# Patient Record
Sex: FEMALE | Race: WHITE | Employment: UNEMPLOYED | URBAN - METROPOLITAN AREA
[De-identification: names, ages, dates, MRNs, and addresses within clinical notes are randomized per-mention and may not be internally consistent; named-entity substitution may affect disease eponyms.]

---

## 2018-05-29 RX ORDER — CIPROFLOXACIN 250 MG/1
250 TABLET, FILM COATED ORAL 2 TIMES DAILY
Qty: 14 TABLET | Refills: 0 | Status: SHIPPED | OUTPATIENT
Start: 2018-05-29 | End: 2018-06-05

## 2018-10-17 ENCOUNTER — APPOINTMENT (OUTPATIENT)
Dept: GENERAL RADIOLOGY | Age: 83
DRG: 563 | End: 2018-10-17
Payer: COMMERCIAL

## 2018-10-17 ENCOUNTER — HOSPITAL ENCOUNTER (INPATIENT)
Age: 83
LOS: 5 days | Discharge: NURSING, REHAB OR LONG-TERM CARE | DRG: 563 | End: 2018-10-22
Attending: EMERGENCY MEDICINE | Admitting: INTERNAL MEDICINE
Payer: COMMERCIAL

## 2018-10-17 DIAGNOSIS — N39.0 URINARY TRACT INFECTION WITHOUT HEMATURIA, SITE UNSPECIFIED: ICD-10-CM

## 2018-10-17 DIAGNOSIS — S82.892A CLOSED FRACTURE OF LEFT ANKLE, INITIAL ENCOUNTER: Primary | ICD-10-CM

## 2018-10-17 LAB
ANION GAP SERPL CALCULATED.3IONS-SCNC: 13 MMOL/L (ref 3–16)
APTT: 32.9 SEC (ref 26–36)
BACTERIA: ABNORMAL /HPF
BILIRUBIN URINE: NEGATIVE
BLOOD, URINE: ABNORMAL
BUN BLDV-MCNC: 25 MG/DL (ref 7–20)
CALCIUM SERPL-MCNC: 9.1 MG/DL (ref 8.3–10.6)
CHLORIDE BLD-SCNC: 105 MMOL/L (ref 99–110)
CLARITY: ABNORMAL
CO2: 22 MMOL/L (ref 21–32)
COLOR: YELLOW
CREAT SERPL-MCNC: 0.6 MG/DL (ref 0.6–1.2)
EPITHELIAL CELLS, UA: 2 /HPF (ref 0–5)
GFR AFRICAN AMERICAN: >60
GFR NON-AFRICAN AMERICAN: >60
GLUCOSE BLD-MCNC: 122 MG/DL (ref 70–99)
GLUCOSE URINE: NEGATIVE MG/DL
HCT VFR BLD CALC: 39.7 % (ref 36–48)
HEMOGLOBIN: 12.7 G/DL (ref 12–16)
HYALINE CASTS: 5 /LPF (ref 0–8)
INR BLD: 1.18 (ref 0.86–1.14)
KETONES, URINE: NEGATIVE MG/DL
LEUKOCYTE ESTERASE, URINE: ABNORMAL
MCH RBC QN AUTO: 28 PG (ref 26–34)
MCHC RBC AUTO-ENTMCNC: 32 G/DL (ref 31–36)
MCV RBC AUTO: 87.4 FL (ref 80–100)
MICROSCOPIC EXAMINATION: YES
NITRITE, URINE: NEGATIVE
PDW BLD-RTO: 15.4 % (ref 12.4–15.4)
PH UA: 7
PLATELET # BLD: 185 K/UL (ref 135–450)
PMV BLD AUTO: 8 FL (ref 5–10.5)
POTASSIUM SERPL-SCNC: 3.6 MMOL/L (ref 3.5–5.1)
PROTEIN UA: NEGATIVE MG/DL
PROTHROMBIN TIME: 13.4 SEC (ref 9.8–13)
RBC # BLD: 4.55 M/UL (ref 4–5.2)
RBC UA: 3 /HPF (ref 0–4)
SODIUM BLD-SCNC: 140 MMOL/L (ref 136–145)
SPECIFIC GRAVITY UA: 1.01
URINE REFLEX TO CULTURE: YES
URINE TYPE: ABNORMAL
UROBILINOGEN, URINE: 0.2 E.U./DL
WBC # BLD: 7 K/UL (ref 4–11)
WBC UA: 70 /HPF (ref 0–5)

## 2018-10-17 PROCEDURE — 73552 X-RAY EXAM OF FEMUR 2/>: CPT

## 2018-10-17 PROCEDURE — 6360000002 HC RX W HCPCS: Performed by: INTERNAL MEDICINE

## 2018-10-17 PROCEDURE — 87186 SC STD MICRODIL/AGAR DIL: CPT

## 2018-10-17 PROCEDURE — 80048 BASIC METABOLIC PNL TOTAL CA: CPT

## 2018-10-17 PROCEDURE — 85027 COMPLETE CBC AUTOMATED: CPT

## 2018-10-17 PROCEDURE — 81001 URINALYSIS AUTO W/SCOPE: CPT

## 2018-10-17 PROCEDURE — 94761 N-INVAS EAR/PLS OXIMETRY MLT: CPT

## 2018-10-17 PROCEDURE — 6370000000 HC RX 637 (ALT 250 FOR IP): Performed by: INTERNAL MEDICINE

## 2018-10-17 PROCEDURE — 87086 URINE CULTURE/COLONY COUNT: CPT

## 2018-10-17 PROCEDURE — 2580000003 HC RX 258: Performed by: INTERNAL MEDICINE

## 2018-10-17 PROCEDURE — 87040 BLOOD CULTURE FOR BACTERIA: CPT

## 2018-10-17 PROCEDURE — 96365 THER/PROPH/DIAG IV INF INIT: CPT

## 2018-10-17 PROCEDURE — 6360000002 HC RX W HCPCS: Performed by: EMERGENCY MEDICINE

## 2018-10-17 PROCEDURE — 27808 TREATMENT OF ANKLE FRACTURE: CPT | Performed by: ORTHOPAEDIC SURGERY

## 2018-10-17 PROCEDURE — 99222 1ST HOSP IP/OBS MODERATE 55: CPT | Performed by: ORTHOPAEDIC SURGERY

## 2018-10-17 PROCEDURE — 1200000000 HC SEMI PRIVATE

## 2018-10-17 PROCEDURE — 87077 CULTURE AEROBIC IDENTIFY: CPT

## 2018-10-17 PROCEDURE — 4500000025 HC ED LEVEL 5 PROCEDURE

## 2018-10-17 PROCEDURE — 2580000003 HC RX 258: Performed by: NURSE PRACTITIONER

## 2018-10-17 PROCEDURE — 71045 X-RAY EXAM CHEST 1 VIEW: CPT

## 2018-10-17 PROCEDURE — 2700000000 HC OXYGEN THERAPY PER DAY

## 2018-10-17 PROCEDURE — 96372 THER/PROPH/DIAG INJ SC/IM: CPT

## 2018-10-17 PROCEDURE — 99285 EMERGENCY DEPT VISIT HI MDM: CPT

## 2018-10-17 PROCEDURE — 2580000003 HC RX 258: Performed by: EMERGENCY MEDICINE

## 2018-10-17 PROCEDURE — 73521 X-RAY EXAM HIPS BI 2 VIEWS: CPT

## 2018-10-17 PROCEDURE — 85610 PROTHROMBIN TIME: CPT

## 2018-10-17 PROCEDURE — 85730 THROMBOPLASTIN TIME PARTIAL: CPT

## 2018-10-17 PROCEDURE — 73590 X-RAY EXAM OF LOWER LEG: CPT

## 2018-10-17 PROCEDURE — 73600 X-RAY EXAM OF ANKLE: CPT

## 2018-10-17 RX ORDER — DORZOLAMIDE HYDROCHLORIDE AND TIMOLOL MALEATE 20; 5 MG/ML; MG/ML
1 SOLUTION/ DROPS OPHTHALMIC 2 TIMES DAILY
Status: DISCONTINUED | OUTPATIENT
Start: 2018-10-17 | End: 2018-10-22 | Stop reason: HOSPADM

## 2018-10-17 RX ORDER — SODIUM CHLORIDE 0.9 % (FLUSH) 0.9 %
10 SYRINGE (ML) INJECTION EVERY 12 HOURS SCHEDULED
Status: DISCONTINUED | OUTPATIENT
Start: 2018-10-17 | End: 2018-10-22 | Stop reason: HOSPADM

## 2018-10-17 RX ORDER — SOLIFENACIN SUCCINATE 10 MG/1
5 TABLET, FILM COATED ORAL DAILY
Status: DISCONTINUED | OUTPATIENT
Start: 2018-10-17 | End: 2018-10-17 | Stop reason: CLARIF

## 2018-10-17 RX ORDER — SOLIFENACIN SUCCINATE 5 MG/1
5 TABLET, FILM COATED ORAL DAILY
Status: ON HOLD | COMMUNITY
End: 2020-08-14 | Stop reason: HOSPADM

## 2018-10-17 RX ORDER — HYDRALAZINE HYDROCHLORIDE 10 MG/1
10 TABLET, FILM COATED ORAL 2 TIMES DAILY PRN
Status: ON HOLD | COMMUNITY
End: 2020-08-14 | Stop reason: HOSPADM

## 2018-10-17 RX ORDER — PROMETHAZINE HYDROCHLORIDE 25 MG/1
25 SUPPOSITORY RECTAL EVERY 6 HOURS PRN
Status: ON HOLD | COMMUNITY
End: 2018-10-19 | Stop reason: HOSPADM

## 2018-10-17 RX ORDER — LATANOPROST 50 UG/ML
1 SOLUTION/ DROPS OPHTHALMIC NIGHTLY
Status: ON HOLD | COMMUNITY
End: 2020-08-14 | Stop reason: HOSPADM

## 2018-10-17 RX ORDER — CLOTRIMAZOLE 1 %
CREAM (GRAM) TOPICAL 2 TIMES DAILY
Status: DISCONTINUED | OUTPATIENT
Start: 2018-10-17 | End: 2018-10-22 | Stop reason: HOSPADM

## 2018-10-17 RX ORDER — LISINOPRIL 5 MG/1
2.5 TABLET ORAL DAILY
Status: DISCONTINUED | OUTPATIENT
Start: 2018-10-17 | End: 2018-10-22 | Stop reason: HOSPADM

## 2018-10-17 RX ORDER — LEVOTHYROXINE SODIUM 0.12 MG/1
125 TABLET ORAL DAILY
Status: DISCONTINUED | OUTPATIENT
Start: 2018-10-17 | End: 2018-10-22 | Stop reason: HOSPADM

## 2018-10-17 RX ORDER — POTASSIUM CHLORIDE 1500 MG/1
20 TABLET, FILM COATED, EXTENDED RELEASE ORAL DAILY
Status: ON HOLD | COMMUNITY
End: 2018-10-19 | Stop reason: HOSPADM

## 2018-10-17 RX ORDER — POLYETHYLENE GLYCOL 3350 17 G/17G
17 POWDER, FOR SOLUTION ORAL DAILY PRN
Status: DISCONTINUED | OUTPATIENT
Start: 2018-10-17 | End: 2018-10-22

## 2018-10-17 RX ORDER — ONDANSETRON 4 MG/1
4 TABLET, FILM COATED ORAL EVERY 8 HOURS PRN
Status: ON HOLD | COMMUNITY
End: 2018-10-19 | Stop reason: HOSPADM

## 2018-10-17 RX ORDER — ONDANSETRON 2 MG/ML
4 INJECTION INTRAMUSCULAR; INTRAVENOUS EVERY 6 HOURS PRN
Status: DISCONTINUED | OUTPATIENT
Start: 2018-10-17 | End: 2018-10-22 | Stop reason: HOSPADM

## 2018-10-17 RX ORDER — LOPERAMIDE HYDROCHLORIDE 2 MG/1
2 CAPSULE ORAL 4 TIMES DAILY PRN
Status: ON HOLD | COMMUNITY
End: 2018-10-19 | Stop reason: HOSPADM

## 2018-10-17 RX ORDER — ACETAMINOPHEN 325 MG/1
650 TABLET ORAL EVERY 8 HOURS PRN
Status: DISCONTINUED | OUTPATIENT
Start: 2018-10-17 | End: 2018-10-22 | Stop reason: HOSPADM

## 2018-10-17 RX ORDER — CALCIUM CARBONATE 500(1250)
500 TABLET ORAL
Status: DISCONTINUED | OUTPATIENT
Start: 2018-10-17 | End: 2018-10-22 | Stop reason: HOSPADM

## 2018-10-17 RX ORDER — TROSPIUM CHLORIDE 20 MG/1
20 TABLET, FILM COATED ORAL NIGHTLY
Status: DISCONTINUED | OUTPATIENT
Start: 2018-10-17 | End: 2018-10-22 | Stop reason: HOSPADM

## 2018-10-17 RX ORDER — ASPIRIN 325 MG
325 TABLET, DELAYED RELEASE (ENTERIC COATED) ORAL DAILY
Qty: 30 TABLET | Refills: 0 | Status: ON HOLD | OUTPATIENT
Start: 2018-10-17 | End: 2020-08-14 | Stop reason: HOSPADM

## 2018-10-17 RX ORDER — SODIUM CHLORIDE 0.9 % (FLUSH) 0.9 %
10 SYRINGE (ML) INJECTION PRN
Status: DISCONTINUED | OUTPATIENT
Start: 2018-10-17 | End: 2018-10-22 | Stop reason: HOSPADM

## 2018-10-17 RX ORDER — TRIAMCINOLONE ACETONIDE 1 MG/G
CREAM TOPICAL 2 TIMES DAILY
Status: DISCONTINUED | OUTPATIENT
Start: 2018-10-17 | End: 2018-10-22 | Stop reason: HOSPADM

## 2018-10-17 RX ORDER — AMOXICILLIN 250 MG
1 CAPSULE ORAL 2 TIMES DAILY PRN
Status: ON HOLD | COMMUNITY
End: 2020-08-14 | Stop reason: HOSPADM

## 2018-10-17 RX ORDER — LATANOPROST 50 UG/ML
1 SOLUTION/ DROPS OPHTHALMIC NIGHTLY
Status: DISCONTINUED | OUTPATIENT
Start: 2018-10-17 | End: 2018-10-22 | Stop reason: HOSPADM

## 2018-10-17 RX ORDER — PANTOPRAZOLE SODIUM 40 MG/1
40 TABLET, DELAYED RELEASE ORAL
Status: DISCONTINUED | OUTPATIENT
Start: 2018-10-17 | End: 2018-10-22 | Stop reason: HOSPADM

## 2018-10-17 RX ORDER — LORATADINE 10 MG/1
10 TABLET ORAL DAILY
Status: ON HOLD | COMMUNITY
End: 2020-08-14 | Stop reason: HOSPADM

## 2018-10-17 RX ORDER — LISINOPRIL 2.5 MG/1
2.5 TABLET ORAL DAILY
Status: ON HOLD | COMMUNITY
End: 2020-08-14 | Stop reason: HOSPADM

## 2018-10-17 RX ORDER — POLYETHYLENE GLYCOL 3350 17 G/17G
17 POWDER, FOR SOLUTION ORAL 2 TIMES DAILY PRN
Status: ON HOLD | COMMUNITY
End: 2020-08-14 | Stop reason: HOSPADM

## 2018-10-17 RX ORDER — CALCIUM CARBONATE 500(1250)
500 TABLET ORAL 2 TIMES DAILY
Status: ON HOLD | COMMUNITY
End: 2020-08-14 | Stop reason: HOSPADM

## 2018-10-17 RX ORDER — SODIUM CHLORIDE 9 MG/ML
INJECTION, SOLUTION INTRAVENOUS CONTINUOUS
Status: DISCONTINUED | OUTPATIENT
Start: 2018-10-17 | End: 2018-10-22

## 2018-10-17 RX ADMIN — CLOTRIMAZOLE: 10 CREAM TOPICAL at 21:15

## 2018-10-17 RX ADMIN — CALCIUM 500 MG: 500 TABLET ORAL at 10:41

## 2018-10-17 RX ADMIN — TROSPIUM CHLORIDE 20 MG: 20 TABLET ORAL at 22:48

## 2018-10-17 RX ADMIN — DORZOLAMIDE HYDROCHLORIDE AND TIMOLOL MALEATE 1 DROP: 20; 5 SOLUTION/ DROPS OPHTHALMIC at 22:49

## 2018-10-17 RX ADMIN — TRIAMCINOLONE ACETONIDE: 1 CREAM TOPICAL at 21:15

## 2018-10-17 RX ADMIN — PANTOPRAZOLE SODIUM 40 MG: 40 TABLET, DELAYED RELEASE ORAL at 10:41

## 2018-10-17 RX ADMIN — Medication 10 ML: at 21:13

## 2018-10-17 RX ADMIN — ACETAMINOPHEN 650 MG: 325 TABLET, FILM COATED ORAL at 15:15

## 2018-10-17 RX ADMIN — LATANOPROST 1 DROP: 50 SOLUTION OPHTHALMIC at 22:48

## 2018-10-17 RX ADMIN — ENOXAPARIN SODIUM 40 MG: 40 INJECTION SUBCUTANEOUS at 10:41

## 2018-10-17 RX ADMIN — SODIUM CHLORIDE: 9 INJECTION, SOLUTION INTRAVENOUS at 21:11

## 2018-10-17 RX ADMIN — Medication 10 ML: at 10:41

## 2018-10-17 RX ADMIN — METOPROLOL TARTRATE 25 MG: 25 TABLET ORAL at 10:39

## 2018-10-17 RX ADMIN — LEVOTHYROXINE SODIUM 125 MCG: 125 TABLET ORAL at 10:41

## 2018-10-17 RX ADMIN — VITAMIN D, TAB 1000IU (100/BT) 2000 UNITS: 25 TAB at 10:39

## 2018-10-17 RX ADMIN — LISINOPRIL 2.5 MG: 5 TABLET ORAL at 10:41

## 2018-10-17 RX ADMIN — CEFTRIAXONE 1 G: 1 INJECTION, POWDER, FOR SOLUTION INTRAMUSCULAR; INTRAVENOUS at 06:45

## 2018-10-17 NOTE — CONSULTS
[facility] Orthopedic Surgery  Consult Note        This patient is seen in consultation at the request of Dr Mike Schuler MD    Reason for Consult:  Left ankle pain / medial & lateral bimalleolar fracture with proximal fibula fracture. CHIEF COMPLAINT:  Left ankle pain / fall. History Obtained From:  patient, family member - son, electronic medical record    HISTORY OF PRESENT ILLNESS:    Ms. Randy Pichardo 80 y.o.  female presents today to [facility] ED for evaluation of a left ankle injury which occurred when she was walking and tripped. She was first seen and evaluated in ED, where she was x-rayed, and I was consulted. She is complaining of medial & lateral ankle pain and swelling. This is better with elevation and worse with bearing any wt. The pain is sharp and not radiating. No numbness or tingling sensation. Alleviating factors: elevation and rest. She also has UTI and will be admitted for that. No other complaint.      Past Medical History:        Diagnosis Date    Carcinoma in situ of colon 1998    Diarrhea     Esophageal reflux     Other extrapyramidal disease and abnormal movement disorder     Pernicious anemia     S/P colonoscopy 4/09    OK - no further eval.    Unspecified cataract     Unspecified hypothyroidism     Unspecified pruritic disorder        Past Surgical History:        Procedure Laterality Date    COLECTOMY      partial    COLONOSCOPY  4/09    OK - no further eval    HYSTERECTOMY         Current Medications:   Current Facility-Administered Medications: calcium elemental (OSCAL) tablet 500 mg, 500 mg, Oral, Daily with breakfast  dorzolamide-timolol (COSOPT) 22.3-6.8 MG/ML ophthalmic solution 1 drop, 1 drop, Both Eyes, BID  latanoprost (XALATAN) 0.005 % ophthalmic solution 1 drop, 1 drop, Both Eyes, Nightly  levothyroxine (SYNTHROID) tablet 125 mcg, 125 mcg, Oral, Daily  lisinopril (PRINIVIL;ZESTRIL) tablet 2.5 mg, 2.5 mg, Oral, Daily  metoprolol tartrate (LOPRESSOR) tablet
Daily  metoprolol tartrate (LOPRESSOR) tablet 25 mg, 25 mg, Oral, BID  vitamin D (CHOLECALCIFEROL) tablet 2,000 Units, 2,000 Units, Oral, Daily  [START ON 10/18/2018] cefTRIAXone (ROCEPHIN) 1 g IVPB in 50 mL D5W minibag, 1 g, Intravenous, Q24H  sodium chloride flush 0.9 % injection 10 mL, 10 mL, Intravenous, 2 times per day  sodium chloride flush 0.9 % injection 10 mL, 10 mL, Intravenous, PRN  magnesium hydroxide (MILK OF MAGNESIA) 400 MG/5ML suspension 30 mL, 30 mL, Oral, Daily PRN  ondansetron (ZOFRAN) injection 4 mg, 4 mg, Intravenous, Q6H PRN  enoxaparin (LOVENOX) injection 40 mg, 40 mg, Subcutaneous, Daily  pantoprazole (PROTONIX) tablet 40 mg, 40 mg, Oral, QAM AC  trospium (SANCTURA) tablet 20 mg, 20 mg, Oral, Nightly  polyethylene glycol (GLYCOLAX) packet 17 g, 17 g, Oral, Daily PRN  clotrimazole (LOTRIMIN) 1 % cream, , Topical, BID  triamcinolone (KENALOG) 0.1 % cream, , Topical, BID  acetaminophen (TYLENOL) tablet 650 mg, 650 mg, Oral, Q8H PRN  Allergies:  Bee venom    REVIEW OF SYSTEMS:    CONSTITUTIONAL:  negative for  fevers, chills and malaise  MUSCULOSKELETAL:  positive for  myalgias, arthralgias and pain  All other ROS reviewed in chart or with patient or family and are grossly negative. PHYSICAL EXAM:    VITALS:  BP (!) 153/88   Pulse 101   Temp 97.7 °F (36.5 °C) (Oral)   Resp 16   Ht 5' 2\" (1.575 m)   Wt 179 lb 10.8 oz (81.5 kg)   SpO2 94%   BMI 32.86 kg/m²     MUSCULOSKELETAL:  left foot NVI. Wiggles toes to command. DPpulse is palpable. Left toes warm and pink. RIght heel with no erythema or warmth. There is mild tenderness, no skin interruptions right heel. Nontender right ankle or bilateral hip or knees.    NEUROLOGIC:   Sensory:    Touch:                                     Right Lower Extremity:  normal                  Left Lower Extremity:  normal  Skin warm and dry  Resp deep and easy  Abdomen soft and round  Pulse is with regular rate and rhythm    DATA:    CBC:   Lab Results

## 2018-10-17 NOTE — H&P
daily   Yes Historical Provider, MD   metoprolol tartrate (LOPRESSOR) 25 MG tablet Take 25 mg by mouth 2 times daily   Yes Historical Provider, MD   calcium elemental (OYSCO 500) 500 MG TABS tablet Take 500 mg by mouth daily   Yes Historical Provider, MD   potassium chloride (KLOR-CON) 10 MEQ extended release tablet Take 20 mEq by mouth daily   Yes Historical Provider, MD   solifenacin (VESICARE) 5 MG tablet Take 5 mg by mouth daily   Yes Historical Provider, MD   vitamin D (CHOLECALCIFEROL) 1000 UNIT TABS tablet Take 2,000 Units by mouth daily   Yes Historical Provider, MD   hydrALAZINE (APRESOLINE) 10 MG tablet Take 10 mg by mouth 2 times daily as needed   Yes Historical Provider, MD   loperamide (IMODIUM) 2 MG capsule Take 2 mg by mouth 4 times daily as needed for Diarrhea   Yes Historical Provider, MD   polyethylene glycol (GLYCOLAX) powder Take 17 g by mouth 2 times daily as needed   Yes Historical Provider, MD   senna-docusate (PERICOLACE) 8.6-50 MG per tablet Take 1 tablet by mouth 2 times daily as needed for Constipation   Yes Historical Provider, MD   loratadine (CLARITIN) 10 MG tablet Take 10 mg by mouth daily   Yes Historical Provider, MD   dorzolamide-timolol (COSOPT) 22.3-6.8 MG/ML ophthalmic solution Place 1 drop into both eyes every 12 hours   Yes Historical Provider, MD   Flax OIL Take by mouth daily   Yes Historical Provider, MD   acetaminophen (AMINOFEN) 325 MG tablet Take 2 tablets by mouth every 6 hours as needed for Pain  Patient taking differently: Take 650 mg by mouth 2 times daily  4/18/16  Yes CELIA Matson CNP   ranitidine (ZANTAC) 150 MG tablet TAKE ONE TABLET BY MOUTH TWICE A DAY 1/26/16  Yes Namrata Mcfarlane MD   levothyroxine (SYNTHROID) 125 MCG tablet TAKE ONE TABLET BY MOUTH DAILY 1/26/16  Yes Namrata Mcfarlane MD   cyanocobalamin 1000 MCG/ML injection INJECT 1 ML ONCE EVERY MONTH 12/7/15  Yes Namrata Mcfarlane MD   Compression Stockings MISC by Does not apply route 20-30 mmHg -- last 72 hours. Urinalysis:      Lab Results   Component Value Date    NITRU Negative 10/17/2018    WBCUA 70 10/17/2018    BACTERIA 4+ 10/17/2018    RBCUA 3 10/17/2018    BLOODU TRACE 10/17/2018    SPECGRAV 1.011 10/17/2018    GLUCOSEU Negative 10/17/2018    GLUCOSEU NEGATIVE 05/17/2010       Radiology:       XR ANKLE LEFT (2 VIEWS)   Preliminary Result   Acute nondisplaced medial malleolus fracture. Minimally displaced distal fibular fracture. Severe diffuse osteopenia. XR FEMUR LEFT (MIN 2 VIEWS)   Preliminary Result   Possible nondisplaced proximal fibular fracture. No additional acute osseous abnormalities are identified. If patient is persistently symptomatic, consider further evaluation with MR,   given degree of osteopenia. XR HIP BILATERAL W AP PELVIS (2 VIEWS)   Preliminary Result   Possible nondisplaced proximal fibular fracture. No additional acute osseous abnormalities are identified. If patient is persistently symptomatic, consider further evaluation with MR,   given degree of osteopenia. XR TIBIA FIBULA LEFT (2 VIEWS)   Preliminary Result   Possible nondisplaced proximal fibular fracture. No additional acute osseous abnormalities are identified. If patient is persistently symptomatic, consider further evaluation with MR,   given degree of osteopenia. XR CHEST 1 VW   Preliminary Result   New left lower lobe pulmonary opacity may represent atelectasis, pneumonia,   pleural effusion, and/or pulmonary contusion. Recommend radiographic   follow-up to resolution. No evidence of pneumothorax. No evidence of displaced fractures. Diffuse osteopenia.              ASSESSMENT:    Active Hospital Problems    Diagnosis Date Noted    Bimalleolar ankle fracture, left, closed, initial encounter [S82.002A]     UTI (urinary tract infection) [N39.0] 02/08/2016    Hypothyroidism [E03.9]     Esophageal reflux [K21.9]

## 2018-10-17 NOTE — PROGRESS NOTES
Pt admitted to room 3107, admission complete, pt has no needs at this time.  Will continue to monitor and assess, daughter at bedside, helping patient order breakfast. Electronically signed by Quincy Hinton RN on 10/17/2018 at 10:31 AM

## 2018-10-17 NOTE — PROGRESS NOTES
Pharmacy Medication Reconciliation Note     List of medications patient is currently taking is complete. Source of information:   1. Nursing Home MAR  2. EMR    Allergies   Allergen Reactions    Bee Venom        Notes regarding home medications:   1. Medication list updated using patient's nursing home [facility] from [facility].       Reyes [place], PharmD, BCPS  10/17/2018  2:40 PM

## 2018-10-17 NOTE — ED PROVIDER NOTES
solution 1 drop  1 drop Both Eyes Nightly Saroj Worrell MD   1 drop at 10/17/18 2248    levothyroxine (SYNTHROID) tablet 125 mcg  125 mcg Oral Daily Saroj Worrell MD   125 mcg at 10/18/18 1022    lisinopril (PRINIVIL;ZESTRIL) tablet 2.5 mg  2.5 mg Oral Daily Saroj Worrell MD   2.5 mg at 10/17/18 1041    metoprolol tartrate (LOPRESSOR) tablet 25 mg  25 mg Oral BID Saroj Worrell MD   25 mg at 10/18/18 1022    vitamin D (CHOLECALCIFEROL) tablet 2,000 Units  2,000 Units Oral Daily Saroj Worrell MD   2,000 Units at 10/18/18 1022    cefTRIAXone (ROCEPHIN) 1 g IVPB in 50 mL D5W minibag  1 g Intravenous Q24H Saroj Temple MD   Stopped at 10/18/18 0610    sodium chloride flush 0.9 % injection 10 mL  10 mL Intravenous 2 times per day Saroj Temple MD   10 mL at 10/18/18 1023    sodium chloride flush 0.9 % injection 10 mL  10 mL Intravenous PRN Saroj Worrell MD        magnesium hydroxide (MILK OF MAGNESIA) 400 MG/5ML suspension 30 mL  30 mL Oral Daily PRN Saroj Worrell MD        ondansetron (ZOFRAN) injection 4 mg  4 mg Intravenous Q6H PRN Saroj Worrell MD        enoxaparin (LOVENOX) injection 40 mg  40 mg Subcutaneous Daily Saroj Worrell MD   40 mg at 10/18/18 1022    pantoprazole (PROTONIX) tablet 40 mg  40 mg Oral QAM AC Saroj Worrell MD   40 mg at 10/18/18 0540    trospium (SANCTURA) tablet 20 mg  20 mg Oral Nightly Saroj Worrell MD   20 mg at 10/17/18 2248    polyethylene glycol (GLYCOLAX) packet 17 g  17 g Oral Daily PRN Saroj Worrell MD        clotrimazole (LOTRIMIN) 1 % cream   Topical BID Saroj Worrell MD        triamcinolone (KENALOG) 0.1 % cream   Topical BID Saroj Worrell MD        acetaminophen (TYLENOL) tablet 650 mg  650 mg Oral Q8H PRN Saroj Worrell MD   650 mg at 10/18/18 1434    0.9 % sodium chloride infusion   Intravenous Continuous CELIA Ortiz - CNP 75 mL/hr at 10/17/18 2111       Allergies Allergen Reactions    Bee Venom        [unfilled]    Nursing Notes Reviewed    Physical Exam:  Vitals:    10/18/18 0822   BP: 111/74   Pulse: 81   Resp: 16   Temp: 98.1 °F (36.7 °C)   SpO2: 97%       Physical Exam   Constitutional: Patient is oriented to person, place. appears well-developed and well-nourished. [place] Non-toxic appearance. No distress. HENT:   Head: Normocephalic and atraumatic. Eyes: Conjunctivae and EOM are normal. Pupils are equal, round, and reactive to light. No scleral icterus. Neck: Full passive range of motion without pain. Neck supple. No spinous process tenderness and no muscular tenderness present. Cardiovascular: Normal rate and regular rhythm. Exam reveals no gallop and no friction rub. All extremities NVI  No murmur heard. Pulmonary/Chest: Effort normal and breath sounds normal. No respiratory distress. No crepitus to chest   Abdominal: Soft. Normal appearance. No distension and no mass. There is no tenderness. There is no rigidity. Musculoskeletal:        Right/Left shoulder: Normal.        Right/Left elbow: Normal.       Right/Left wrist: Normal.        Right/Left hip:Normal       Right/Left knee: Normal.        Right ankle: Normal.        Right foot: Normal.   Left foot NVI. Wiggles toes to command. DPpulse is palpable. Left toes warm and pink. RIght heel with no erythema or warmth. There is mild tenderness, no skin interruptions right heel. Nontender right ankle or bilateral hip or knees. Neurological: Alert and oriented   Skin: Skin is warm and dry. No rash noted. No traumatic injuries noted  Psychiatric: Normal mood and affect.        I have reviewed and interpreted all of the currently available lab results from this visit (if applicable):  Results for orders placed or performed during the hospital encounter of 10/17/18   Urine Culture   Result Value Ref Range    Urine Culture, Routine      Organism Gram negative nicky (A)     Urine Culture, Routine >100,000 CFU/ml  ID and sensitivity to follow      Culture Blood #1   Result Value Ref Range    Blood Culture, Routine       No Growth to date. Any change in status will be called. Culture Blood #2   Result Value Ref Range    Culture, Blood 2       No Growth to date. Any change in status will be called.    Urine, reflex to culture   Result Value Ref Range    Color, UA YELLOW Straw/Yellow    Clarity, UA CLOUDY (A) Clear    Glucose, Ur Negative Negative mg/dL    Bilirubin Urine Negative Negative    Ketones, Urine Negative Negative mg/dL    Specific Gravity, UA 1.011 1.005 - 1.030    Blood, Urine TRACE (A) Negative    pH, UA 7.0 5.0 - 8.0    Protein, UA Negative Negative mg/dL    Urobilinogen, Urine 0.2 <2.0 E.U./dL    Nitrite, Urine Negative Negative    Leukocyte Esterase, Urine LARGE (A) Negative    Microscopic Examination YES     Urine Reflex to Culture Yes     Urine Type Not Specified    Microscopic Urinalysis   Result Value Ref Range    Bacteria, UA 4+ (A) /HPF    Hyaline Casts, UA 5 0 - 8 /LPF    WBC, UA 70 (H) 0 - 5 /HPF    RBC, UA 3 0 - 4 /HPF    Epi Cells 2 0 - 5 /HPF   CBC   Result Value Ref Range    WBC 7.0 4.0 - 11.0 K/uL    RBC 4.55 4.00 - 5.20 M/uL    Hemoglobin 12.7 12.0 - 16.0 g/dL    Hematocrit 39.7 36.0 - 48.0 %    MCV 87.4 80.0 - 100.0 fL    MCH 28.0 26.0 - 34.0 pg    MCHC 32.0 31.0 - 36.0 g/dL    RDW 15.4 12.4 - 15.4 %    Platelets 522 861 - 119 K/uL    MPV 8.0 5.0 - 10.5 fL   Basic Metabolic Panel   Result Value Ref Range    Sodium 140 136 - 145 mmol/L    Potassium 3.6 3.5 - 5.1 mmol/L    Chloride 105 99 - 110 mmol/L    CO2 22 21 - 32 mmol/L    Anion Gap 13 3 - 16    Glucose 122 (H) 70 - 99 mg/dL    BUN 25 (H) 7 - 20 mg/dL    CREATININE 0.6 0.6 - 1.2 mg/dL    GFR Non-African American >60 >60    GFR African American >60 >60    Calcium 9.1 8.3 - 10.6 mg/dL   Protime-INR   Result Value Ref Range    Protime 13.4 (H) 9.8 - 13.0 sec    INR 1.18 (H) 0.86 - 1.14   APTT   Result Value Ref Range    aPTT 32.9 26.0 recent. Fabricio Devine is a   minimally displaced distal fibular fracture.       Severe diffuse osteopenia somewhat limits evaluation for nondisplaced   fractures.       Degenerative changes of the left ankle.  Soft tissue swelling is present   adjacent to the left ankle.           Impression   Acute nondisplaced medial malleolus fracture.       Minimally displaced distal fibular fracture.                EKG (if obtained): (All EKG's are interpreted by myself in the absence of a cardiologist)  Initial EKG on my interpretation shows  n/a. MDM:  Differential diagnosis: Ankle fracture, UTI, fibular fracture    80year-old female presents with son, Marni Montoya, for evaluation of fall and left lower extremity pain and additionally urinary frequency. Workup shows that this 51-year-old female has 3 fracture locations in the left lower extremity, both the proximal and distal fibula and the lateral malleolus. Additionally she has a UTI and is constantly attempting to get up and go the bathroom. In light of the UTI I will start IV antibiotics and admit the patient to medicine. Orthopedics, Dr. Audrey Alejandro, has been consulted and recommends a short leg sugar tong/posterior splint and will see the patient on the inpatient side    Old records reviewed. Labs and imaging reviewed and results discussed with patient. Patient was given scripts for the following medications. I counseled patient how to take these medications. Current Discharge Medication List      START taking these medications    Details   aspirin 325 MG EC tablet Take 1 tablet by mouth daily  Qty: 30 tablet, Refills: 0               CRITICAL CARE TIME   Total Critical Care time was 30 minutes, excluding separately reportable procedures. There was a high probability of clinically significant/life threatening deterioration in the patient's condition which required my urgent intervention.       Clinical Impression:  Multiple left lower extremity fractures, UTI  (Please note

## 2018-10-17 NOTE — CARE COORDINATION
CASE MANAGEMENT: MET WITH KRISTIN GUILLEN, AT BEDSIDE , WHILE PT SLEPT  ([phone]). KRISTIN IS AN RN    Presently, pt resides at [address] [place] [address] at [facility]. She uses a walker to go down for her meals and has a wheelchair for outside use. Karishma Springerky states [facility] assists her with meds and standby assist for showering. Karishma Parada thinks ortho is taking non op approach and will be NWB. Per family request, a referral was called and faxed to [facility]. Await callback with acceptance. Will need insurance precert. Contact information for case management provided to pt. Dalila Reis R.N.     [phone]

## 2018-10-17 NOTE — PLAN OF CARE
Problem: Falls - Risk of:  Goal: Will remain free from falls  Will remain free from falls   Outcome: Ongoing  Fall risk assessment completed. Fall precautions in place. Call light within reach. Pt educated on calling for assistance before getting up. Walkway free of clutter. Will continue to monitor. Electronically signed by Jamila Infante RN on 10/17/2018 at 11:04 AM      Goal: Absence of physical injury  Absence of physical injury   Outcome: Ongoing  Fall risk assessment completed. Fall precautions in place. Call light within reach. Pt educated on calling for assistance before getting up. Walkway free of clutter. Will continue to monitor. Electronically signed by Jamila Infante RN on 10/17/2018 at 11:04 AM    Problem: Risk for Impaired Skin Integrity  Goal: Tissue integrity - skin and mucous membranes  Structural intactness and normal physiological function of skin and  mucous membranes. Outcome: Ongoing  Pt assessed for skin break down. Skin was warm and dry to touch. Will assist patient with turning or repositioning at least every 2 hours to prevent skin breakdown. Will continue to monitor and assess.  Electronically signed by Jamila Infante RN on 10/17/2018 at 11:05 AM

## 2018-10-17 NOTE — PROGRESS NOTES
Patient requesting something for pain. Dr. Mansfield Layer aware and notified. See new order for prn tylenol. Will continue to monitor and reassess.  Electronically signed by Radha Ye RN on 10/17/2018

## 2018-10-18 LAB
A/G RATIO: 1.5 (ref 1.1–2.2)
ALBUMIN SERPL-MCNC: 3.4 G/DL (ref 3.4–5)
ALP BLD-CCNC: 42 U/L (ref 40–129)
ALT SERPL-CCNC: 7 U/L (ref 10–40)
ANION GAP SERPL CALCULATED.3IONS-SCNC: 11 MMOL/L (ref 3–16)
AST SERPL-CCNC: 15 U/L (ref 15–37)
BASOPHILS ABSOLUTE: 0 K/UL (ref 0–0.2)
BASOPHILS RELATIVE PERCENT: 0.7 %
BILIRUB SERPL-MCNC: 0.5 MG/DL (ref 0–1)
BUN BLDV-MCNC: 25 MG/DL (ref 7–20)
CALCIUM SERPL-MCNC: 8.3 MG/DL (ref 8.3–10.6)
CHLORIDE BLD-SCNC: 105 MMOL/L (ref 99–110)
CO2: 23 MMOL/L (ref 21–32)
CREAT SERPL-MCNC: 0.8 MG/DL (ref 0.6–1.2)
EOSINOPHILS ABSOLUTE: 0.2 K/UL (ref 0–0.6)
EOSINOPHILS RELATIVE PERCENT: 4 %
GFR AFRICAN AMERICAN: >60
GFR NON-AFRICAN AMERICAN: >60
GLOBULIN: 2.3 G/DL
GLUCOSE BLD-MCNC: 109 MG/DL (ref 70–99)
HCT VFR BLD CALC: 33.2 % (ref 36–48)
HEMOGLOBIN: 10.7 G/DL (ref 12–16)
LYMPHOCYTES ABSOLUTE: 0.8 K/UL (ref 1–5.1)
LYMPHOCYTES RELATIVE PERCENT: 16 %
MAGNESIUM: 2 MG/DL (ref 1.8–2.4)
MCH RBC QN AUTO: 28.1 PG (ref 26–34)
MCHC RBC AUTO-ENTMCNC: 32.3 G/DL (ref 31–36)
MCV RBC AUTO: 87 FL (ref 80–100)
MONOCYTES ABSOLUTE: 0.7 K/UL (ref 0–1.3)
MONOCYTES RELATIVE PERCENT: 14 %
NEUTROPHILS ABSOLUTE: 3.1 K/UL (ref 1.7–7.7)
NEUTROPHILS RELATIVE PERCENT: 65.3 %
PDW BLD-RTO: 15.6 % (ref 12.4–15.4)
PLATELET # BLD: 150 K/UL (ref 135–450)
PMV BLD AUTO: 7.8 FL (ref 5–10.5)
POTASSIUM REFLEX MAGNESIUM: 3.5 MMOL/L (ref 3.5–5.1)
RBC # BLD: 3.82 M/UL (ref 4–5.2)
SODIUM BLD-SCNC: 139 MMOL/L (ref 136–145)
TOTAL PROTEIN: 5.7 G/DL (ref 6.4–8.2)
WBC # BLD: 4.7 K/UL (ref 4–11)

## 2018-10-18 PROCEDURE — 97162 PT EVAL MOD COMPLEX 30 MIN: CPT

## 2018-10-18 PROCEDURE — 96372 THER/PROPH/DIAG INJ SC/IM: CPT

## 2018-10-18 PROCEDURE — 97530 THERAPEUTIC ACTIVITIES: CPT

## 2018-10-18 PROCEDURE — 2580000003 HC RX 258: Performed by: NURSE PRACTITIONER

## 2018-10-18 PROCEDURE — 94761 N-INVAS EAR/PLS OXIMETRY MLT: CPT

## 2018-10-18 PROCEDURE — 6360000002 HC RX W HCPCS: Performed by: INTERNAL MEDICINE

## 2018-10-18 PROCEDURE — G8979 MOBILITY GOAL STATUS: HCPCS

## 2018-10-18 PROCEDURE — 2580000003 HC RX 258: Performed by: INTERNAL MEDICINE

## 2018-10-18 PROCEDURE — 97166 OT EVAL MOD COMPLEX 45 MIN: CPT

## 2018-10-18 PROCEDURE — 83735 ASSAY OF MAGNESIUM: CPT

## 2018-10-18 PROCEDURE — 96366 THER/PROPH/DIAG IV INF ADDON: CPT

## 2018-10-18 PROCEDURE — G8988 SELF CARE GOAL STATUS: HCPCS

## 2018-10-18 PROCEDURE — 36415 COLL VENOUS BLD VENIPUNCTURE: CPT

## 2018-10-18 PROCEDURE — 2700000000 HC OXYGEN THERAPY PER DAY

## 2018-10-18 PROCEDURE — 85025 COMPLETE CBC W/AUTO DIFF WBC: CPT

## 2018-10-18 PROCEDURE — G8978 MOBILITY CURRENT STATUS: HCPCS

## 2018-10-18 PROCEDURE — 6370000000 HC RX 637 (ALT 250 FOR IP): Performed by: INTERNAL MEDICINE

## 2018-10-18 PROCEDURE — 97535 SELF CARE MNGMENT TRAINING: CPT

## 2018-10-18 PROCEDURE — 80053 COMPREHEN METABOLIC PANEL: CPT

## 2018-10-18 PROCEDURE — 1200000000 HC SEMI PRIVATE

## 2018-10-18 PROCEDURE — G8987 SELF CARE CURRENT STATUS: HCPCS

## 2018-10-18 RX ADMIN — VITAMIN D, TAB 1000IU (100/BT) 2000 UNITS: 25 TAB at 10:22

## 2018-10-18 RX ADMIN — CEFTRIAXONE 1 G: 1 INJECTION, POWDER, FOR SOLUTION INTRAMUSCULAR; INTRAVENOUS at 05:40

## 2018-10-18 RX ADMIN — CLOTRIMAZOLE: 10 CREAM TOPICAL at 10:22

## 2018-10-18 RX ADMIN — TROSPIUM CHLORIDE 20 MG: 20 TABLET ORAL at 22:14

## 2018-10-18 RX ADMIN — SODIUM CHLORIDE: 9 INJECTION, SOLUTION INTRAVENOUS at 23:46

## 2018-10-18 RX ADMIN — ACETAMINOPHEN 650 MG: 325 TABLET, FILM COATED ORAL at 01:48

## 2018-10-18 RX ADMIN — PANTOPRAZOLE SODIUM 40 MG: 40 TABLET, DELAYED RELEASE ORAL at 05:40

## 2018-10-18 RX ADMIN — METOPROLOL TARTRATE 25 MG: 25 TABLET ORAL at 22:14

## 2018-10-18 RX ADMIN — METOPROLOL TARTRATE 25 MG: 25 TABLET ORAL at 10:22

## 2018-10-18 RX ADMIN — ENOXAPARIN SODIUM 40 MG: 40 INJECTION SUBCUTANEOUS at 10:22

## 2018-10-18 RX ADMIN — TRIAMCINOLONE ACETONIDE: 1 CREAM TOPICAL at 10:22

## 2018-10-18 RX ADMIN — Medication 10 ML: at 10:23

## 2018-10-18 RX ADMIN — LATANOPROST 1 DROP: 50 SOLUTION OPHTHALMIC at 22:16

## 2018-10-18 RX ADMIN — TRIAMCINOLONE ACETONIDE: 1 CREAM TOPICAL at 22:15

## 2018-10-18 RX ADMIN — ACETAMINOPHEN 650 MG: 325 TABLET, FILM COATED ORAL at 14:34

## 2018-10-18 RX ADMIN — CALCIUM 500 MG: 500 TABLET ORAL at 10:22

## 2018-10-18 RX ADMIN — ACETAMINOPHEN 650 MG: 325 TABLET, FILM COATED ORAL at 23:45

## 2018-10-18 RX ADMIN — CLOTRIMAZOLE: 10 CREAM TOPICAL at 22:14

## 2018-10-18 RX ADMIN — LEVOTHYROXINE SODIUM 125 MCG: 125 TABLET ORAL at 10:22

## 2018-10-18 RX ADMIN — DORZOLAMIDE HYDROCHLORIDE AND TIMOLOL MALEATE 1 DROP: 20; 5 SOLUTION/ DROPS OPHTHALMIC at 22:14

## 2018-10-18 RX ADMIN — DORZOLAMIDE HYDROCHLORIDE AND TIMOLOL MALEATE 1 DROP: 20; 5 SOLUTION/ DROPS OPHTHALMIC at 10:22

## 2018-10-18 ASSESSMENT — PAIN SCALES - GENERAL
PAINLEVEL_OUTOF10: 7
PAINLEVEL_OUTOF10: 6
PAINLEVEL_OUTOF10: 2
PAINLEVEL_OUTOF10: 0
PAINLEVEL_OUTOF10: 4
PAINLEVEL_OUTOF10: 0
PAINLEVEL_OUTOF10: 0

## 2018-10-18 ASSESSMENT — PAIN DESCRIPTION - PAIN TYPE: TYPE: CHRONIC PAIN

## 2018-10-18 ASSESSMENT — PAIN DESCRIPTION - ONSET: ONSET: SUDDEN

## 2018-10-18 ASSESSMENT — PAIN DESCRIPTION - LOCATION: LOCATION: ANKLE

## 2018-10-18 ASSESSMENT — PAIN DESCRIPTION - ORIENTATION: ORIENTATION: LEFT

## 2018-10-18 ASSESSMENT — PAIN DESCRIPTION - DESCRIPTORS: DESCRIPTORS: SHARP;SHOOTING

## 2018-10-18 ASSESSMENT — PAIN DESCRIPTION - FREQUENCY: FREQUENCY: INTERMITTENT

## 2018-10-18 NOTE — PROGRESS NOTES
Labs:   Recent Labs      10/17/18   0609  10/18/18   0447   WBC  7.0  4.7   HGB  12.7  10.7*   HCT  39.7  33.2*   PLT  185  150     Recent Labs      10/17/18   0609  10/18/18   0447   NA  140  139   K  3.6  3.5   CL  105  105   CO2  22  23   BUN  25*  25*   CREATININE  0.6  0.8   CALCIUM  9.1  8.3     Recent Labs      10/18/18   0447   AST  15   ALT  7*   BILITOT  0.5   ALKPHOS  42     Recent Labs      10/17/18   0609   INR  1.18*     No results for input(s): CKTOTAL, TROPONINI in the last 72 hours. Urinalysis:    Lab Results   Component Value Date    NITRU Negative 10/17/2018    WBCUA 70 10/17/2018    BACTERIA 4+ 10/17/2018    RBCUA 3 10/17/2018    BLOODU TRACE 10/17/2018    SPECGRAV 1.011 10/17/2018    GLUCOSEU Negative 10/17/2018    GLUCOSEU NEGATIVE 05/17/2010       Radiology:  XR CHEST 1 VW   Final Result   1. New left lower lobe pulmonary opacity may represent atelectasis,   pneumonia, pleural effusion, and/or pulmonary contusion. Recommend   radiographic follow-up to resolution. 2. No evidence of pneumothorax. No evidence of displaced fractures. 3. Diffuse osteopenia. XR FEMUR LEFT (MIN 2 VIEWS)   Final Result   1. Possible nondisplaced proximal fibular fracture. Severe diffuse osteopenia. 2. No additional acute osseous abnormalities are identified. XR HIP BILATERAL W AP PELVIS (2 VIEWS)   Final Result   1. Possible nondisplaced proximal fibular fracture. Severe diffuse osteopenia. 2. No additional acute osseous abnormalities are identified. XR TIBIA FIBULA LEFT (2 VIEWS)   Final Result   1. Possible nondisplaced proximal fibular fracture. Severe diffuse osteopenia. 2. No additional acute osseous abnormalities are identified. XR ANKLE LEFT (2 VIEWS)   Final Result   1. Acute nondisplaced medial malleolus fracture. 2. Minimally displaced distal fibular fracture. 3. Severe diffuse osteopenia.                  Assessment/Plan:    Active Hospital Problems    Diagnosis Date Noted    Bimalleolar ankle fracture, left, closed, initial encounter [R27.552H]     UTI (urinary tract infection) [N39.0] 02/08/2016    Hypothyroidism [E03.9]     Esophageal reflux [K21.9]      1. Left ankle fracture, immobilized at this time, orthopedic surgeon consulted, pain control. PT, OT pending. 2.  Urinary tract infection, IV antibiotics started yesterday, culture pending. 3. Hypothyroidism on replacement, resume. 4.  GERD, pantoprazole.       Diet: DIET GENERAL;  Code Status: Full Code    PT/OT Eval Status: pending      Rachelle Delcid MD

## 2018-10-18 NOTE — PLAN OF CARE
Problem: Falls - Risk of:  Goal: Will remain free from falls  Will remain free from falls   Outcome: Ongoing  Fall risk assessment completed. Fall precautions in place. Call light within reach. Pt educated on calling for assistance before getting up. Walkway free of clutter. Will continue to monitor. Electronically signed by Enoch Seals RN on 10/18/2018 at 11:20 AM          Problem: Risk for Impaired Skin Integrity  Goal: Tissue integrity - skin and mucous membranes  Structural intactness and normal physiological function of skin and  mucous membranes. Outcome: Ongoing  Pt assessed for skin break down. Skin was warm and dry to touch. Pt able to turn self and regulate head of bed with assistance. Pt reminded to turn or reposition at least every 2 hours to prevent skin breakdown. Will continue to monitor and assess. Electronically signed by Enoch Seals RN on 10/18/2018 at 11:20 AM        Problem: Urinary Elimination:  Goal: Signs and symptoms of infection will decrease  Signs and symptoms of infection will decrease  Outcome: Ongoing  Pt with urinary frequency/urgency. Purewick catheter in place. IV antibiotics administered per orders.   Electronically signed by Enoch Seals RN on 10/18/2018 at 11:21 AM

## 2018-10-18 NOTE — PROGRESS NOTES
Physical Therapy    Facility/Department: [address] ORTHOPEDICS  Initial Assessment  Co Treat with OT  This note serves as patient discharge summary if pt discharges prior to next PT visit      NAME: Anam Ibarra  : 3/12/1922  MRN: [id number]    Date of Service: 10/18/2018    Discharge Recommendations:  3-5 sessions per week   Anam Ibarra scored a 10/24 on the AM-PAC short mobility form. Current research shows that an AM-PAC score of 17 or less is typically not associated with a discharge to the patient's home setting. Based on the patients AM-PAC score and their current functional mobility deficits, it is recommended that the patient have 3-5 sessions per week of Physical Therapy at d/c to increase the patients independence. ssessment: Per Carolina Schwarz APRN - CNP consult note 10-17-18   \"The patient is a 80 y.o. female, awakens to name, very [facility],  who presents (to hospital 10-17-18) with left ankle pain after a fall at her assisted living home. She tripped but otherwise poor recall of the event. Family at bedside. . Pt was independent with walker at home (prior to fall). \"  Work up reveals:  \"L bimalleolar ankle fx with minimal displacement. Plan nonop care per Dr River Lam in splint NWBng 4-6 weeks. \"   Patient also with UTI. PMHx as above. At 10-18-18 session she required Min-Mod Assist for bed mobility, and Mod Assist for slide board transfer bed to the chair. She is familiar with WC mobility as she uses a wheelchair as needed at home. She is motivated to maximize her recovery. Recommend ongoing skilled therapy to maximize safety and independence with functional mobility prior to returning to assisted living as prior. Patient Diagnosis(es): There were no encounter diagnoses. has a past medical history of Carcinoma in situ of colon; Diarrhea; Esophageal reflux; Other extrapyramidal disease and abnormal movement disorder; Pernicious anemia; S/P colonoscopy; Unspecified cataract;  Unspecified hypothyroidism; and Unspecified pruritic disorder. has a past surgical history that includes colectomy; Colonoscopy (4/09); and Hysterectomy. Restrictions  Restrictions/Precautions  Restrictions/Precautions: Fall Risk, Weight Bearing  Lower Extremity Weight Bearing Restrictions  Left Lower Extremity Weight Bearing: Non Weight Bearing  Vision/Hearing  Vision: Impaired  Vision Exceptions: Wears glasses at all times (Son states her vision in R eye is very poor, L eye is better )  Hearing: Exceptions to [facility]  Hearing Exceptions: Hard of hearing/hearing concerns (R ear not functional, L ear better but requires close, clear instruction)     Subjective  General  Chart Reviewed: Yes  Patient assessed for rehabilitation services?: Yes  Additional Pertinent Hx: Per Gama Clements CNP consult note 10-17-18   \"The patient is a 80 y.o. female, awakens to name, very [address],  who presents (to hospital 10-17-18) with left ankle pain after a fall at her assisted living home. She tripped but otherwise poor recall of the event. Family at bedside. . Pt was independent with walker at home (prior to fall). \"  Work up reveals:  \"L bimalleolar ankle fx with minimal displacement. Plan nonop care per Dr Marisol Leslie in splint NWBng 4-6 weeks. \"   Patient also with UTI. PMHx as above. Response To Previous Treatment: Not applicable  Referring Practitioner: Gama Clements CNP  Referral Date : 10/17/18  Subjective  Subjective: agreeable to therapy.     Pain Screening  Patient Currently in Pain: Denies (LLE is sore/stiff; increased pain intermittently in LLE during movement, no value given)  Vital Signs  Patient Currently in Pain: Denies (but winces and reports \"stiffness\" at L ankle during mobility)     Orientation  Orientation  Overall Orientation Status: Within Normal Limits    Social/Functional History  Social/Functional History  Type of Home: Facility (Assisted living)  Home Access: Level entry  Bathroom Shower/Tub:

## 2018-10-18 NOTE — PROGRESS NOTES
Occupational Therapy   Occupational Therapy Initial Assessment  Date: 10/18/2018   Patient Name: Alvaro Rothman  MRN: [id number]     : 3/12/1922    Date of Service: 10/18/2018    Assessment: Pt is 80 y.o. female admitted with L medial & lateral bimalleolar fracture with proximal fibula fracture, NWB LLE; pt tolerated tx session well this date. Pt lives in [place] where she receives assistance 2x/week with bathing but states she can complete sponge baths prn. Pt stated being able to dress herself, perform fxl mobility and transfers, and use 4WW/wheelchair to get to meals, w/c utilized majority of time. Pt now below baseline d/t to NWB status, decreased activity tolerance, decresed balance, and increased pain. Pt demonstrated rolling in bed L<>R requiring Max A; Min A supine>EOB. Slide board transfer utilized EOB>recliner requiring Mod A and Mod verbal cues for safety/direction. Once in recliner, pt performed grooming/hygiene tasks (combing hair, brushing teeth) requiring setup. Pt will benefit from skilled therapy to increase rehab potential; recommend continued therapy 3-5x/week at d/c. Discharge Recommendations:  Patient would benefit from continued therapy after discharge, 3-5 sessions per week     Alvaro Rothman scored a 15/24 on the AM-PAC ADL Inpatient form. Current research shows that an AM-PAC score of 17 or less is typically not associated with a discharge to the patient's home setting. Based on the patients AM-PAC score and their current ADL deficits, it is recommended that the patient have 3-5 sessions per week of Occupational Therapy at d/c to increase the patients independence. Patient Diagnosis(es): There were no encounter diagnoses. has a past medical history of Carcinoma in situ of colon; Diarrhea; Esophageal reflux; Other extrapyramidal disease and abnormal movement disorder; Pernicious anemia; S/P colonoscopy; Unspecified cataract;  Unspecified hypothyroidism; and Unspecified pruritic apartment.)  Transfer Assistance: Independent  Active : No  Occupation: Retired    Objective   Vision: Impaired  Vision Exceptions: Wears glasses at all times (Son states her vision in R eye is very poor, L eye is better )  Hearing: Exceptions to [facility]  Hearing Exceptions: Hard of hearing/hearing concerns (R ear not functional, L ear better but requires close, clear instruction)    Orientation  Overall Orientation Status: Within Functional Limits     Balance  Sitting Balance: Contact guard assistance (CG/SBA while seated EOB)  Standing Balance: Unable to assess(comment)  ADL  Grooming: Setup (Pt combed hair, brushed teeth while seated in recliner)  LE Dressing: Maximum assistance (Therapist donned nonslip sock; donned brief)  Additional Comments: Anticipate pt require setup for feeding; SBA for UB dressing/bathing; Max A for LB dressing/bathing, toileting based on ROM, activity tolerance, strength     Bed mobility  Rolling to Left: Moderate assistance;Maximum assistance  Rolling to Right: Moderate assistance;Maximum assistance  Supine to Sit: Minimal assistance  Sit to Supine: Unable to assess  Transfers  Slide Board: Moderate assistance (Mod verbal cues, transfered EOB>recliner)     Cognition  Overall Cognitive Status: WFL     Sensation  Overall Sensation Status: WNL (including toes on L)      LUE AROM (degrees)  LUE AROM : WFL  RUE AROM (degrees)  RUE AROM : WFL  LUE Strength  Gross LUE Strength: WFL  RUE Strength  Gross RUE Strength: WFL    Assessment   Performance deficits / Impairments: Decreased functional mobility ; Decreased ADL status; Decreased high-level IADLs;Decreased balance;Decreased endurance  Assessment: Pt is 80 y.o. female admitted with L medial & lateral bimalleolar fracture with proximal fibula fracture; pt tolerated tx session well this date. Pt lives in [facility] where she receives assistance 2x/week with bathing but states she can complete sponge baths prn.  Pt stated being able to dress herself,

## 2018-10-18 NOTE — CARE COORDINATION
Spoke with patients son who is now requesting referral [facility], [facility] since patient is from [facility]. Referral faxed; awaiting call back. Will need precert and hens.    Electronically signed by Yun Donaldson on 10/18/2018 at 12:37 PM   #[id number]

## 2018-10-19 ENCOUNTER — APPOINTMENT (OUTPATIENT)
Dept: GENERAL RADIOLOGY | Age: 83
DRG: 563 | End: 2018-10-19
Payer: COMMERCIAL

## 2018-10-19 LAB
ORGANISM: ABNORMAL
URINE CULTURE, ROUTINE: ABNORMAL
URINE CULTURE, ROUTINE: ABNORMAL

## 2018-10-19 PROCEDURE — 71046 X-RAY EXAM CHEST 2 VIEWS: CPT

## 2018-10-19 PROCEDURE — 2580000003 HC RX 258: Performed by: INTERNAL MEDICINE

## 2018-10-19 PROCEDURE — 97530 THERAPEUTIC ACTIVITIES: CPT

## 2018-10-19 PROCEDURE — 2580000003 HC RX 258: Performed by: NURSE PRACTITIONER

## 2018-10-19 PROCEDURE — 97535 SELF CARE MNGMENT TRAINING: CPT

## 2018-10-19 PROCEDURE — 6360000002 HC RX W HCPCS: Performed by: INTERNAL MEDICINE

## 2018-10-19 PROCEDURE — 96372 THER/PROPH/DIAG INJ SC/IM: CPT

## 2018-10-19 PROCEDURE — 94760 N-INVAS EAR/PLS OXIMETRY 1: CPT

## 2018-10-19 PROCEDURE — 6370000000 HC RX 637 (ALT 250 FOR IP): Performed by: INTERNAL MEDICINE

## 2018-10-19 PROCEDURE — 96366 THER/PROPH/DIAG IV INF ADDON: CPT

## 2018-10-19 PROCEDURE — 1200000000 HC SEMI PRIVATE

## 2018-10-19 RX ORDER — TRIAMCINOLONE ACETONIDE 1 MG/G
CREAM TOPICAL
Qty: 1 TUBE | Refills: 0 | Status: ON HOLD | OUTPATIENT
Start: 2018-10-19 | End: 2020-08-14 | Stop reason: HOSPADM

## 2018-10-19 RX ORDER — SENNA AND DOCUSATE SODIUM 50; 8.6 MG/1; MG/1
1 TABLET, FILM COATED ORAL 2 TIMES DAILY
Status: DISCONTINUED | OUTPATIENT
Start: 2018-10-19 | End: 2018-10-22 | Stop reason: HOSPADM

## 2018-10-19 RX ORDER — CEFUROXIME AXETIL 500 MG/1
500 TABLET ORAL 2 TIMES DAILY
Qty: 10 TABLET | Refills: 0 | Status: SHIPPED | OUTPATIENT
Start: 2018-10-19 | End: 2018-10-24

## 2018-10-19 RX ORDER — CLOTRIMAZOLE 1 %
CREAM (GRAM) TOPICAL
Qty: 1 TUBE | Refills: 1 | Status: SHIPPED | OUTPATIENT
Start: 2018-10-19 | End: 2018-10-26

## 2018-10-19 RX ORDER — POTASSIUM CHLORIDE 20 MEQ/1
10 TABLET, EXTENDED RELEASE ORAL DAILY
Qty: 30 TABLET | Refills: 0 | Status: ON HOLD | OUTPATIENT
Start: 2018-10-19 | End: 2020-08-14 | Stop reason: HOSPADM

## 2018-10-19 RX ADMIN — SODIUM CHLORIDE: 9 INJECTION, SOLUTION INTRAVENOUS at 18:21

## 2018-10-19 RX ADMIN — SENNOSIDES AND DOCUSATE SODIUM 1 TABLET: 8.6; 5 TABLET ORAL at 21:35

## 2018-10-19 RX ADMIN — METOPROLOL TARTRATE 25 MG: 25 TABLET ORAL at 21:35

## 2018-10-19 RX ADMIN — VITAMIN D, TAB 1000IU (100/BT) 2000 UNITS: 25 TAB at 09:59

## 2018-10-19 RX ADMIN — METOPROLOL TARTRATE 25 MG: 25 TABLET ORAL at 09:59

## 2018-10-19 RX ADMIN — CEFTRIAXONE 1 G: 1 INJECTION, POWDER, FOR SOLUTION INTRAMUSCULAR; INTRAVENOUS at 06:27

## 2018-10-19 RX ADMIN — PANTOPRAZOLE SODIUM 40 MG: 40 TABLET, DELAYED RELEASE ORAL at 06:26

## 2018-10-19 RX ADMIN — DORZOLAMIDE HYDROCHLORIDE AND TIMOLOL MALEATE 1 DROP: 20; 5 SOLUTION/ DROPS OPHTHALMIC at 21:36

## 2018-10-19 RX ADMIN — ENOXAPARIN SODIUM 40 MG: 40 INJECTION SUBCUTANEOUS at 09:59

## 2018-10-19 RX ADMIN — CLOTRIMAZOLE: 10 CREAM TOPICAL at 10:00

## 2018-10-19 RX ADMIN — LEVOTHYROXINE SODIUM 125 MCG: 125 TABLET ORAL at 09:59

## 2018-10-19 RX ADMIN — ACETAMINOPHEN 650 MG: 325 TABLET, FILM COATED ORAL at 09:59

## 2018-10-19 RX ADMIN — LATANOPROST 1 DROP: 50 SOLUTION OPHTHALMIC at 21:37

## 2018-10-19 RX ADMIN — DORZOLAMIDE HYDROCHLORIDE AND TIMOLOL MALEATE 1 DROP: 20; 5 SOLUTION/ DROPS OPHTHALMIC at 10:00

## 2018-10-19 RX ADMIN — TROSPIUM CHLORIDE 20 MG: 20 TABLET ORAL at 21:35

## 2018-10-19 RX ADMIN — CALCIUM 500 MG: 500 TABLET ORAL at 09:59

## 2018-10-19 RX ADMIN — LISINOPRIL 2.5 MG: 5 TABLET ORAL at 09:59

## 2018-10-19 RX ADMIN — Medication 10 ML: at 09:59

## 2018-10-19 RX ADMIN — CLOTRIMAZOLE: 10 CREAM TOPICAL at 21:36

## 2018-10-19 RX ADMIN — TRIAMCINOLONE ACETONIDE: 1 CREAM TOPICAL at 21:36

## 2018-10-19 RX ADMIN — TRIAMCINOLONE ACETONIDE: 1 CREAM TOPICAL at 10:00

## 2018-10-19 RX ADMIN — MAGNESIUM HYDROXIDE 30 ML: 400 SUSPENSION ORAL at 23:08

## 2018-10-19 ASSESSMENT — PAIN DESCRIPTION - PAIN TYPE
TYPE: CHRONIC PAIN
TYPE: CHRONIC PAIN

## 2018-10-19 ASSESSMENT — PAIN DESCRIPTION - ORIENTATION
ORIENTATION: LEFT

## 2018-10-19 ASSESSMENT — PAIN SCALES - GENERAL
PAINLEVEL_OUTOF10: 0

## 2018-10-19 ASSESSMENT — PAIN DESCRIPTION - LOCATION
LOCATION: ANKLE

## 2018-10-19 ASSESSMENT — PAIN DESCRIPTION - ONSET: ONSET: SUDDEN

## 2018-10-19 ASSESSMENT — PAIN DESCRIPTION - FREQUENCY: FREQUENCY: INTERMITTENT

## 2018-10-19 ASSESSMENT — PAIN DESCRIPTION - DESCRIPTORS: DESCRIPTORS: SHARP;SHOOTING

## 2018-10-19 NOTE — PROGRESS NOTES
minimal displacement. Plan nonop care per Dr Donnell Rae in splint NWBng 4-6 weeks. \"   Patient also with UTI. PMHx as above. At 10-18-18 session she required [address] for bed mobility, and slide board transfer bed to the chair. She transferred sit to stand To Stedy standing lift with Min-Mod A of 2, and maintianed L LE NWBng with cues. She is familiar with WC mobility as she uses a wheelchair as needed at home. She is motivated to maximize her recovery. Recommend ongoing skilled therapy to maximize safety and independence with functional mobility prior to returning to assisted living as prior. Treatment Diagnosis: Impaired functional mobility  Prognosis: Good  Patient Education: NWMariolag L DANAY. PT poc. REQUIRES PT FOLLOW UP: Yes  Activity Tolerance  Activity Tolerance: Patient Tolerated treatment well         Plan   Plan  Times per week: daily  Current Treatment Recommendations: Functional Mobility Training, Transfer Training, Wheelchair Mobility Training, Safety Education & Training, Patient/Caregiver Education & Training  Safety Devices  Type of devices: Gait belt, Patient at risk for falls, Left in chair, Call light within reach, Chair alarm in place, Nurse notified (RN [facility] notified. )    AM-PAC Score  AM-PAC Inpatient Mobility Raw Score : 11  AM-PAC Inpatient T-Scale Score : 33.86  Mobility Inpatient CMS 0-100% Score: 72.57  Mobility Inpatient CMS G-Code Modifier : CL     Goals  Short term goals  Time Frame for Short term goals: By acute DC. Short term goal 1: Bed mobility CGA  Short term goal 2: Transfer bed<>chair with Min A (slide board, stnd pivot, or Stedy). 10-19-18 goal met  Short term goal 3: WC mobilitly 50' level tile, Min A 1. Patient Goals   Patient goals : \"To get better. \"       Therapy Time   Individual Concurrent Group Co-treatment   Time In 1010         Time Out 1053         Minutes [address]  Electronically signed by Ac Huntley, [address] on

## 2018-10-19 NOTE — PROGRESS NOTES
Pt doing okay. Tylenol given for pain. Lungs clear, diminished. IS given and encouraged. AM meds completed. Pt declined wanting to take senokot and wants to wait to take something for constipation until gets to SNF. Will monitor.   Electronically signed by Yaa Cheung RN on 10/19/2018 at 10:14 AM

## 2018-10-19 NOTE — PROGRESS NOTES
Occupational Therapy  Facility/Department: [address] ORTHOPEDICS  Daily Treatment Note  NAME: Estefania Yuan  : 3/12/1922  MRN: [id number]    Date of Service: 10/19/2018    Assessment: Discussed with OTR am pac score is 15 which indicates need for continued skilled OT to increase Pitcher and return to PLOF. Improvements noted this date in slide board transfer and patient able to  [address] [facility] stedy. Due to NWB patient is a high fall risk       Estefania Yuan scored a 15/24 on the AM-PAC ADL Inpatient form. Current research shows that an AM-PAC score of 17 or less is typically not associated with a discharge to the patient's home setting. Based on the patients AM-PAC score and their current ADL deficits, it is recommended that the patient have 3-5 sessions per week of Occupational Therapy at d/c to increase the patients independence. If discharged prior to next OT session please see last daily note for discharge status      Discharge Recommendations:  Patient would benefit from continued therapy after discharge, 3-5 sessions per week       Patient Diagnosis(es): The primary encounter diagnosis was Closed fracture of left ankle, initial encounter. A diagnosis of Urinary tract infection without hematuria, site unspecified was also pertinent to this visit. has a past medical history of Carcinoma in situ of colon; Diarrhea; Esophageal reflux; Other extrapyramidal disease and abnormal movement disorder; Pernicious anemia; S/P colonoscopy; Unspecified cataract; Unspecified hypothyroidism; and Unspecified pruritic disorder. has a past surgical history that includes colectomy; Colonoscopy (); and Hysterectomy.     Restrictions  Restrictions/Precautions  Restrictions/Precautions: Fall Risk, Weight Bearing  Lower Extremity Weight Bearing Restrictions  Left Lower Extremity Weight Bearing: Non Weight Bearing  Subjective   General  Chart Reviewed: Yes  Patient assessed for rehabilitation services?:

## 2018-10-20 PROCEDURE — 94760 N-INVAS EAR/PLS OXIMETRY 1: CPT

## 2018-10-20 PROCEDURE — 97530 THERAPEUTIC ACTIVITIES: CPT

## 2018-10-20 PROCEDURE — 1200000000 HC SEMI PRIVATE

## 2018-10-20 PROCEDURE — 6370000000 HC RX 637 (ALT 250 FOR IP): Performed by: INTERNAL MEDICINE

## 2018-10-20 PROCEDURE — 2580000003 HC RX 258: Performed by: INTERNAL MEDICINE

## 2018-10-20 PROCEDURE — 97535 SELF CARE MNGMENT TRAINING: CPT

## 2018-10-20 PROCEDURE — 6360000002 HC RX W HCPCS: Performed by: INTERNAL MEDICINE

## 2018-10-20 PROCEDURE — 94761 N-INVAS EAR/PLS OXIMETRY MLT: CPT

## 2018-10-20 PROCEDURE — 2580000003 HC RX 258: Performed by: NURSE PRACTITIONER

## 2018-10-20 PROCEDURE — 96372 THER/PROPH/DIAG INJ SC/IM: CPT

## 2018-10-20 PROCEDURE — 97542 WHEELCHAIR MNGMENT TRAINING: CPT

## 2018-10-20 PROCEDURE — 96366 THER/PROPH/DIAG IV INF ADDON: CPT

## 2018-10-20 RX ADMIN — LEVOTHYROXINE SODIUM 125 MCG: 125 TABLET ORAL at 08:45

## 2018-10-20 RX ADMIN — ENOXAPARIN SODIUM 40 MG: 40 INJECTION SUBCUTANEOUS at 08:45

## 2018-10-20 RX ADMIN — SENNOSIDES AND DOCUSATE SODIUM 1 TABLET: 8.6; 5 TABLET ORAL at 08:45

## 2018-10-20 RX ADMIN — CEFTRIAXONE 1 G: 1 INJECTION, POWDER, FOR SOLUTION INTRAMUSCULAR; INTRAVENOUS at 06:40

## 2018-10-20 RX ADMIN — DORZOLAMIDE HYDROCHLORIDE AND TIMOLOL MALEATE 1 DROP: 20; 5 SOLUTION/ DROPS OPHTHALMIC at 08:47

## 2018-10-20 RX ADMIN — VITAMIN D, TAB 1000IU (100/BT) 2000 UNITS: 25 TAB at 08:45

## 2018-10-20 RX ADMIN — METOPROLOL TARTRATE 25 MG: 25 TABLET ORAL at 20:24

## 2018-10-20 RX ADMIN — CLOTRIMAZOLE: 10 CREAM TOPICAL at 08:47

## 2018-10-20 RX ADMIN — TRIAMCINOLONE ACETONIDE: 1 CREAM TOPICAL at 20:23

## 2018-10-20 RX ADMIN — LATANOPROST 1 DROP: 50 SOLUTION OPHTHALMIC at 20:24

## 2018-10-20 RX ADMIN — TROSPIUM CHLORIDE 20 MG: 20 TABLET ORAL at 21:24

## 2018-10-20 RX ADMIN — PANTOPRAZOLE SODIUM 40 MG: 40 TABLET, DELAYED RELEASE ORAL at 06:41

## 2018-10-20 RX ADMIN — METOPROLOL TARTRATE 25 MG: 25 TABLET ORAL at 08:45

## 2018-10-20 RX ADMIN — CALCIUM 500 MG: 500 TABLET ORAL at 08:45

## 2018-10-20 RX ADMIN — SENNOSIDES AND DOCUSATE SODIUM 1 TABLET: 8.6; 5 TABLET ORAL at 20:24

## 2018-10-20 RX ADMIN — DORZOLAMIDE HYDROCHLORIDE AND TIMOLOL MALEATE 1 DROP: 20; 5 SOLUTION/ DROPS OPHTHALMIC at 20:25

## 2018-10-20 RX ADMIN — SODIUM CHLORIDE: 9 INJECTION, SOLUTION INTRAVENOUS at 08:43

## 2018-10-20 RX ADMIN — Medication 10 ML: at 20:24

## 2018-10-20 RX ADMIN — TRIAMCINOLONE ACETONIDE: 1 CREAM TOPICAL at 08:47

## 2018-10-20 RX ADMIN — CLOTRIMAZOLE: 10 CREAM TOPICAL at 20:24

## 2018-10-20 RX ADMIN — LISINOPRIL 2.5 MG: 5 TABLET ORAL at 08:45

## 2018-10-20 NOTE — PROGRESS NOTES
10/18/18   0447   WBC  4.7   HGB  10.7*   HCT  33.2*   PLT  150     Recent Labs      10/18/18   0447   NA  139   K  3.5   CL  105   CO2  23   BUN  25*   CREATININE  0.8   CALCIUM  8.3     Recent Labs      10/18/18   0447   AST  15   ALT  7*   BILITOT  0.5   ALKPHOS  42     No results for input(s): INR in the last 72 hours. No results for input(s): [place]  in the last 72 hours. Urinalysis:    Lab Results   Component Value Date    NITRU Negative 10/17/2018    WBCUA 70 10/17/2018    BACTERIA 4+ 10/17/2018    RBCUA 3 10/17/2018    BLOODU TRACE 10/17/2018    SPECGRAV 1.011 10/17/2018    GLUCOSEU Negative 10/17/2018    GLUCOSEU NEGATIVE 05/17/2010       Radiology:  XR CHEST STANDARD (2 VW)   Final Result   Worsen multifocal bilateral atelectasis likely related to poor inspiratory   effort on today's study         XR CHEST 1 VW   Final Result   1. New left lower lobe pulmonary opacity may represent atelectasis,   pneumonia, pleural effusion, and/or pulmonary contusion. Recommend   radiographic follow-up to resolution. 2. No evidence of pneumothorax. No evidence of displaced fractures. 3. Diffuse osteopenia. XR FEMUR LEFT (MIN 2 VIEWS)   Final Result   1. Possible nondisplaced proximal fibular fracture. Severe diffuse osteopenia. 2. No additional acute osseous abnormalities are identified. XR HIP BILATERAL W AP PELVIS (2 VIEWS)   Final Result   1. Possible nondisplaced proximal fibular fracture. Severe diffuse osteopenia. 2. No additional acute osseous abnormalities are identified. XR TIBIA FIBULA LEFT (2 VIEWS)   Final Result   1. Possible nondisplaced proximal fibular fracture. Severe diffuse osteopenia. 2. No additional acute osseous abnormalities are identified. XR ANKLE LEFT (2 VIEWS)   Final Result   1. Acute nondisplaced medial malleolus fracture. 2. Minimally displaced distal fibular fracture. 3. Severe diffuse osteopenia.

## 2018-10-20 NOTE — PLAN OF CARE
Problem: Falls - Risk of:  Goal: Will remain free from falls  Will remain free from falls   Outcome: Ongoing  Fall risk assessment completed. Fall precautions in place. Call light within reach. Pt educated on calling for assistance before getting up. Walkway free of clutter. Will continue to monitor. Electronically signed by Ben Abarca RN on 10/20/2018 at 3:56 PM      Goal: Absence of physical injury  Absence of physical injury   Outcome: Ongoing  Fall risk assessment completed. Fall precautions in place. Call light within reach. Pt educated on calling for assistance before getting up. Walkway free of clutter. Will continue to monitor. Electronically signed by Ben Abarca RN on 10/20/2018 at 3:56 PM    Problem: Risk for Impaired Skin Integrity  Goal: Tissue integrity - skin and mucous membranes  Structural intactness and normal physiological function of skin and  mucous membranes. Outcome: Ongoing  Pt assessed for skin break down. Skin was warm and dry to touch. Will turn or reposition at least every 2 hours to prevent skin breakdown. Will continue to monitor and assess. Electronically signed by Ben Abarca RN on 10/20/2018 at 3:58 PM      Problem: Pain:  Goal: Pain level will decrease  Pain level will decrease   Outcome: Ongoing  Pt assessed for pain. Pt satisfied with pain relief thus far d/t various interventions. Will reassess and continue to monitor. Electronically signed by Ben Abarca RN on 10/20/2018 at 3:57 PM    Goal: Control of acute pain  Control of acute pain   Outcome: Ongoing  Pt assessed for pain. Pt satisfied with pain relief thus far d/t various interventions. Will reassess and continue to monitor. Electronically signed by Ben Abarca RN on 10/20/2018 at 3:57 PM  Goal: Control of chronic pain  Control of chronic pain   Outcome: Ongoing  Pt assessed for pain. Pt satisfied with pain relief thus far d/t various interventions. Will reassess and continue to monitor.  Electronically signed by Vishal Acosta RN on 10/20/2018 at 3:57 PM

## 2018-10-20 NOTE — PROGRESS NOTES
trunk)  Sitting - Static: Good  Sitting - Dynamic: Good    Assessment   Body structures, Functions, Activity limitations: Decreased functional mobility ; Decreased ADL status; Decreased vision/visual deficit; Decreased strength  Assessment: Per Danita Robles APRN - CNP consult note 10-17-18   \"The patient is a 80 y.o. female, awakens to name, very [address],  who presents (to hospital 10-17-18) with left ankle pain after a fall at her assisted living home. She tripped but otherwise poor recall of the event. Family at bedside. . Pt was independent with walker at home (prior to fall). \"  Work up reveals:  \"L bimalleolar ankle fx with minimal displacement. Plan nonop care per Dr Morgan Vanegas in splint NWBng 4-6 weeks. \"   Patient also with UTI. PMHx as above. At 10-20-18 session she required Standby Assist, extra time, and Mod effort for supine to sit , and transferred bed to [facility] with Min A using slide board. She performed WC mobility x 60' with extra time and SBA-CGA. This appeared fatiguing to patient. She is familiar with WC mobility as she uses a wheelchair as needed at home, and will likely be functional while NWBng from a wheelchair level. She is motivated to maximize her recovery. Recommend ongoing skilled therapy to maximize safety and independence with functional mobility prior to returning to assisted living as prior. Anticipate functional mobility from a WC level. Treatment Diagnosis: Impaired functional mobility  Prognosis: Good  Patient Education: NWBng L DANAY. PT poc.    REQUIRES PT FOLLOW UP: Yes  Activity Tolerance  Activity Tolerance: Patient Tolerated treatment well       Plan   Plan  Times per week: daily  Current Treatment Recommendations: Functional Mobility Training, Transfer Training, Wheelchair Mobility Training, Safety Education & Training, Patient/Caregiver Education & Training  Safety Devices  Type of devices: Gait belt, Patient at risk for falls, Left in chair, Call light within reach, Chair alarm in place, Nurse notified (Patient seated on waffle cushion. PCA Carlyn Ayala notified. )    AM-PAC Score  AM-PAC Inpatient Mobility Raw Score : 11  AM-PAC Inpatient T-Scale Score : 33.86  Mobility Inpatient CMS 0-100% Score: 72.57  Mobility Inpatient CMS G-Code Modifier : CL     Goals  Short term goals  Time Frame for Short term goals: By acute DC. Short term goal 1: Bed mobility CGA. Short term goal 2: Transfer bed<>chair with Min A (slide board, stnd pivot, or Stedy). 10-19-18 goal met  Short term goal 3: WC mobilitly 50' level tile, Min A 1. 10-20-18 goal met. Patient Goals   Patient goals : \"To get better. \"       Therapy Time   Individual Concurrent Group Co-treatment   Time In 7464         Time Out 1300         Minutes 54            Claudette Lock, PT  Electronically signed by Fausto Uriostegui, PT [id number] on 10/20/2018 at 1:09 PM

## 2018-10-21 PROCEDURE — 96372 THER/PROPH/DIAG INJ SC/IM: CPT

## 2018-10-21 PROCEDURE — 1200000000 HC SEMI PRIVATE

## 2018-10-21 PROCEDURE — 6360000002 HC RX W HCPCS: Performed by: INTERNAL MEDICINE

## 2018-10-21 PROCEDURE — 96366 THER/PROPH/DIAG IV INF ADDON: CPT

## 2018-10-21 PROCEDURE — 94760 N-INVAS EAR/PLS OXIMETRY 1: CPT

## 2018-10-21 PROCEDURE — 6370000000 HC RX 637 (ALT 250 FOR IP): Performed by: INTERNAL MEDICINE

## 2018-10-21 PROCEDURE — 2580000003 HC RX 258: Performed by: INTERNAL MEDICINE

## 2018-10-21 PROCEDURE — 2580000003 HC RX 258: Performed by: NURSE PRACTITIONER

## 2018-10-21 RX ADMIN — CEFTRIAXONE 1 G: 1 INJECTION, POWDER, FOR SOLUTION INTRAMUSCULAR; INTRAVENOUS at 06:07

## 2018-10-21 RX ADMIN — CLOTRIMAZOLE: 10 CREAM TOPICAL at 22:38

## 2018-10-21 RX ADMIN — LATANOPROST 1 DROP: 50 SOLUTION OPHTHALMIC at 22:38

## 2018-10-21 RX ADMIN — SENNOSIDES AND DOCUSATE SODIUM 1 TABLET: 8.6; 5 TABLET ORAL at 10:43

## 2018-10-21 RX ADMIN — VITAMIN D, TAB 1000IU (100/BT) 2000 UNITS: 25 TAB at 10:43

## 2018-10-21 RX ADMIN — METOPROLOL TARTRATE 25 MG: 25 TABLET ORAL at 22:38

## 2018-10-21 RX ADMIN — TRIAMCINOLONE ACETONIDE: 1 CREAM TOPICAL at 10:48

## 2018-10-21 RX ADMIN — SODIUM CHLORIDE: 9 INJECTION, SOLUTION INTRAVENOUS at 18:12

## 2018-10-21 RX ADMIN — TRIAMCINOLONE ACETONIDE: 1 CREAM TOPICAL at 22:38

## 2018-10-21 RX ADMIN — TROSPIUM CHLORIDE 20 MG: 20 TABLET ORAL at 22:38

## 2018-10-21 RX ADMIN — LISINOPRIL 2.5 MG: 5 TABLET ORAL at 10:44

## 2018-10-21 RX ADMIN — CLOTRIMAZOLE: 10 CREAM TOPICAL at 10:48

## 2018-10-21 RX ADMIN — SENNOSIDES AND DOCUSATE SODIUM 1 TABLET: 8.6; 5 TABLET ORAL at 22:38

## 2018-10-21 RX ADMIN — METOPROLOL TARTRATE 25 MG: 25 TABLET ORAL at 10:43

## 2018-10-21 RX ADMIN — LEVOTHYROXINE SODIUM 125 MCG: 125 TABLET ORAL at 10:44

## 2018-10-21 RX ADMIN — ACETAMINOPHEN 650 MG: 325 TABLET, FILM COATED ORAL at 18:18

## 2018-10-21 RX ADMIN — DORZOLAMIDE HYDROCHLORIDE AND TIMOLOL MALEATE 1 DROP: 20; 5 SOLUTION/ DROPS OPHTHALMIC at 22:38

## 2018-10-21 RX ADMIN — Medication 10 ML: at 22:38

## 2018-10-21 RX ADMIN — CALCIUM 500 MG: 500 TABLET ORAL at 10:44

## 2018-10-21 RX ADMIN — ENOXAPARIN SODIUM 40 MG: 40 INJECTION SUBCUTANEOUS at 10:43

## 2018-10-21 RX ADMIN — PANTOPRAZOLE SODIUM 40 MG: 40 TABLET, DELAYED RELEASE ORAL at 06:07

## 2018-10-21 RX ADMIN — DORZOLAMIDE HYDROCHLORIDE AND TIMOLOL MALEATE 1 DROP: 20; 5 SOLUTION/ DROPS OPHTHALMIC at 10:49

## 2018-10-21 ASSESSMENT — PAIN SCALES - GENERAL
PAINLEVEL_OUTOF10: 2
PAINLEVEL_OUTOF10: 0

## 2018-10-21 ASSESSMENT — PAIN DESCRIPTION - DESCRIPTORS: DESCRIPTORS: DISCOMFORT

## 2018-10-21 ASSESSMENT — PAIN DESCRIPTION - LOCATION: LOCATION: ANKLE

## 2018-10-21 ASSESSMENT — PAIN DESCRIPTION - FREQUENCY: FREQUENCY: INTERMITTENT

## 2018-10-21 ASSESSMENT — PAIN DESCRIPTION - PAIN TYPE: TYPE: ACUTE PAIN

## 2018-10-21 ASSESSMENT — PAIN DESCRIPTION - ORIENTATION: ORIENTATION: RIGHT

## 2018-10-21 ASSESSMENT — PAIN DESCRIPTION - PROGRESSION: CLINICAL_PROGRESSION: NOT CHANGED

## 2018-10-21 ASSESSMENT — PAIN DESCRIPTION - ONSET: ONSET: ON-GOING

## 2018-10-21 NOTE — PROGRESS NOTES
Hospitalist Progress Note      PCP: Calixto Ingram MD    Date of Admission: 10/17/2018        Subjective: no new complaints. Medications:  Reviewed    Infusion Medications    sodium chloride 75 mL/hr at 10/20/18 0843     Scheduled Medications    sennosides-docusate sodium  1 tablet Oral BID    calcium elemental  500 mg Oral Daily with breakfast    dorzolamide-timolol  1 drop Both Eyes BID    latanoprost  1 drop Both Eyes Nightly    levothyroxine  125 mcg Oral Daily    lisinopril  2.5 mg Oral Daily    metoprolol tartrate  25 mg Oral BID    vitamin D  2,000 Units Oral Daily    cefTRIAXone (ROCEPHIN) IV  1 g Intravenous Q24H    sodium chloride flush  10 mL Intravenous 2 times per day    enoxaparin  40 mg Subcutaneous Daily    pantoprazole  40 mg Oral QAM AC    trospium  20 mg Oral Nightly    clotrimazole   Topical BID    triamcinolone   Topical BID     PRN Meds: sodium chloride flush, magnesium hydroxide, ondansetron, polyethylene glycol, acetaminophen      Intake/Output Summary (Last 24 hours) at 10/21/18 1050  Last data filed at 10/21/18 0607   Gross per 24 hour   Intake             1480 ml   Output             1575 ml   Net              -95 ml       Physical Exam Performed:    BP (!) 138/95   Pulse 106   Temp 97.5 °F (36.4 °C) (Oral)   Resp 17   Ht 5' 2\" (1.575 m)   Wt 169 lb 5 oz (76.8 kg)   SpO2 92%   BMI 30.97 kg/m²     General appearance: No apparent distress. Neck: Supple. Respiratory:  Normal respiratory effort. Clear to auscultation, bilaterally without Rales/Wheezes/Rhonchi. Cardiovascular: Regular rate and rhythm with normal S1/S2 without murmurs, rubs or gallops. Abdomen: Soft, non-tender  Musculoskeletal: No clubbing, immobilized left foot. Skin: Skin color, texture, turgor normal.  No rashes or lesions.   Neurologic:  No focal weakness   Psychiatric: Alert and oriented  Capillary Refill: Brisk,< 3 seconds   Peripheral Pulses: +2 palpable, equal bilaterally osteopenia. Assessment/Plan:    Active Hospital Problems    Diagnosis Date Noted    Bimalleolar ankle fracture, left, closed, initial encounter [S82.895M]     UTI (urinary tract infection) [N39.0] 02/08/2016    Hypothyroidism [E03.9]     Esophageal reflux [K21.9]      1.  Left ankle fracture, immobilized at this time, orthopedic surgeon consulted, pain control. Discussed Peer to peer, patient is not weight bearing and recommended ECF, ordered  consult. 2.  Urinary tract infection, IV antibiotics started since admission, E coli. Continue for now. 3. Hypothyroidism on replacement, resume. 4.  GERD, pantoprazole. Diet: DIET GENERAL;  Code Status: Full Code        Dispo - awaiting placement.      Genny Hall MD

## 2018-10-22 VITALS
HEIGHT: 62 IN | RESPIRATION RATE: 18 BRPM | WEIGHT: 169.31 LBS | TEMPERATURE: 98.2 F | HEART RATE: 96 BPM | DIASTOLIC BLOOD PRESSURE: 81 MMHG | BODY MASS INDEX: 31.16 KG/M2 | SYSTOLIC BLOOD PRESSURE: 129 MMHG | OXYGEN SATURATION: 92 %

## 2018-10-22 LAB
BLOOD CULTURE, ROUTINE: NORMAL
CULTURE, BLOOD 2: NORMAL

## 2018-10-22 PROCEDURE — 6370000000 HC RX 637 (ALT 250 FOR IP): Performed by: INTERNAL MEDICINE

## 2018-10-22 PROCEDURE — 96372 THER/PROPH/DIAG INJ SC/IM: CPT

## 2018-10-22 PROCEDURE — 2700000000 HC OXYGEN THERAPY PER DAY

## 2018-10-22 PROCEDURE — 94761 N-INVAS EAR/PLS OXIMETRY MLT: CPT

## 2018-10-22 PROCEDURE — 6360000002 HC RX W HCPCS: Performed by: INTERNAL MEDICINE

## 2018-10-22 PROCEDURE — 2580000003 HC RX 258: Performed by: INTERNAL MEDICINE

## 2018-10-22 PROCEDURE — 94760 N-INVAS EAR/PLS OXIMETRY 1: CPT

## 2018-10-22 PROCEDURE — 96366 THER/PROPH/DIAG IV INF ADDON: CPT

## 2018-10-22 RX ORDER — POLYETHYLENE GLYCOL 3350 17 G/17G
17 POWDER, FOR SOLUTION ORAL DAILY
Status: DISCONTINUED | OUTPATIENT
Start: 2018-10-22 | End: 2018-10-22 | Stop reason: HOSPADM

## 2018-10-22 RX ADMIN — ENOXAPARIN SODIUM 40 MG: 40 INJECTION SUBCUTANEOUS at 09:13

## 2018-10-22 RX ADMIN — CLOTRIMAZOLE: 10 CREAM TOPICAL at 09:14

## 2018-10-22 RX ADMIN — TRIAMCINOLONE ACETONIDE: 1 CREAM TOPICAL at 09:14

## 2018-10-22 RX ADMIN — VITAMIN D, TAB 1000IU (100/BT) 2000 UNITS: 25 TAB at 09:12

## 2018-10-22 RX ADMIN — LEVOTHYROXINE SODIUM 125 MCG: 125 TABLET ORAL at 09:12

## 2018-10-22 RX ADMIN — BISACODYL 10 MG: 5 TABLET, COATED ORAL at 11:37

## 2018-10-22 RX ADMIN — CEFTRIAXONE 1 G: 1 INJECTION, POWDER, FOR SOLUTION INTRAMUSCULAR; INTRAVENOUS at 06:43

## 2018-10-22 RX ADMIN — CALCIUM 500 MG: 500 TABLET ORAL at 09:12

## 2018-10-22 RX ADMIN — POLYETHYLENE GLYCOL 3350 17 G: 17 POWDER, FOR SOLUTION ORAL at 11:37

## 2018-10-22 RX ADMIN — METOPROLOL TARTRATE 25 MG: 25 TABLET ORAL at 09:12

## 2018-10-22 RX ADMIN — DORZOLAMIDE HYDROCHLORIDE AND TIMOLOL MALEATE 1 DROP: 20; 5 SOLUTION/ DROPS OPHTHALMIC at 09:14

## 2018-10-22 RX ADMIN — ACETAMINOPHEN 650 MG: 325 TABLET, FILM COATED ORAL at 09:23

## 2018-10-22 RX ADMIN — SENNOSIDES AND DOCUSATE SODIUM 1 TABLET: 8.6; 5 TABLET ORAL at 09:13

## 2018-10-22 RX ADMIN — LISINOPRIL 2.5 MG: 5 TABLET ORAL at 09:12

## 2018-10-22 RX ADMIN — PANTOPRAZOLE SODIUM 40 MG: 40 TABLET, DELAYED RELEASE ORAL at 06:42

## 2018-10-22 ASSESSMENT — PAIN SCALES - GENERAL
PAINLEVEL_OUTOF10: 3
PAINLEVEL_OUTOF10: 0

## 2018-10-22 NOTE — PROGRESS NOTES
Hospital Problems    Diagnosis Date Noted    Bimalleolar ankle fracture, left, closed, initial encounter [Y27.747F]     UTI (urinary tract infection) [N39.0] 02/08/2016    Hypothyroidism [E03.9]     Esophageal reflux [K21.9]      Left ankle fracture, immobilized at this time, orthopedic surgeon consulted, pain control. Partner discussed Peer to peer - ?result - precert for ECF pending. Pt to continue NWB status. Urinary tract infection due to [facility]. Completed 5 days of IV rocephin. Hypothyroidism on synthroid    GERD, pantoprazole. Constipation - add dulcolax 10mg x1. Switch miralax to daily. Cont Senokot-S       Diet: DIET GENERAL;  Code Status: Full Code        Dispo - awaiting placement.      Tae Hilton MD

## 2018-10-22 NOTE — PROGRESS NOTES
Pt is in resting in bed. Fall precautions are in place. Call light is within reach. Pt complains of no pain or nausea. Assessment complete. No further needs.    Electronically signed by Kofi Ayon RN on 10/22/2018 at 12:46 PM

## 2018-10-29 ENCOUNTER — TELEPHONE (OUTPATIENT)
Dept: ORTHOPEDIC SURGERY | Age: 83
End: 2018-10-29

## 2018-10-29 ENCOUNTER — OFFICE VISIT (OUTPATIENT)
Dept: ORTHOPEDIC SURGERY | Age: 83
End: 2018-10-29
Payer: COMMERCIAL

## 2018-10-29 VITALS
DIASTOLIC BLOOD PRESSURE: 74 MMHG | HEART RATE: 87 BPM | HEIGHT: 62 IN | BODY MASS INDEX: 31.16 KG/M2 | SYSTOLIC BLOOD PRESSURE: 130 MMHG | WEIGHT: 169.31 LBS

## 2018-10-29 DIAGNOSIS — S82.842A BIMALLEOLAR ANKLE FRACTURE, LEFT, CLOSED, INITIAL ENCOUNTER: ICD-10-CM

## 2018-10-29 DIAGNOSIS — M25.572 ACUTE LEFT ANKLE PAIN: Primary | ICD-10-CM

## 2018-10-29 PROCEDURE — L4361 PNEUMA/VAC WALK BOOT PRE OTS: HCPCS | Performed by: PODIATRIST

## 2018-10-29 PROCEDURE — 99214 OFFICE O/P EST MOD 30 MIN: CPT | Performed by: PODIATRIST

## 2018-11-19 ENCOUNTER — OFFICE VISIT (OUTPATIENT)
Dept: ORTHOPEDIC SURGERY | Age: 83
End: 2018-11-19
Payer: COMMERCIAL

## 2018-11-19 VITALS
BODY MASS INDEX: 31.16 KG/M2 | WEIGHT: 169.31 LBS | HEIGHT: 62 IN | HEART RATE: 77 BPM | DIASTOLIC BLOOD PRESSURE: 78 MMHG | SYSTOLIC BLOOD PRESSURE: 123 MMHG

## 2018-11-19 DIAGNOSIS — S82.841A CLOSED BIMALLEOLAR FRACTURE OF RIGHT ANKLE, INITIAL ENCOUNTER: Primary | ICD-10-CM

## 2018-11-19 PROCEDURE — 99213 OFFICE O/P EST LOW 20 MIN: CPT | Performed by: PODIATRIST

## 2018-11-19 RX ORDER — INFLUENZA A VIRUS A/MICHIGAN/45/2015 X-275 (H1N1) ANTIGEN (FORMALDEHYDE INACTIVATED), INFLUENZA A VIRUS A/SINGAPORE/INFIMH-16-0019/2016 IVR-186 (H3N2) ANTIGEN (FORMALDEHYDE INACTIVATED), INFLUENZA B VIRUS B/PHUKET/3073/2013 ANTIGEN (FORMALDEHYDE INACTIVATED), AND INFLUENZA B VIRUS B/MARYLAND/15/2016 BX-69A ANTIGEN (FORMALDEHYDE INACTIVATED) 15; 15; 15; 15 UG/.5ML; UG/.5ML; UG/.5ML; UG/.5ML
INJECTION, SUSPENSION INTRAMUSCULAR
Refills: 0 | Status: ON HOLD | COMMUNITY
Start: 2018-10-30 | End: 2020-08-14 | Stop reason: HOSPADM

## 2018-12-20 ENCOUNTER — OFFICE VISIT (OUTPATIENT)
Dept: ORTHOPEDIC SURGERY | Age: 83
End: 2018-12-20
Payer: COMMERCIAL

## 2018-12-20 VITALS
SYSTOLIC BLOOD PRESSURE: 124 MMHG | BODY MASS INDEX: 31.16 KG/M2 | WEIGHT: 169.31 LBS | HEIGHT: 62 IN | HEART RATE: 87 BPM | DIASTOLIC BLOOD PRESSURE: 74 MMHG

## 2018-12-20 DIAGNOSIS — S82.841D CLOSED BIMALLEOLAR FRACTURE OF RIGHT ANKLE WITH ROUTINE HEALING, SUBSEQUENT ENCOUNTER: ICD-10-CM

## 2018-12-20 DIAGNOSIS — M25.572 ACUTE LEFT ANKLE PAIN: Primary | ICD-10-CM

## 2018-12-20 PROCEDURE — 99213 OFFICE O/P EST LOW 20 MIN: CPT | Performed by: PODIATRIST

## 2018-12-20 PROCEDURE — L1902 AFO ANKLE GAUNTLET PRE OTS: HCPCS | Performed by: PODIATRIST

## 2019-01-28 ENCOUNTER — OFFICE VISIT (OUTPATIENT)
Dept: ORTHOPEDIC SURGERY | Age: 84
End: 2019-01-28
Payer: COMMERCIAL

## 2019-01-28 VITALS
HEART RATE: 80 BPM | SYSTOLIC BLOOD PRESSURE: 118 MMHG | WEIGHT: 169.31 LBS | BODY MASS INDEX: 31.16 KG/M2 | HEIGHT: 62 IN | DIASTOLIC BLOOD PRESSURE: 68 MMHG

## 2019-01-28 DIAGNOSIS — S82.842A BIMALLEOLAR ANKLE FRACTURE, LEFT, CLOSED, INITIAL ENCOUNTER: Primary | ICD-10-CM

## 2019-01-28 PROCEDURE — 99212 OFFICE O/P EST SF 10 MIN: CPT | Performed by: PODIATRIST

## 2020-08-03 ENCOUNTER — APPOINTMENT (OUTPATIENT)
Dept: MRI IMAGING | Age: 85
DRG: 668 | End: 2020-08-03
Attending: INTERNAL MEDICINE
Payer: COMMERCIAL

## 2020-08-03 ENCOUNTER — APPOINTMENT (OUTPATIENT)
Dept: GENERAL RADIOLOGY | Age: 85
End: 2020-08-03
Payer: COMMERCIAL

## 2020-08-03 ENCOUNTER — APPOINTMENT (OUTPATIENT)
Dept: CT IMAGING | Age: 85
DRG: 668 | End: 2020-08-03
Attending: INTERNAL MEDICINE
Payer: COMMERCIAL

## 2020-08-03 ENCOUNTER — APPOINTMENT (OUTPATIENT)
Dept: CT IMAGING | Age: 85
End: 2020-08-03
Payer: COMMERCIAL

## 2020-08-03 ENCOUNTER — APPOINTMENT (OUTPATIENT)
Dept: ULTRASOUND IMAGING | Age: 85
DRG: 668 | End: 2020-08-03
Attending: INTERNAL MEDICINE
Payer: COMMERCIAL

## 2020-08-03 ENCOUNTER — HOSPITAL ENCOUNTER (EMERGENCY)
Age: 85
Discharge: ANOTHER HOSPITAL | End: 2020-08-03
Attending: EMERGENCY MEDICINE
Payer: COMMERCIAL

## 2020-08-03 ENCOUNTER — HOSPITAL ENCOUNTER (INPATIENT)
Age: 85
LOS: 11 days | Discharge: NURSING, REHAB OR LONG-TERM CARE | DRG: 668 | End: 2020-08-14
Attending: INTERNAL MEDICINE | Admitting: INTERNAL MEDICINE
Payer: COMMERCIAL

## 2020-08-03 VITALS
SYSTOLIC BLOOD PRESSURE: 110 MMHG | TEMPERATURE: 97.5 F | HEART RATE: 75 BPM | HEIGHT: 62 IN | DIASTOLIC BLOOD PRESSURE: 53 MMHG | BODY MASS INDEX: 28.52 KG/M2 | WEIGHT: 155 LBS | RESPIRATION RATE: 17 BRPM | OXYGEN SATURATION: 94 %

## 2020-08-03 DIAGNOSIS — G93.41 ACUTE METABOLIC ENCEPHALOPATHY: Primary | ICD-10-CM

## 2020-08-03 DIAGNOSIS — R31.9 URINARY TRACT INFECTION WITH HEMATURIA, SITE UNSPECIFIED: ICD-10-CM

## 2020-08-03 DIAGNOSIS — N17.9 ACUTE RENAL FAILURE, UNSPECIFIED ACUTE RENAL FAILURE TYPE (HCC): ICD-10-CM

## 2020-08-03 DIAGNOSIS — S02.113A UNSPECIFIED OCCIPITAL CONDYLE FRACTURE, INITIAL ENCOUNTER FOR CLOSED FRACTURE (HCC): ICD-10-CM

## 2020-08-03 DIAGNOSIS — S12.040A CLOSED DISPLACED LATERAL MASS FRACTURE OF FIRST CERVICAL VERTEBRA, INITIAL ENCOUNTER (HCC): ICD-10-CM

## 2020-08-03 DIAGNOSIS — N39.0 URINARY TRACT INFECTION WITH HEMATURIA, SITE UNSPECIFIED: ICD-10-CM

## 2020-08-03 LAB
A/G RATIO: 1.2 (ref 1.1–2.2)
A/G RATIO: 1.2 (ref 1.1–2.2)
ALBUMIN SERPL-MCNC: 3.5 G/DL (ref 3.4–5)
ALBUMIN SERPL-MCNC: 3.7 G/DL (ref 3.4–5)
ALP BLD-CCNC: 61 U/L (ref 40–129)
ALP BLD-CCNC: 65 U/L (ref 40–129)
ALT SERPL-CCNC: 8 U/L (ref 10–40)
ALT SERPL-CCNC: 9 U/L (ref 10–40)
ANION GAP SERPL CALCULATED.3IONS-SCNC: 14 MMOL/L (ref 3–16)
ANION GAP SERPL CALCULATED.3IONS-SCNC: 18 MMOL/L (ref 3–16)
ANION GAP SERPL CALCULATED.3IONS-SCNC: 18 MMOL/L (ref 3–16)
AST SERPL-CCNC: 21 U/L (ref 15–37)
AST SERPL-CCNC: 23 U/L (ref 15–37)
BASOPHILS ABSOLUTE: 0 K/UL (ref 0–0.2)
BASOPHILS ABSOLUTE: 0.1 K/UL (ref 0–0.2)
BASOPHILS RELATIVE PERCENT: 0.9 %
BASOPHILS RELATIVE PERCENT: 1.2 %
BILIRUB SERPL-MCNC: 0.4 MG/DL (ref 0–1)
BILIRUB SERPL-MCNC: 0.4 MG/DL (ref 0–1)
BILIRUBIN URINE: NEGATIVE
BLOOD, URINE: ABNORMAL
BUN BLDV-MCNC: 76 MG/DL (ref 7–20)
BUN BLDV-MCNC: 78 MG/DL (ref 7–20)
BUN BLDV-MCNC: 78 MG/DL (ref 7–20)
CALCIUM SERPL-MCNC: 9 MG/DL (ref 8.3–10.6)
CALCIUM SERPL-MCNC: 9.2 MG/DL (ref 8.3–10.6)
CALCIUM SERPL-MCNC: 9.2 MG/DL (ref 8.3–10.6)
CHLORIDE BLD-SCNC: 102 MMOL/L (ref 99–110)
CHLORIDE BLD-SCNC: 103 MMOL/L (ref 99–110)
CHLORIDE BLD-SCNC: 106 MMOL/L (ref 99–110)
CLARITY: ABNORMAL
CO2: 11 MMOL/L (ref 21–32)
CO2: 12 MMOL/L (ref 21–32)
CO2: 14 MMOL/L (ref 21–32)
COLOR: YELLOW
COMMENT UA: ABNORMAL
CREAT SERPL-MCNC: 4.6 MG/DL (ref 0.6–1.2)
CREAT SERPL-MCNC: 5.2 MG/DL (ref 0.6–1.2)
CREAT SERPL-MCNC: 5.3 MG/DL (ref 0.6–1.2)
CREATININE URINE: 74.4 MG/DL (ref 28–259)
EKG ATRIAL RATE: 88 BPM
EKG DIAGNOSIS: NORMAL
EKG P AXIS: 33 DEGREES
EKG P-R INTERVAL: 176 MS
EKG Q-T INTERVAL: 394 MS
EKG QRS DURATION: 92 MS
EKG QTC CALCULATION (BAZETT): 476 MS
EKG R AXIS: -61 DEGREES
EKG T AXIS: 35 DEGREES
EKG VENTRICULAR RATE: 88 BPM
EOSINOPHILS ABSOLUTE: 0 K/UL (ref 0–0.6)
EOSINOPHILS ABSOLUTE: 0.1 K/UL (ref 0–0.6)
EOSINOPHILS RELATIVE PERCENT: 0.5 %
EOSINOPHILS RELATIVE PERCENT: 0.9 %
EPITHELIAL CELLS, UA: 0 /HPF (ref 0–5)
GFR AFRICAN AMERICAN: 11
GFR AFRICAN AMERICAN: 9
GFR AFRICAN AMERICAN: 9
GFR NON-AFRICAN AMERICAN: 7
GFR NON-AFRICAN AMERICAN: 8
GFR NON-AFRICAN AMERICAN: 9
GLOBULIN: 2.9 G/DL
GLOBULIN: 3.2 G/DL
GLUCOSE BLD-MCNC: 116 MG/DL (ref 70–99)
GLUCOSE BLD-MCNC: 121 MG/DL (ref 70–99)
GLUCOSE BLD-MCNC: 125 MG/DL (ref 70–99)
GLUCOSE BLD-MCNC: 129 MG/DL (ref 70–99)
GLUCOSE BLD-MCNC: 131 MG/DL (ref 70–99)
GLUCOSE BLD-MCNC: 165 MG/DL (ref 70–99)
GLUCOSE URINE: NEGATIVE MG/DL
HCT VFR BLD CALC: 32.1 % (ref 36–48)
HCT VFR BLD CALC: 36.1 % (ref 36–48)
HEMOGLOBIN: 10.6 G/DL (ref 12–16)
HEMOGLOBIN: 11.8 G/DL (ref 12–16)
HYALINE CASTS: 0 /LPF (ref 0–8)
INR BLD: 1.03 (ref 0.86–1.14)
KETONES, URINE: ABNORMAL MG/DL
LEUKOCYTE ESTERASE, URINE: ABNORMAL
LYMPHOCYTES ABSOLUTE: 0.4 K/UL (ref 1–5.1)
LYMPHOCYTES ABSOLUTE: 0.5 K/UL (ref 1–5.1)
LYMPHOCYTES RELATIVE PERCENT: 7.4 %
LYMPHOCYTES RELATIVE PERCENT: 9.7 %
MCH RBC QN AUTO: 28 PG (ref 26–34)
MCH RBC QN AUTO: 28.5 PG (ref 26–34)
MCHC RBC AUTO-ENTMCNC: 32.7 G/DL (ref 31–36)
MCHC RBC AUTO-ENTMCNC: 33 G/DL (ref 31–36)
MCV RBC AUTO: 85.8 FL (ref 80–100)
MCV RBC AUTO: 86.4 FL (ref 80–100)
MICROSCOPIC EXAMINATION: YES
MONOCYTES ABSOLUTE: 0.4 K/UL (ref 0–1.3)
MONOCYTES ABSOLUTE: 0.5 K/UL (ref 0–1.3)
MONOCYTES RELATIVE PERCENT: 10 %
MONOCYTES RELATIVE PERCENT: 7.7 %
NEUTROPHILS ABSOLUTE: 3.3 K/UL (ref 1.7–7.7)
NEUTROPHILS ABSOLUTE: 5.7 K/UL (ref 1.7–7.7)
NEUTROPHILS RELATIVE PERCENT: 78.9 %
NEUTROPHILS RELATIVE PERCENT: 82.8 %
NITRITE, URINE: NEGATIVE
PDW BLD-RTO: 16.9 % (ref 12.4–15.4)
PDW BLD-RTO: 17 % (ref 12.4–15.4)
PERFORMED ON: ABNORMAL
PH UA: 5.5 (ref 5–8)
PLATELET # BLD: 137 K/UL (ref 135–450)
PLATELET # BLD: 173 K/UL (ref 135–450)
PMV BLD AUTO: 8.1 FL (ref 5–10.5)
PMV BLD AUTO: 8.3 FL (ref 5–10.5)
POTASSIUM REFLEX MAGNESIUM: 5.1 MMOL/L (ref 3.5–5.1)
POTASSIUM SERPL-SCNC: 6.1 MMOL/L (ref 3.5–5.1)
POTASSIUM SERPL-SCNC: 6.4 MMOL/L (ref 3.5–5.1)
PROTEIN PROTEIN: 435 MG/DL
PROTEIN UA: 100 MG/DL
PROTHROMBIN TIME: 12 SEC (ref 10–13.2)
RBC # BLD: 3.72 M/UL (ref 4–5.2)
RBC # BLD: 4.21 M/UL (ref 4–5.2)
RBC UA: 77 /HPF (ref 0–4)
SARS-COV-2, PCR: DETECTED
SODIUM BLD-SCNC: 131 MMOL/L (ref 136–145)
SODIUM BLD-SCNC: 133 MMOL/L (ref 136–145)
SODIUM BLD-SCNC: 134 MMOL/L (ref 136–145)
SODIUM URINE: 87 MMOL/L
SPECIFIC GRAVITY UA: 1.01 (ref 1–1.03)
TOTAL CK: 182 U/L (ref 26–192)
TOTAL PROTEIN: 6.4 G/DL (ref 6.4–8.2)
TOTAL PROTEIN: 6.9 G/DL (ref 6.4–8.2)
TROPONIN: 0.22 NG/ML
TROPONIN: 0.29 NG/ML
TROPONIN: 0.36 NG/ML
URIC ACID, SERUM: 9.5 MG/DL (ref 2.6–6)
URINE REFLEX TO CULTURE: YES
URINE TYPE: ABNORMAL
UROBILINOGEN, URINE: 0.2 E.U./DL
WBC # BLD: 4.2 K/UL (ref 4–11)
WBC # BLD: 6.8 K/UL (ref 4–11)
WBC UA: 335 /HPF (ref 0–5)

## 2020-08-03 PROCEDURE — 70450 CT HEAD/BRAIN W/O DYE: CPT

## 2020-08-03 PROCEDURE — 96361 HYDRATE IV INFUSION ADD-ON: CPT

## 2020-08-03 PROCEDURE — U0003 INFECTIOUS AGENT DETECTION BY NUCLEIC ACID (DNA OR RNA); SEVERE ACUTE RESPIRATORY SYNDROME CORONAVIRUS 2 (SARS-COV-2) (CORONAVIRUS DISEASE [COVID-19]), AMPLIFIED PROBE TECHNIQUE, MAKING USE OF HIGH THROUGHPUT TECHNOLOGIES AS DESCRIBED BY CMS-2020-01-R: HCPCS

## 2020-08-03 PROCEDURE — 6360000002 HC RX W HCPCS: Performed by: INTERNAL MEDICINE

## 2020-08-03 PROCEDURE — 82570 ASSAY OF URINE CREATININE: CPT

## 2020-08-03 PROCEDURE — 6360000002 HC RX W HCPCS: Performed by: PHYSICIAN ASSISTANT

## 2020-08-03 PROCEDURE — 84484 ASSAY OF TROPONIN QUANT: CPT

## 2020-08-03 PROCEDURE — 85610 PROTHROMBIN TIME: CPT

## 2020-08-03 PROCEDURE — 93010 ELECTROCARDIOGRAM REPORT: CPT | Performed by: INTERNAL MEDICINE

## 2020-08-03 PROCEDURE — 84300 ASSAY OF URINE SODIUM: CPT

## 2020-08-03 PROCEDURE — 2580000003 HC RX 258: Performed by: INTERNAL MEDICINE

## 2020-08-03 PROCEDURE — 96374 THER/PROPH/DIAG INJ IV PUSH: CPT

## 2020-08-03 PROCEDURE — 6360000002 HC RX W HCPCS: Performed by: EMERGENCY MEDICINE

## 2020-08-03 PROCEDURE — 84550 ASSAY OF BLOOD/URIC ACID: CPT

## 2020-08-03 PROCEDURE — 93005 ELECTROCARDIOGRAM TRACING: CPT | Performed by: PHYSICIAN ASSISTANT

## 2020-08-03 PROCEDURE — 76770 US EXAM ABDO BACK WALL COMP: CPT

## 2020-08-03 PROCEDURE — 70551 MRI BRAIN STEM W/O DYE: CPT

## 2020-08-03 PROCEDURE — 2580000003 HC RX 258: Performed by: PHYSICIAN ASSISTANT

## 2020-08-03 PROCEDURE — 80053 COMPREHEN METABOLIC PANEL: CPT

## 2020-08-03 PROCEDURE — 87186 SC STD MICRODIL/AGAR DIL: CPT

## 2020-08-03 PROCEDURE — 2500000003 HC RX 250 WO HCPCS: Performed by: INTERNAL MEDICINE

## 2020-08-03 PROCEDURE — 51702 INSERT TEMP BLADDER CATH: CPT

## 2020-08-03 PROCEDURE — 73502 X-RAY EXAM HIP UNI 2-3 VIEWS: CPT

## 2020-08-03 PROCEDURE — 74176 CT ABD & PELVIS W/O CONTRAST: CPT

## 2020-08-03 PROCEDURE — 87077 CULTURE AEROBIC IDENTIFY: CPT

## 2020-08-03 PROCEDURE — 73560 X-RAY EXAM OF KNEE 1 OR 2: CPT

## 2020-08-03 PROCEDURE — 6370000000 HC RX 637 (ALT 250 FOR IP): Performed by: PHYSICIAN ASSISTANT

## 2020-08-03 PROCEDURE — 85025 COMPLETE CBC W/AUTO DIFF WBC: CPT

## 2020-08-03 PROCEDURE — 99285 EMERGENCY DEPT VISIT HI MDM: CPT

## 2020-08-03 PROCEDURE — 82550 ASSAY OF CK (CPK): CPT

## 2020-08-03 PROCEDURE — 72125 CT NECK SPINE W/O DYE: CPT

## 2020-08-03 PROCEDURE — 96375 TX/PRO/DX INJ NEW DRUG ADDON: CPT

## 2020-08-03 PROCEDURE — 87086 URINE CULTURE/COLONY COUNT: CPT

## 2020-08-03 PROCEDURE — 2060000000 HC ICU INTERMEDIATE R&B

## 2020-08-03 PROCEDURE — 81001 URINALYSIS AUTO W/SCOPE: CPT

## 2020-08-03 PROCEDURE — 71045 X-RAY EXAM CHEST 1 VIEW: CPT

## 2020-08-03 PROCEDURE — 70547 MR ANGIOGRAPHY NECK W/O DYE: CPT

## 2020-08-03 PROCEDURE — 84156 ASSAY OF PROTEIN URINE: CPT

## 2020-08-03 RX ORDER — POTASSIUM CHLORIDE 7.45 MG/ML
10 INJECTION INTRAVENOUS PRN
Status: DISCONTINUED | OUTPATIENT
Start: 2020-08-03 | End: 2020-08-03

## 2020-08-03 RX ORDER — SODIUM CHLORIDE 0.9 % (FLUSH) 0.9 %
10 SYRINGE (ML) INJECTION EVERY 12 HOURS SCHEDULED
Status: DISCONTINUED | OUTPATIENT
Start: 2020-08-03 | End: 2020-08-13

## 2020-08-03 RX ORDER — PROMETHAZINE HYDROCHLORIDE 25 MG/1
12.5 TABLET ORAL EVERY 6 HOURS PRN
Status: DISCONTINUED | OUTPATIENT
Start: 2020-08-03 | End: 2020-08-14 | Stop reason: HOSPADM

## 2020-08-03 RX ORDER — CALCIUM GLUCONATE 94 MG/ML
1 INJECTION, SOLUTION INTRAVENOUS ONCE
Status: COMPLETED | OUTPATIENT
Start: 2020-08-03 | End: 2020-08-03

## 2020-08-03 RX ORDER — LEVOTHYROXINE SODIUM 0.12 MG/1
125 TABLET ORAL DAILY
Status: DISCONTINUED | OUTPATIENT
Start: 2020-08-03 | End: 2020-08-13

## 2020-08-03 RX ORDER — ACETAMINOPHEN 650 MG/1
650 SUPPOSITORY RECTAL EVERY 6 HOURS PRN
Status: DISCONTINUED | OUTPATIENT
Start: 2020-08-03 | End: 2020-08-07 | Stop reason: SDUPTHER

## 2020-08-03 RX ORDER — ACETAMINOPHEN 325 MG/1
650 TABLET ORAL EVERY 6 HOURS PRN
Status: DISCONTINUED | OUTPATIENT
Start: 2020-08-03 | End: 2020-08-07 | Stop reason: SDUPTHER

## 2020-08-03 RX ORDER — POLYETHYLENE GLYCOL 3350 17 G/17G
17 POWDER, FOR SOLUTION ORAL DAILY PRN
Status: DISCONTINUED | OUTPATIENT
Start: 2020-08-03 | End: 2020-08-13

## 2020-08-03 RX ORDER — POTASSIUM CHLORIDE 20 MEQ/1
40 TABLET, EXTENDED RELEASE ORAL PRN
Status: DISCONTINUED | OUTPATIENT
Start: 2020-08-03 | End: 2020-08-03

## 2020-08-03 RX ORDER — 0.9 % SODIUM CHLORIDE 0.9 %
1000 INTRAVENOUS SOLUTION INTRAVENOUS ONCE
Status: COMPLETED | OUTPATIENT
Start: 2020-08-03 | End: 2020-08-03

## 2020-08-03 RX ORDER — MAGNESIUM SULFATE IN WATER 40 MG/ML
2 INJECTION, SOLUTION INTRAVENOUS PRN
Status: DISCONTINUED | OUTPATIENT
Start: 2020-08-03 | End: 2020-08-13

## 2020-08-03 RX ORDER — ONDANSETRON 2 MG/ML
4 INJECTION INTRAMUSCULAR; INTRAVENOUS EVERY 6 HOURS PRN
Status: DISCONTINUED | OUTPATIENT
Start: 2020-08-03 | End: 2020-08-14 | Stop reason: HOSPADM

## 2020-08-03 RX ORDER — LORAZEPAM 0.5 MG/1
0.5 TABLET ORAL ONCE
Status: DISCONTINUED | OUTPATIENT
Start: 2020-08-03 | End: 2020-08-03

## 2020-08-03 RX ORDER — DEXTROSE MONOHYDRATE 25 G/50ML
12.5 INJECTION, SOLUTION INTRAVENOUS PRN
Status: DISCONTINUED | OUTPATIENT
Start: 2020-08-03 | End: 2020-08-14 | Stop reason: HOSPADM

## 2020-08-03 RX ORDER — INSULIN LISPRO 100 [IU]/ML
0-6 INJECTION, SOLUTION INTRAVENOUS; SUBCUTANEOUS
Status: DISCONTINUED | OUTPATIENT
Start: 2020-08-03 | End: 2020-08-13

## 2020-08-03 RX ORDER — NICOTINE POLACRILEX 4 MG
15 LOZENGE BUCCAL PRN
Status: DISCONTINUED | OUTPATIENT
Start: 2020-08-03 | End: 2020-08-13

## 2020-08-03 RX ORDER — DEXTROSE MONOHYDRATE 50 MG/ML
100 INJECTION, SOLUTION INTRAVENOUS PRN
Status: DISCONTINUED | OUTPATIENT
Start: 2020-08-03 | End: 2020-08-14 | Stop reason: HOSPADM

## 2020-08-03 RX ORDER — SODIUM CHLORIDE 9 MG/ML
INJECTION, SOLUTION INTRAVENOUS CONTINUOUS
Status: DISCONTINUED | OUTPATIENT
Start: 2020-08-03 | End: 2020-08-03

## 2020-08-03 RX ORDER — DEXTROSE MONOHYDRATE 25 G/50ML
12.5 INJECTION, SOLUTION INTRAVENOUS PRN
Status: DISCONTINUED | OUTPATIENT
Start: 2020-08-03 | End: 2020-08-03 | Stop reason: HOSPADM

## 2020-08-03 RX ORDER — INSULIN LISPRO 100 [IU]/ML
0-3 INJECTION, SOLUTION INTRAVENOUS; SUBCUTANEOUS NIGHTLY
Status: DISCONTINUED | OUTPATIENT
Start: 2020-08-03 | End: 2020-08-13

## 2020-08-03 RX ORDER — ACETAMINOPHEN 500 MG
1000 TABLET ORAL ONCE
Status: COMPLETED | OUTPATIENT
Start: 2020-08-03 | End: 2020-08-03

## 2020-08-03 RX ORDER — DEXTROSE MONOHYDRATE 25 G/50ML
25 INJECTION, SOLUTION INTRAVENOUS ONCE
Status: COMPLETED | OUTPATIENT
Start: 2020-08-03 | End: 2020-08-03

## 2020-08-03 RX ORDER — LORAZEPAM 2 MG/ML
0.5 INJECTION INTRAMUSCULAR ONCE
Status: COMPLETED | OUTPATIENT
Start: 2020-08-03 | End: 2020-08-03

## 2020-08-03 RX ORDER — DEXTROSE MONOHYDRATE 50 MG/ML
100 INJECTION, SOLUTION INTRAVENOUS PRN
Status: DISCONTINUED | OUTPATIENT
Start: 2020-08-03 | End: 2020-08-03 | Stop reason: HOSPADM

## 2020-08-03 RX ORDER — NICOTINE POLACRILEX 4 MG
15 LOZENGE BUCCAL PRN
Status: DISCONTINUED | OUTPATIENT
Start: 2020-08-03 | End: 2020-08-03 | Stop reason: HOSPADM

## 2020-08-03 RX ORDER — SODIUM CHLORIDE 0.9 % (FLUSH) 0.9 %
10 SYRINGE (ML) INJECTION PRN
Status: DISCONTINUED | OUTPATIENT
Start: 2020-08-03 | End: 2020-08-14 | Stop reason: HOSPADM

## 2020-08-03 RX ORDER — MORPHINE SULFATE 2 MG/ML
2 INJECTION, SOLUTION INTRAMUSCULAR; INTRAVENOUS EVERY 4 HOURS PRN
Status: DISPENSED | OUTPATIENT
Start: 2020-08-03 | End: 2020-08-05

## 2020-08-03 RX ADMIN — Medication 10 ML: at 22:24

## 2020-08-03 RX ADMIN — CEFTRIAXONE 1 G: 1 INJECTION, POWDER, FOR SOLUTION INTRAMUSCULAR; INTRAVENOUS at 10:31

## 2020-08-03 RX ADMIN — Medication 10 ML: at 20:59

## 2020-08-03 RX ADMIN — SODIUM BICARBONATE: 84 INJECTION, SOLUTION INTRAVENOUS at 10:31

## 2020-08-03 RX ADMIN — ACETAMINOPHEN 1000 MG: 500 TABLET, FILM COATED ORAL at 03:31

## 2020-08-03 RX ADMIN — DEXTROSE MONOHYDRATE 25 G: 25 INJECTION, SOLUTION INTRAVENOUS at 05:14

## 2020-08-03 RX ADMIN — MORPHINE SULFATE 2 MG: 2 INJECTION, SOLUTION INTRAMUSCULAR; INTRAVENOUS at 18:00

## 2020-08-03 RX ADMIN — MORPHINE SULFATE 2 MG: 2 INJECTION, SOLUTION INTRAMUSCULAR; INTRAVENOUS at 22:23

## 2020-08-03 RX ADMIN — SODIUM CHLORIDE 1000 ML: 9 INJECTION, SOLUTION INTRAVENOUS at 05:16

## 2020-08-03 RX ADMIN — DEXTROSE 50 % IN WATER (D50W) INTRAVENOUS SYRINGE 12.5 G: at 09:30

## 2020-08-03 RX ADMIN — INSULIN HUMAN 10 UNITS: 100 INJECTION, SOLUTION PARENTERAL at 05:14

## 2020-08-03 RX ADMIN — SODIUM BICARBONATE: 84 INJECTION, SOLUTION INTRAVENOUS at 12:00

## 2020-08-03 RX ADMIN — CALCIUM GLUCONATE 1 G: 98 INJECTION, SOLUTION INTRAVENOUS at 05:13

## 2020-08-03 RX ADMIN — Medication 10 ML: at 10:36

## 2020-08-03 RX ADMIN — LORAZEPAM 0.5 MG: 2 INJECTION INTRAMUSCULAR; INTRAVENOUS at 06:53

## 2020-08-03 ASSESSMENT — PAIN SCALES - PAIN ASSESSMENT IN ADVANCED DEMENTIA (PAINAD)
BODYLANGUAGE: 0
FACIALEXPRESSION: 0
BODYLANGUAGE: 1
BREATHING: 0
BREATHING: 1
BODYLANGUAGE: 0
NEGVOCALIZATION: 2
NEGVOCALIZATION: 0
TOTALSCORE: 1
FACIALEXPRESSION: 0
BODYLANGUAGE: 0
FACIALEXPRESSION: 0
TOTALSCORE: 9
BREATHING: 0
NEGVOCALIZATION: 1
CONSOLABILITY: 0
BODYLANGUAGE: 2
TOTALSCORE: 0
CONSOLABILITY: 0
CONSOLABILITY: 2
NEGVOCALIZATION: 0
BREATHING: 0
BODYLANGUAGE: 0
TOTALSCORE: 0
FACIALEXPRESSION: 0
FACIALEXPRESSION: 2
TOTALSCORE: 0

## 2020-08-03 ASSESSMENT — ENCOUNTER SYMPTOMS
EYE REDNESS: 0
CHOKING: 0
APNEA: 0
VOMITING: 0
EYE DISCHARGE: 0
NAUSEA: 0
BACK PAIN: 0
SHORTNESS OF BREATH: 0
FACIAL SWELLING: 0
SORE THROAT: 0
ABDOMINAL PAIN: 0

## 2020-08-03 ASSESSMENT — PAIN SCALES - GENERAL
PAINLEVEL_OUTOF10: 0
PAINLEVEL_OUTOF10: 0
PAINLEVEL_OUTOF10: 7
PAINLEVEL_OUTOF10: 10
PAINLEVEL_OUTOF10: 0

## 2020-08-03 ASSESSMENT — PAIN - FUNCTIONAL ASSESSMENT: PAIN_FUNCTIONAL_ASSESSMENT: PREVENTS OR INTERFERES WITH MANY ACTIVE NOT PASSIVE ACTIVITIES

## 2020-08-03 ASSESSMENT — PAIN DESCRIPTION - FREQUENCY: FREQUENCY: INTERMITTENT

## 2020-08-03 ASSESSMENT — PAIN DESCRIPTION - DESCRIPTORS: DESCRIPTORS: SORE

## 2020-08-03 ASSESSMENT — PAIN DESCRIPTION - ORIENTATION: ORIENTATION: MID

## 2020-08-03 ASSESSMENT — PAIN DESCRIPTION - LOCATION: LOCATION: NECK;HEAD

## 2020-08-03 ASSESSMENT — PAIN DESCRIPTION - PAIN TYPE: TYPE: ACUTE PAIN

## 2020-08-03 ASSESSMENT — PAIN DESCRIPTION - PROGRESSION: CLINICAL_PROGRESSION: NOT CHANGED

## 2020-08-03 ASSESSMENT — PAIN DESCRIPTION - ONSET: ONSET: ON-GOING

## 2020-08-03 NOTE — PROGRESS NOTES
4 Eyes Admission Assessment     I agree as the admission nurse that 2 RN's have performed a thorough Head to Toe Skin Assessment on the patient. ALL assessment sites listed below have been assessed on admission. Areas assessed by both nurses:   [x]   Head, Face, and Ears   [x]   Shoulders, Back, and Chest  [x]   Arms, Elbows, and Hands   [x]   Coccyx, Sacrum, and Ischum  [x]   Legs, Feet, and Heels      Right shoulder large bruise, blister right great toe, abrasion left knee, left elbow healing skin tear. Red heels bilateral, and reddened blanchable buttocks. Scattered bruising    Does the Patient have Skin Breakdown?   Yes a wound was noted on the Admission Assessment and an LDA was Initiated documentation include the Zaira-wound, Wound Assessment, Measurements, Dressing Treatment, Drainage, and Color\",         Jonathan Prevention initiated:  Yes   Wound Care Orders initiated:  No      C nurse consulted for Pressure Injury (Stage 3,4, Unstageable, DTI, NWPT, and Complex wounds):  No      Nurse 1 eSignature: Electronically signed by Tony Romero RN on 8/3/20 at 2:08 PM EDT    **SHARE this note so that the co-signing nurse is able to place an eSignature**    Nurse 2 eSignature: Electronically signed by Mcneil Mcburney on 8/4/20 at 7:21 AM EDT

## 2020-08-03 NOTE — ED NOTES
Bed: [facility]  Expected date:   Expected time:   Means of arrival: [employer] [facility]  Comments:  Hip fx     Blair Wells RN  08/03/20 0609

## 2020-08-03 NOTE — H&P
Procedure Laterality Date    COLECTOMY      partial    COLONOSCOPY  4/09    OK - no further eval    HYSTERECTOMY         Medications Prior to Admission:      Prior to Admission medications    Medication Sig Start Date End Date Taking? Authorizing Provider   FLUZONE QUADRIVALENT 0.5 ML injection TO BE ADMINISTERED BY PHARMACIST FOR IMMUNIZATION 10/30/18   Historical Provider, MD   triamcinolone (KENALOG) 0.1 % cream Apply topically 2 times daily.  10/19/18   Saroj Lam MD   potassium chloride (KLOR-CON M) 20 MEQ extended release tablet Take 0.5 tablets by mouth daily 10/19/18   Saroj Lam MD   latanoprost (XALATAN) 0.005 % ophthalmic solution Place 1 drop into both eyes nightly    Historical Provider, MD   lisinopril (PRINIVIL;ZESTRIL) 2.5 MG tablet Take 2.5 mg by mouth daily    Historical Provider, MD   metoprolol tartrate (LOPRESSOR) 25 MG tablet Take 25 mg by mouth 2 times daily    Historical Provider, MD   calcium elemental (OYSCO 500) 500 MG TABS tablet Take 500 mg by mouth 2 times daily     Historical Provider, MD   solifenacin (VESICARE) 5 MG tablet Take 5 mg by mouth daily    Historical Provider, MD   vitamin D (CHOLECALCIFEROL) 1000 UNIT TABS tablet Take 2,000 Units by mouth daily    Historical Provider, MD   hydrALAZINE (APRESOLINE) 10 MG tablet Take 10 mg by mouth 2 times daily as needed    Historical Provider, MD   polyethylene glycol (GLYCOLAX) powder Take 17 g by mouth 2 times daily as needed    Historical Provider, MD   senna-docusate (PERICOLACE) 8.6-50 MG per tablet Take 1 tablet by mouth 2 times daily as needed for Constipation    Historical Provider, MD   loratadine (CLARITIN) 10 MG tablet Take 10 mg by mouth daily    Historical Provider, MD   aspirin 325 MG EC tablet Take 1 tablet by mouth daily 10/17/18 11/16/18  CELIA Sal - CNP   dorzolamide-timolol (COSOPT) 22.3-6.8 MG/ML ophthalmic solution Place 1 drop into both eyes every 12 hours    Historical Provider, MD   Flax OIL Take by mouth daily    Historical Provider, MD   acetaminophen (AMINOFEN) 325 MG tablet Take 2 tablets by mouth every 6 hours as needed for Pain  Patient taking differently: Take 650 mg by mouth 2 times daily  4/18/16   CELIA Mendiola CNP   ranitidine (ZANTAC) 150 MG tablet TAKE ONE TABLET BY MOUTH TWICE A DAY 1/26/16   Arturo Feng MD   levothyroxine (SYNTHROID) 125 MCG tablet TAKE ONE TABLET BY MOUTH DAILY 1/26/16   Arturo Feng MD   cyanocobalamin 1000 MCG/ML injection INJECT 1 ML ONCE EVERY MONTH 12/7/15   Arturo Feng MD   Compression Stockings MISC by Does not apply route 20-30 mmHg -- knee high 9/28/15   Arturo Feng MD   SYRINGE-NEEDLE, DISP, 3 ML (B-D 3CC LUER-ENEDINA SYR 25GX5/8\") 25G X 5/8\" 3 ML MISC 1 mL by Alternating Nares route every 30 days 8/14/15   Arturo Feng MD   Needles & Syringes MISC by Does not apply route. Historical Provider, MD       Allergies:  Bee venom    Social History:      The patient currently lives assisted living    TOBACCO:   reports that she has never smoked. She has never used smokeless tobacco.  ETOH:   reports no history of alcohol use. Family History:       Reviewed in detail and negative for DM, CAD, Cancer, CVA. No family history on file. REVIEW OF SYSTEMS:   Pertinent positives as noted in the HPI. All other systems reviewed and negative. PHYSICAL EXAM PERFORMED:    BP (!) 99/55   Pulse 78   Temp 95.6 °F (35.3 °C) (Rectal)   Resp 18   Ht 5' 2\" (1.575 m)   Wt 121 lb 0.5 oz (54.9 kg)   SpO2 97%   BMI 22.14 kg/m²     General appearance: Cervical collar present,  HEENT:  Normal cephalic, atraumatic without obvious deformity. Pupils equal, round, and reactive to light. Extra ocular muscles intact. Conjunctivae/corneas clear. Neck: Cervical collar present  Respiratory:  Normal respiratory effort. Clear to auscultation, bilaterally without Rales/Wheezes/Rhonchi.   Cardiovascular:  Regular rate and rhythm with normal S1/S2 without murmurs, rubs or gallops. Abdomen: Soft, non-tender, non-distended with normal bowel sounds. Musculoskeletal:  No clubbing, cyanosis or edema bilaterally. Full range of motion without deformity. Skin: Skin color, texture, turgor normal.  No rashes or lesions. Neurologic: Alert oriented to self. Hard of hearing. Moving all extremities with verbal commands. Psychiatric:  Alert and oriented,   Peripheral Pulses: +2 palpable, equal bilaterally       Labs:     Recent Labs     08/03/20  0226 08/03/20  1005   WBC 6.8 4.2   HGB 11.8* 10.6*   HCT 36.1 32.1*    137     Recent Labs     08/03/20  0226 08/03/20  0333 08/03/20  1005   * 133* 134*   K 6.4* 6.1* 5.1    103 106   CO2 11* 12* 14*   BUN 78* 78* 76*   CREATININE 5.3* 5.2* 4.6*   CALCIUM 9.2 9.2 9.0     Recent Labs     08/03/20  0226 08/03/20  1005   AST 21 23   ALT 9* 8*   BILITOT 0.4 0.4   ALKPHOS 65 61     Recent Labs     08/03/20  1005   INR 1.03     Recent Labs     08/03/20  0226 08/03/20  1005   CKTOTAL  --  182   TROPONINI 0.22*  --        Urinalysis:      Lab Results   Component Value Date    NITRU Negative 08/03/2020    WBCUA 335 08/03/2020    BACTERIA Rare 01/07/2020    RBCUA 77 08/03/2020    BLOODU LARGE 08/03/2020    SPECGRAV 1.013 08/03/2020    GLUCOSEU Negative 08/03/2020    GLUCOSEU NEGATIVE 05/17/2010       Radiology:     CXR: I have reviewed the CXR with the following interpretation: NA  EKG:  I have reviewed the EKG with the following interpretation: Normal sinus rhythm with PACs. Nonspecific ST changes    US RENAL COMPLETE    (Results Pending)   MRA NECK WO CONTRAST    (Results Pending)   MRI BRAIN WO CONTRAST    (Results Pending)       ASSESSMENT:    Active Hospital Problems    Diagnosis Date Noted    Acute metabolic encephalopathy [G28.67] 08/03/2020     #Acute metabolic encephalopathy  Multifactorial UTI, renal failure.     #JUANCARLOS likely prerenal  #Metabolic acidosis  #Hyperkalemia in setting of renal failure  Creatinine 5.3> 4.6  Discussed with nephrology. Continue with bicarb drip. Follow-up on renal ultrasound. #UTI  Started on IV Rocephin. Follow-up on urine cultures. #Hematuria suspected secondary to traumatic Cha insertion  Patient still having blood clots after Cha irrigation. Will have a urology evaluation. #Elevated troponin in setting of JUANCARLOS  Will monitor    #Fall not sure if mechanical or followed by presyncope  #Comminuted and displaced left occipital condyle and C1 left lateral mass fracture. Neurosurgery has been consulted recommending to get an MRI and MRA. J [place] collar. PLAN:        DVT Prophylaxis: SCD due to hematuria  Diet: DIET GENERAL;  Code Status: Limited    PT/OT Eval Status: consulted. Dispo - inpatient. This chart was likely completed using voice recognition technology and may contain unintended grammatical , phraseology,and/or punctuation errors         Jannette Farah MD    Thank you Sandy Ibanez MD for the opportunity to be involved in this patient's care. If you have any questions or concerns please feel free to contact me at [phone].

## 2020-08-03 NOTE — ED NOTES
RN and JUN Garcia at bedside updating pt and family on plan of care.      Homa Peters RN  08/03/20 0138

## 2020-08-03 NOTE — PROGRESS NOTES
Attempted to call KALIE Benitez, no answer, voicemail left. Liliane Muir, daughter called and updated on plan of care and MRI questionnaire completed.

## 2020-08-03 NOTE — PROGRESS NOTES
Newman catheter replaced with a 20 Croatian newman catheter per verbal order from Bennett Graham urology NP

## 2020-08-03 NOTE — ED NOTES
Report called to [facility]. Handoff of pts pain score and plan of care.       Sharyle Finland, RN  08/03/20 5637

## 2020-08-03 NOTE — PROGRESS NOTES
0920: Patients rectal temperature noted to be 95.6, blood sugar 58. Warming blanket applied, 12.5 grams 50% dextrose given IVP per protocol. 0944 blood sugar recheck 165. Bladder irrigated with 30 mL sterile water per MD request. Large clots drawn back out of bladder. Urology consult obtained. Urine remains red with clots.

## 2020-08-03 NOTE — PROGRESS NOTES
Speech Language Pathology  HOLD    Chart reviewed, d/w RN Gypsy Bonilla. Stated pt is obtunded and not appropriate for evaluation/PO at this time. Will attempt to see as pt available and schedule allows.     Thank you,    Adiel Finnegan) [place], [place], [address] [facility]; [id number]  Speech-Language Pathologist  Pager #: [phone]

## 2020-08-03 NOTE — ED TRIAGE NOTES
Ben Salgado is a 80 y.o. female was sent from assisted living by EMS for a fall and left hip pain. The patient was seen normal at 2200 when staff helped her put on her PJS and then at midnight they found her face down on the floor. The patient is complaining of left hip and knee pain along with back pain. The patient is very hard of hearing even with hearing aids. The faculty states that the patient is normally alert and oriented but they feel that she has been confused today. The patient uses a walker for exercise but hasn't been using it recently because she has felt unsteady and family states she has been in a wheel chair. The patients son states that she has been confused for a week. The patient has redness on bilateral hips, left knee, rt arm, and  rt forehead.

## 2020-08-03 NOTE — ED PROVIDER NOTES
[address] [facility] [place]      Pt Name: Danyelle Toscano  PARISH:[id number]  [place] 3/12/1922  Date of evaluation: 8/3/2020  Provider: Rad Roe PA-C     The patient was seen and evaluated by attending physician Dr. Girish Cm MD      Chief Complaint:    Chief Complaint   Patient presents with   Clarisse Bowles     the patient was found down in her room at assited living,     Hip Pain     left hip pain    Back Pain       Nursing Notes, Past Medical Hx, Past Surgical Hx, Social Hx, Allergies, and Family Hx were all reviewed and agreed with or any disagreements were addressed in the HPI.    HPI:  (Location, Duration, Timing, Severity, Quality, Assoc Sx, Context, Modifying factors)  This is a  80 y.o. female brought in from [facility] for fall. Patient was last seen at 10:00 by the assisted living nurse. She was sitting in a chair when they came back to see her at 12 midnight they found her on the floor. Complain of left hip and knee pain. Denies loss of consciousness. She is alert. Denies chest pain, no abdominal pain, she does complain of pain to the back of her head. Denies being on blood thinner. No numbness or tingling her feet or finger. She ambulates a little bit with a walker. She is mainly in a wheelchair. This is according to her son. Been a little confused more in the last week. No other complaints.       PastMedical/Surgical History:      Diagnosis Date    Carcinoma in situ of colon 1998    Diarrhea     Esophageal reflux     Other extrapyramidal disease and abnormal movement disorder     Pernicious anemia     S/P colonoscopy 4/09    OK - no further eval.    Unspecified cataract     Unspecified hypothyroidism     Unspecified pruritic disorder          Procedure Laterality Date    COLECTOMY      partial    COLONOSCOPY  4/09    OK - no further eval    HYSTERECTOMY         Medications:  Previous Medications    ACETAMINOPHEN (AMINOFEN) 325 MG TABLET    Take 2 tablets by mouth every 6 hours as needed for Pain    ASPIRIN 325 MG EC TABLET    Take 1 tablet by mouth daily    CALCIUM ELEMENTAL (OYSCO 500) 500 MG TABS TABLET    Take 500 mg by mouth 2 times daily     COMPRESSION STOCKINGS MISC    by Does not apply route 20-30 mmHg -- knee high    CYANOCOBALAMIN 1000 MCG/ML INJECTION    INJECT 1 ML ONCE EVERY MONTH    DORZOLAMIDE-TIMOLOL (COSOPT) 22.3-6.8 MG/ML OPHTHALMIC SOLUTION    Place 1 drop into both eyes every 12 hours    FLAX OIL    Take by mouth daily    FLUZONE QUADRIVALENT 0.5 ML INJECTION    TO BE ADMINISTERED BY PHARMACIST FOR IMMUNIZATION    HYDRALAZINE (APRESOLINE) 10 MG TABLET    Take 10 mg by mouth 2 times daily as needed    LATANOPROST (XALATAN) 0.005 % OPHTHALMIC SOLUTION    Place 1 drop into both eyes nightly    LEVOTHYROXINE (SYNTHROID) 125 MCG TABLET    TAKE ONE TABLET BY MOUTH DAILY    LISINOPRIL (PRINIVIL;ZESTRIL) 2.5 MG TABLET    Take 2.5 mg by mouth daily    LORATADINE (CLARITIN) 10 MG TABLET    Take 10 mg by mouth daily    METOPROLOL TARTRATE (LOPRESSOR) 25 MG TABLET    Take 25 mg by mouth 2 times daily    NEEDLES & SYRINGES MISC    by Does not apply route. POLYETHYLENE GLYCOL (GLYCOLAX) POWDER    Take 17 g by mouth 2 times daily as needed    POTASSIUM CHLORIDE (KLOR-CON M) 20 MEQ EXTENDED RELEASE TABLET    Take 0.5 tablets by mouth daily    RANITIDINE (ZANTAC) 150 MG TABLET    TAKE ONE TABLET BY MOUTH TWICE A DAY    SENNA-DOCUSATE (PERICOLACE) 8.6-50 MG PER TABLET    Take 1 tablet by mouth 2 times daily as needed for Constipation    SOLIFENACIN (VESICARE) 5 MG TABLET    Take 5 mg by mouth daily    SYRINGE-NEEDLE, DISP, 3 ML (B-D 3CC LUER-ENEDINA SYR 25GX5/8\") 25G X 5/8\" 3 ML MISC    1 mL by Alternating Nares route every 30 days    TRIAMCINOLONE (KENALOG) 0.1 % CREAM    Apply topically 2 times daily.     VITAMIN D (CHOLECALCIFEROL) 1000 UNIT TABS TABLET    Take 2,000 Units by mouth daily         Review of Systems:  Review of Systems   Constitutional: Negative for chills and fever. HENT: Negative for congestion, facial swelling and sore throat. Eyes: Negative for discharge and redness. Respiratory: Negative for apnea, choking and shortness of breath. Cardiovascular: Negative for chest pain. Gastrointestinal: Negative for abdominal pain, nausea and vomiting. Genitourinary: Negative for dysuria. Musculoskeletal: Negative for back pain, neck pain and neck stiffness. Neurological: Negative for dizziness, tremors, seizures, weakness and headaches. All other systems reviewed and are negative. Positives and Pertinent negatives as per HPI. Except as noted above in the ROS, problem specific ROS was completed and is negative. Physical Exam:  Physical Exam  Vitals signs and nursing note reviewed. Constitutional:       Appearance: She is well-developed. She is not diaphoretic. HENT:      Head: Normocephalic and atraumatic. Nose: Nose normal.   Eyes:      General:         Right eye: No discharge. Left eye: No discharge. Extraocular Movements: Extraocular movements intact. Conjunctiva/sclera: Conjunctivae normal.      Pupils: Pupils are equal, round, and reactive to light. Neck:      Musculoskeletal: Normal range of motion and neck supple. Cardiovascular:      Rate and Rhythm: Normal rate and regular rhythm. Heart sounds: Normal heart sounds. No murmur. No friction rub. No gallop. Pulmonary:      Effort: Pulmonary effort is normal. No respiratory distress. Breath sounds: Normal breath sounds. No wheezing or rales. Chest:      Chest wall: No tenderness. Abdominal:      General: Abdomen is flat. Bowel sounds are normal. There is no distension. Palpations: Abdomen is soft. There is no mass. Tenderness: There is no abdominal tenderness. Musculoskeletal: Normal range of motion.       Cervical back: She exhibits normal range of motion, no tenderness and no bony tenderness. Thoracic back: She exhibits normal range of motion, no tenderness and no bony tenderness. Lumbar back: She exhibits normal range of motion, no tenderness and no bony tenderness. Skin:     General: Skin is warm and dry. Neurological:      Mental Status: She is alert and oriented to person, place, and time.    Psychiatric:         Behavior: Behavior normal.         MEDICAL DECISION MAKING    Vitals:    Vitals:    08/03/20 0121 08/03/20 0441   BP: 122/67    Pulse: 82 78   Resp: 14 21   Temp: 97.5 °F (36.4 °C)    TempSrc: Oral    SpO2: 94%        LABS:  Labs Reviewed   CBC WITH AUTO DIFFERENTIAL - Abnormal; Notable for the following components:       Result Value    Hemoglobin 11.8 (*)     RDW 16.9 (*)     Lymphocytes Absolute 0.5 (*)     All other components within normal limits    Narrative:     Performed at:  [address] [employer] [place]   Phone [phone]   COMPREHENSIVE METABOLIC PANEL - Abnormal; Notable for the following components:    Sodium 131 (*)     Potassium 6.4 (*)     CO2 11 (*)     Anion Gap 18 (*)     Glucose 125 (*)     BUN 78 (*)     CREATININE 5.3 (*)     GFR Non- 7 (*)     GFR  9 (*)     ALT 9 (*)     All other components within normal limits    Narrative:     Evan Kiya tel. [phone],  Chemistry results called to and read back by Ritu Bell RN, 08/03/2020  03:12, by [facility]  Chemistry results called to and read back by Ritu Bell RN, 08/03/2020  03:12, by [facility]  Performed at:  [address] [employer] [address]   Phone [phone]   TROPONIN - Abnormal; Notable for the following components:    Troponin 0.22 (*)     All other components within normal limits    Narrative:     Performed at:  [address] [employer] [address]   Phone [phone]   URINE RT REFLEX TO CULTURE - Abnormal; Notable for the following components:    Clarity, UA CLOUDY (*)     Ketones, Urine TRACE (*)     Blood, Urine LARGE (*)     Protein,  (*)     Leukocyte Esterase, Urine LARGE (*)     All other components within normal limits    Narrative:     Performed at:  [address] [address]   Phone [phone]   BASIC METABOLIC PANEL - Abnormal; Notable for the following components:    Sodium 133 (*)     Potassium 6.1 (*)     CO2 12 (*)     Anion Gap 18 (*)     Glucose 121 (*)     BUN 78 (*)     CREATININE 5.2 (*)     GFR Non- 8 (*)     GFR  9 (*)     All other components within normal limits    Narrative:     Ashley Castellanosronald tel. [phone],  Chemistry results called to and read back by Burton Bradley RN, 08/03/2020  04:24, by [facility]  Chemistry results called to and read back by Burton Bradley RN, 08/03/2020  04:24, by [facility]  Performed at:  [address] [address]   Phone [phone]   MICROSCOPIC URINALYSIS   COVID-19   COVID-19   COVID-19   COVID-19   POCT GLUCOSE   POCT GLUCOSE   POCT GLUCOSE   POCT GLUCOSE   POCT GLUCOSE   POCT GLUCOSE   POCT GLUCOSE        Remainder of labs reviewed and werenegative at this time or not returned at the time of this note. RADIOLOGY:   Non-plain film images such as CT, Ultrasound and MRI are read by the radiologist. Barber Escobedo PA-C have directly visualized the radiologic plain film image(s) with the below findings:        Interpretation per the Radiologist below, if available at the time of this note:    [address]   Final Result   Small right frontal scalp contusion. Comminuted displaced left occipital   condyle and C1 lateral mass fracture. Please see same day cervical spine CT   for further details. No acute intracranial abnormality.       Prominence of the CSF space along the right convexity may relate to patient   positioning as opposed to a subdural hygroma. Age-related cerebral volume loss and chronic white matter microvascular   ischemic disease. CT CERVICAL SPINE WO CONTRAST   Preliminary Result   Comminuted and displaced left occipital condyle and C1 left lateral mass   fractures with extension to the foramen transversarium. Recommend follow-up   CT angiogram of the neck. Asymmetric widening of the anterior right atlanto-occipital joint. Cervical spondylosis. XR KNEE LEFT (1-2 VIEWS)   Final Result   No fracture or malalignment. XR HIP LEFT (2-3 VIEWS)   Final Result   No evident acute findings in the pelvis or left hip. However, bony   demineralization partially limits evaluation for fractures. Consider further   evaluation with MRI or CT if there are clinical findings of acute fracture. XR CHEST PORTABLE   Final Result   No acute disease. CTA HEAD NECK W CONTRAST    (Results Pending)        Xr Hip Left (2-3 Views)    Result Date: 8/3/2020  EXAMINATION: 1 X-RAY VIEW OF THE PELVIS AND 2 XRAY VIEWS OF THE LEFT HIP 8/3/2020 2:02 am COMPARISON: Pelvis and bilateral hips radiograph 10/17/2018 HISTORY: ORDERING SYSTEM PROVIDED HISTORY: Injury TECHNOLOGIST PROVIDED HISTORY: Reason for exam:->Injury Reason for Exam: fall pain lt hip Acuity: Acute FINDINGS: Diffuse osseous demineralization. No obvious acute fracture. Remote fractures of the left superior and inferior pubic rami. Joints maintain anatomic alignment. No obvious acute soft tissue abnormality. No evident acute findings in the pelvis or left hip. However, bony demineralization partially limits evaluation for fractures. Consider further evaluation with MRI or CT if there are clinical findings of acute fracture.      Xr Knee Left (1-2 Views)    Result Date: 8/3/2020  EXAMINATION: 2 XRAY VIEWS OF THE LEFT KNEE 8/3/2020 2:02 am COMPARISON: 04/18/2016 HISTORY: ORDERING SYSTEM PROVIDED HISTORY: Injury TECHNOLOGIST PROVIDED HISTORY: Reason for exam:->Injury Reason for Exam: fall pain lt knee Acuity: Acute FINDINGS: There is no fracture or malalignment. Lateral and patellofemoral compartment spurring is noted. Ossification is again seen adjacent to the medial femoral condyle. There is no joint effusion. Prominent atherosclerotic calcifications are identified. No fracture or malalignment. Ct Head Wo Contrast    Result Date: 8/3/2020  EXAMINATION: CT OF THE HEAD WITHOUT CONTRAST  8/3/2020 2:10 am TECHNIQUE: CT of the head was performed without the administration of intravenous contrast. Dose modulation, iterative reconstruction, and/or weight based adjustment of the mA/kV was utilized to reduce the radiation dose to as low as reasonably achievable. COMPARISON: Same day cervical spine CT HISTORY: ORDERING SYSTEM PROVIDED HISTORY: Injury TECHNOLOGIST PROVIDED HISTORY: Has a \"code stroke\" or \"stroke alert\" been called? ->No Reason for exam:->Injury Reason for Exam: fell r/o head injury Acuity: Acute FINDINGS: BRAIN/VENTRICLES: There is no acute intracranial hemorrhage, mass effect or midline shift. No abnormal extra-axial fluid collection. The gray-white differentiation is maintained without evidence of an acute infarct. There is no evidence of hydrocephalus. Moderate diffuse cerebral volume loss with proportionate prominence of the ventricles and sulci. Prominence of the CSF space along the right convexity may relate to patient positioning as opposed to a subdural hygroma. Moderate patchy low attenuation in the subcortical, periventricular deep white matter likely reflects sequela of chronic microvascular ischemia. ORBITS: The visualized portion of the orbits demonstrate no acute abnormality. SINUSES: The visualized paranasal sinuses and mastoid air cells demonstrate no acute abnormality. SOFT TISSUES/SKULL:  Small right frontal scalp contusion.   No underlying displaced calvarial fracture however there is a comminuted displaced left occipital condyle and C1 lateral mass fracture. Small right frontal scalp contusion. Comminuted displaced left occipital condyle and C1 lateral mass fracture. Please see same day cervical spine CT for further details. No acute intracranial abnormality. Prominence of the CSF space along the right convexity may relate to patient positioning as opposed to a subdural hygroma. Age-related cerebral volume loss and chronic white matter microvascular ischemic disease. Ct Cervical Spine Wo Contrast    Comminuted and displaced left occipital condyle and C1 left lateral mass fractures with extension to the foramen transversarium. Recommend follow-up CT angiogram of the neck. Asymmetric widening of the anterior right atlanto-occipital joint. Cervical spondylosis. Xr Chest Portable    Result Date: 8/3/2020  EXAMINATION: ONE XRAY VIEW OF THE CHEST 8/3/2020 2:02 am COMPARISON: 10/19/2018 HISTORY: ORDERING SYSTEM PROVIDED HISTORY: Fall TECHNOLOGIST PROVIDED HISTORY: Reason for exam:->Fall Reason for Exam: fall Acuity: Acute FINDINGS: Left chest port remains in place. There is minor bibasilar scarring. Granuloma is noted in the right upper lobe. No acute infiltrate is identified. Cardiomegaly is stable. There is no evidence for pleural effusion or pneumothorax. There is no acute osseous abnormality. No acute disease.           MEDICAL DECISION MAKING / ED COURSE:      PROCEDURES:   Procedures    None    Patient was given:  Medications   iopamidol (ISOVUE-370) 76 % injection 75 mL (has no administration in time range)   0.9 % sodium chloride bolus (has no administration in time range)   insulin regular (HUMULIN R;NOVOLIN R) injection 10 Units (has no administration in time range)     And   dextrose 50 % IV solution (has no administration in time range)   glucose (GLUTOSE) 40 % oral gel 15 g (has no administration in time range) dextrose 50 % IV solution (has no administration in time range)   glucagon (rDNA) injection 1 mg (has no administration in time range)   dextrose 5 % solution (has no administration in time range)   calcium gluconate 10 % injection 1 g (has no administration in time range)   acetaminophen (TYLENOL) tablet 1,000 mg (1,000 mg Oral Given 8/3/20 3497)     Emergency room course: Patient on exam pupils equal round and reactive to light extraocular movement is intact. Scalp show no hematoma. Neck is supple. No midline tenderness cervical, thoracic or lumbar spine. Clavicle scapular region show no tenderness. Chest wall show no tenderness with palpation. Cardiovascular regular rhythm, lungs are clear. No wheeze, rales or rhonchi noted. Abdomen is soft nontender. Nondistended. Normal bowel sounds all 4 quadrants. No rebound or guarding noted. She is able to raise leg on the left but it still hurts in her hip. She has bruising noted to the anterior left knee. Very tender with palpation. Full range of motion the lower right extremity. Hip show no tenderness on the right. Right knee shows no tenderness mild crepitus with flexion-extension. Distal pulses bilaterally pedal pulses 2+ bilaterally. Capillary refill less than 2 seconds all digits.  strength 4+ equal bilaterally. She has no facial drooping no slurred speech noted. She is alert and oriented. She does not know where she is at. She says she thought she was in a warehouse. Confused time and place. According to her son and her daughter-in-law she appears to be more confused than she usually is and they noticed this in the last week. Thinks he may have a UTI. Lab result from today shows:  Urinalysis shows large leukocytes, negative for nitrites, large blood and trace of ketones. CBC shows white count of 6.8, RBC 4.21, hemoglobin 11.8, hematocrit 36.1.   CMP shows sodium 131, potassium 6.4, chloride 102 with a BUN of 78 creatinine 5.3.  Appears to be acute. CO2 of 11. Troponin 0 0.22. Since patient electrolytes are significantly abnormal for her I had the nurse to repeat to make sure these values are true. Repeat BMP shows sodium at 133, potassium 6.1, chloride 103 BUN of 78 creatinine 5.2. CO2 of 12.    Spoke with  neurosurgery from [address] [place] [address]. And he recommended patient be transferred. He states that he most likely would not be doing any surgery on this patient with her age. Talk with  hospitalist at [address] [place] [address]. He accepted the patient for transfer. He will place the patient in PCU. Talk with patient as well as family regarding this transfer. They are okay. He did want me to start the hyperkalemia protocol as well as make sure the patient gets put in a Cha and start her on a liter of fluids and COVID test her. Patient be transferred in stable condition. The patient tolerated their visit well. I evaluated the patient. The physician was available for consultation as needed. The patient and / or the family were informed of the results of any tests, a time was given to answer questions, a plan was proposed and they agreed with plan. CLINICAL IMPRESSION:  1. Acute metabolic encephalopathy    2. Acute renal failure, unspecified acute renal failure type ([address]    3. Unspecified occipital condyle fracture, initial encounter for closed fracture ([address]    4. Closed displaced lateral mass fracture of first cervical vertebra, initial encounter ([address]    5. Urinary tract infection with hematuria, site unspecified        DISPOSITION Decision To Transfer 08/03/2020 04:47:39 AM      PATIENT REFERRED TO:  No follow-up provider specified.     DISCHARGE MEDICATIONS:  New Prescriptions    No medications on file       DISCONTINUED MEDICATIONS:  Discontinued Medications    No medications on file              (Please note the MDM and HPI sections of this note were completed with a voice recognition program.  Efforts were made to edit the dictations but occasionally words are mis-transcribed.)    Electronically signed, Elroy Peabody, PA-C,          Elroy Peabody, PA-C  08/03/20 55 Jerald Armenta PA-C  08/03/20 0286

## 2020-08-03 NOTE — CONSULTS
NEUROSURGERY CONSULT NOTE    FERMIN Chu  [id number]   3/12/1922   8/3/2020    Requesting physician: Michael Alonso MD    Reason for consultation: Occipital condyle and C1 Fracture    History of present illness: Patient is a 80 y.o. female w/ PMH of HTN, HLD and GERD who presented on 8/2/2020 to [facility] ED s/p fall. Patient is in [place] precautions so information provided by previous provider and nursing notes. Patient was last seen at 10:00 pm last night by the assisted living nurse at [facility] sitting in a chair. When they came back to see her at 12 midnight they found her on the floor c/o left hip and knee pain. Denies loss of consciousness. She is alert. Denies chest pain, no abdominal pain, she does complain of pain to the back of her head. Denies being on blood thinner. No numbness or tingling in her feet or hands. She ambulates a little bit with a walker, but mainly uses a wheelchair. No other complaints. ROS:   MACK 2/2 COVID precautions    Allergies   Allergen Reactions    Bee Venom        Past Medical History:   Diagnosis Date    Carcinoma in situ of colon 1998    Diarrhea     Esophageal reflux     Other extrapyramidal disease and abnormal movement disorder     Pernicious anemia     S/P colonoscopy 4/09    OK - no further eval.    Unspecified cataract     Unspecified hypothyroidism     Unspecified pruritic disorder         Past Surgical History:   Procedure Laterality Date    COLECTOMY      partial    COLONOSCOPY  4/09    OK - no further eval    HYSTERECTOMY         Social History     Occupational History    Not on file   Tobacco Use    Smoking status: Never Smoker    Smokeless tobacco: Never Used   Substance and Sexual Activity    Alcohol use: No    Drug use: No    Sexual activity: Not on file        No family history on file. No outpatient medications have been marked as taking for the 8/3/20 encounter [facility] Encounter).         Current Facility-Administered Medications Medication Dose Route Frequency Provider Last Rate Last Dose    levothyroxine (SYNTHROID) tablet 125 mcg  125 mcg Oral Daily Caren Hernandez MD        sodium chloride flush 0.9 % injection 10 mL  10 mL Intravenous 2 times per day Caren Hernandez MD   10 mL at 08/03/20 1036    sodium chloride flush 0.9 % injection 10 mL  10 mL Intravenous PRN Caren Hernandez MD        acetaminophen (TYLENOL) tablet 650 mg  650 mg Oral Q6H PRN Caren Hernandez MD        Or   [address] [place] [address] acetaminophen (TYLENOL) suppository 650 mg  650 mg Rectal Q6H PRN Caren Hernandez MD        polyethylene glycol (GLYCOLAX) packet 17 g  17 g Oral Daily PRN Caren Hernandez MD        promethazine (PHENERGAN) tablet 12.5 mg  12.5 mg Oral Q6H PRN Caren Hernandez MD        Or    ondansetron [facility]) injection 4 mg  4 mg Intravenous Q6H PRN Caren Hernandez MD        [Held by provider] magnesium sulfate 2 g in 50 mL IVPB premix  2 g Intravenous PRN Caren Hernandez MD        cefTRIAXone (ROCEPHIN) 1 g IVPB in 50 mL D5W minibag  1 g Intravenous Q24H Caren Hernandez  mL/hr at 08/03/20 1031 1 g at 08/03/20 1031    insulin lispro (1 Unit Dial) 0-6 Units  0-6 Units Subcutaneous TID WC Caren Hernandez MD        insulin lispro (1 Unit Dial) 0-3 Units  0-3 Units Subcutaneous Nightly Caren Hernandez MD        glucose (GLUTOSE) 40 % oral gel 15 g  15 g Oral PRN Caren Hernandez MD        dextrose 50 % IV solution  12.5 g Intravenous PRN Caren Hernandez MD   12.5 g at 08/03/20 0930    glucagon (rDNA) injection 1 mg  1 mg Intramuscular PRN Caren Hernandez MD        dextrose 5 % solution  100 mL/hr Intravenous PRN Caren Hernandez MD        sodium bicarbonate 50 mEq in dextrose 5 % 1,000 mL infusion   Intravenous Continuous Caren Hernandez MD 50 mL/hr at 08/03/20 1031          Objective:  BP (!) 99/55   Pulse 78   Temp 95.6 °F (35.3 °C) (Rectal)   Resp 18   Ht 5' 2\" (1.575 m)   Wt 155 lb (70.3 kg)   SpO2 97%   BMI 28.35 kg/m²     Physical Exam:   Due to limiting exposure to COVID-19, initial encounter was completed by using EMR and from conversations with medical team involved in case. Patient was not interviewed or examined, at this time. I have personally reviewed the reports and images of labs, radiological studies, current and old medical records    Per bedside RN: Patient is complaining of left hip and knee pain along with back pain. The patient is very hard of hearing even with hearing aids. The faculty at the patient's ECF states that the patient is normally alert and oriented but they feel that she was confused prior to arrival at ED. Radiological Findings:  Ct Head Wo Contrast  Result Date: 8/3/2020  Small right frontal scalp contusion. Comminuted displaced left occipital condyle and C1 lateral mass fracture. Please see same day cervical spine CT for further details. No acute intracranial abnormality. Prominence of the CSF space along the right convexity may relate to patient positioning as opposed to a subdural hygroma. Age-related cerebral volume loss and chronic white matter microvascular ischemic disease. Ct Cervical Spine Wo Contrast  Result Date: 8/3/2020  Comminuted and displaced left occipital condyle and C1 left lateral mass fractures with extension to the foramen transversarium. Recommend follow-up CT angiogram of the neck. Asymmetric widening of the anterior right atlanto-occipital joint. Cervical spondylosis.      Labs:  Recent Labs     08/03/20  1005   WBC 4.2   HGB 10.6*   HCT 32.1*          Recent Labs     08/03/20  1005   *   K 5.1      CO2 14*   BUN 76*   CREATININE 4.6*   GLUCOSE 131*   CALCIUM 9.0       Recent Labs     08/03/20  1005   PROTIME 12.0   INR 1.03       Patient Active Problem List    Diagnosis Date Noted    Acute metabolic encephalopathy 09/44/7528    Closed bimalleolar fracture of right ankle     Bimalleolar ankle fracture, left, closed, initial encounter     Impacted cerumen of both ears 02/08/2016    Skin lesion

## 2020-08-03 NOTE — CONSULTS
Nephrology Consult Note                                                                                                                                                                                                                                                                                                                                                               Office : [phone]     Fax :[phone]      Patient's Name: Heena Montelongo  11:56 AM  8/3/2020    Reason for Consult:  Elevated Cr  Requesting Physician:  Audrey Enamorado MD      Chief Complaint:  Fall    History of Present Ilness:    Heena Montelongo is a 80 y.o. female with PMH significant for carcinoma in situ of colon, pernicious anemia, and hypothyroidism. She presented to [facility] ED because she fell and was found on the floor. She is complaining of left hip and knee pain, as well as pain at the back of her head but she denies loss of consciousness. Denies chest pain, abdominal pain, and numbness or tingling. Denies being on blood thinner. According to her son, she ambulates a little bit with a walker, but mainly uses a wheelchair. He reports that she has been a little more confused in the last week. Past Medical History:   Diagnosis Date    Carcinoma in situ of colon 1998    Diarrhea     Esophageal reflux     Other extrapyramidal disease and abnormal movement disorder     Pernicious anemia     S/P colonoscopy 4/09    OK - no further eval.    Unspecified cataract     Unspecified hypothyroidism     Unspecified pruritic disorder        Past Surgical History:   Procedure Laterality Date    COLECTOMY      partial    COLONOSCOPY  4/09    OK - no further eval    HYSTERECTOMY         No family history on file. reports that she has never smoked. She has never used smokeless tobacco. She reports that she does not drink alcohol or use drugs.     Allergies:  Bee venom    Current Medications:    levothyroxine (SYNTHROID) tablet 125 mcg, Daily  sodium chloride flush 0.9 % injection 10 mL, 2 times per day  sodium chloride flush 0.9 % injection 10 mL, PRN  acetaminophen (TYLENOL) tablet 650 mg, Q6H PRN    Or  acetaminophen (TYLENOL) suppository 650 mg, Q6H PRN  polyethylene glycol (GLYCOLAX) packet 17 g, Daily PRN  promethazine (PHENERGAN) tablet 12.5 mg, Q6H PRN    Or  ondansetron (ZOFRAN) injection 4 mg, Q6H PRN  [Held by provider] magnesium sulfate 2 g in 50 mL IVPB premix, PRN  cefTRIAXone (ROCEPHIN) 1 g IVPB in 50 mL D5W minibag, Q24H  insulin lispro (1 Unit Dial) 0-6 Units, TID WC  insulin lispro (1 Unit Dial) 0-3 Units, Nightly  glucose (GLUTOSE) 40 % oral gel 15 g, PRN  dextrose 50 % IV solution, PRN  glucagon (rDNA) injection 1 mg, PRN  dextrose 5 % solution, PRN  sodium bicarbonate 150 mEq in dextrose 5 % 1,000 mL infusion, Continuous        Review of Systems:   14 point ROS obtained but were negative except mentioned in HPI      Physical exam:     Vitals:  BP (!) 99/55   Pulse 78   Temp 95.6 °F (35.3 °C) (Rectal)   Resp 18   Ht 5' 2\" (1.575 m)   Wt 155 lb (70.3 kg)   SpO2 97%   BMI 28.35 kg/m²   Constitutional: Non responsive except to painful stimuli   Skin: no rash, turgor wnl  Heent:  eomi, mmm  Neck: no bruits or jvd noted  Cardiovascular:  Murmur, S1, S2 without r/g  Respiratory: CTA B without w/r/r  Abdomen:  +bs, soft, nt, nd  Ext: no lower extremity edema, brusing on L knee and hip  Psychiatric: nonresponsive    Data:   Labs:  CBC:   Recent Labs     08/03/20  0226 08/03/20  1005   WBC 6.8 4.2   HGB 11.8* 10.6*    137     BMP:    Recent Labs     08/03/20  0226 08/03/20  0333 08/03/20  1005   * 133* 134*   K 6.4* 6.1* 5.1    103 106   CO2 11* 12* 14*   BUN 78* 78* 76*   CREATININE 5.3* 5.2* 4.6*   GLUCOSE 125* 121* 131*     Ca/Mg/Phos:   Recent Labs     08/03/20 0226 08/03/20  0333 08/03/20  1005   CALCIUM 9.2 9.2 9.0     Hepatic:   Recent Labs     08/03/20 0226 08/03/20  1005 AST 21 23   ALT 9* 8*   BILITOT 0.4 0.4   ALKPHOS 65 61     Troponin:   Recent Labs     08/03/20  0226   TROPONINI 0.22*     BNP: No results for input(s): BNP in the last 72 hours. Lipids: No results for input(s): CHOL, TRIG, HDL, LDLCALC, LABVLDL in the last 72 hours. ABGs: No results for input(s): PHART, PO2ART, DUY8QMK in the last 72 hours. INR:   Recent Labs     08/03/20  1005   INR 1.03     UA:  Recent Labs     08/03/20  0338   COLORU YELLOW   CLARITYU CLOUDY*   GLUCOSEU Negative   BILIRUBINUR Negative   KETUA TRACE*   SPECGRAV 1.013   BLOODU LARGE*   PHUR 5.5   PROTEINU 100*   UROBILINOGEN 0.2   NITRU Negative   LEUKOCYTESUR LARGE*   LABMICR YES   URINETYPE NotGiven      Urine Microscopic:   Recent Labs     08/03/20  0338   COMU see below   HYALCAST 0   WBCUA 335*   RBCUA 77*   EPIU 0     Urine Culture: No results for input(s): LABURIN in the last 72 hours. Urine Chemistry: No results for input(s): Joelle Dalton, PROTEINUR, NAUR in the last 72 hours. IMAGING:  US RENAL COMPLETE    (Results Pending)   MRA NECK WO CONTRAST    (Results Pending)   MRI BRAIN WO CONTRAST    (Results Pending)       Assessment/Plan   1. JUANCARLOS - likely secondary to hypovolemia likely   - transitioned pt to sodium bicarbonate 150mEq in D5W, 100mh/hr  - urine chemistry, uric acid level  - CK to rule out rhabdomyolysis as cause, pts newman urine bloody but only 77 RBCs per UA  - renal U/S       2. Anion gap metabolic acidosis  - resolved, 18 today but 14 on repeat  - may be secondary to rhabdomyolysis      3. Acute metabolic encephalopathy  - likely secondary to UTI  - pt started on rocephin 1g IVPB  - per hospitalist    4.  Occipital condyle & C1 fracture  - follow neurosurgery recs  - MRI/MRA w/o contrast head/neck to rule out dissection and stroke  - [place] J collar worn at all times, cervical collar may be removed for eating/cleaning      Thank you for allowing us to participate in care of [address] free to contact me   [facility] [address]  Office : [phone]  Fax :[phone]    Agree with plan above     Kate Allred MD

## 2020-08-04 PROBLEM — U07.1 COVID-19: Status: ACTIVE | Noted: 2020-08-04

## 2020-08-04 LAB
A/G RATIO: 1.3 (ref 1.1–2.2)
ALBUMIN SERPL-MCNC: 3.1 G/DL (ref 3.4–5)
ALBUMIN SERPL-MCNC: 3.2 G/DL (ref 3.4–5)
ALP BLD-CCNC: 61 U/L (ref 40–129)
ALT SERPL-CCNC: 8 U/L (ref 10–40)
ANION GAP SERPL CALCULATED.3IONS-SCNC: 8 MMOL/L (ref 3–16)
ANION GAP SERPL CALCULATED.3IONS-SCNC: 9 MMOL/L (ref 3–16)
APTT: 29.8 SEC (ref 24.2–36.2)
AST SERPL-CCNC: 27 U/L (ref 15–37)
BASOPHILS ABSOLUTE: 0 K/UL (ref 0–0.2)
BASOPHILS RELATIVE PERCENT: 1 %
BILIRUB SERPL-MCNC: 0.3 MG/DL (ref 0–1)
BUN BLDV-MCNC: 55 MG/DL (ref 7–20)
BUN BLDV-MCNC: 57 MG/DL (ref 7–20)
CALCIUM SERPL-MCNC: 8.4 MG/DL (ref 8.3–10.6)
CALCIUM SERPL-MCNC: 8.5 MG/DL (ref 8.3–10.6)
CHLORIDE BLD-SCNC: 101 MMOL/L (ref 99–110)
CHLORIDE BLD-SCNC: 101 MMOL/L (ref 99–110)
CO2: 28 MMOL/L (ref 21–32)
CO2: 28 MMOL/L (ref 21–32)
CREAT SERPL-MCNC: 3.3 MG/DL (ref 0.6–1.2)
CREAT SERPL-MCNC: 3.3 MG/DL (ref 0.6–1.2)
D DIMER: 656 NG/ML DDU (ref 0–229)
EOSINOPHILS ABSOLUTE: 0.2 K/UL (ref 0–0.6)
EOSINOPHILS RELATIVE PERCENT: 4 %
FERRITIN: 347.2 NG/ML (ref 15–150)
FIBRINOGEN: 298 MG/DL (ref 200–397)
GFR AFRICAN AMERICAN: 16
GFR AFRICAN AMERICAN: 16
GFR NON-AFRICAN AMERICAN: 13
GFR NON-AFRICAN AMERICAN: 13
GLOBULIN: 2.5 G/DL
GLUCOSE BLD-MCNC: 105 MG/DL (ref 70–99)
GLUCOSE BLD-MCNC: 126 MG/DL (ref 70–99)
GLUCOSE BLD-MCNC: 137 MG/DL (ref 70–99)
GLUCOSE BLD-MCNC: 146 MG/DL (ref 70–99)
GLUCOSE BLD-MCNC: 146 MG/DL (ref 70–99)
GLUCOSE BLD-MCNC: 151 MG/DL (ref 70–99)
GLUCOSE BLD-MCNC: 162 MG/DL (ref 70–99)
GLUCOSE BLD-MCNC: 58 MG/DL (ref 70–99)
HCT VFR BLD CALC: 32.6 % (ref 36–48)
HEMOGLOBIN: 11 G/DL (ref 12–16)
INR BLD: 1.03 (ref 0.86–1.14)
LACTATE DEHYDROGENASE: 423 U/L (ref 100–190)
LYMPHOCYTES ABSOLUTE: 0.5 K/UL (ref 1–5.1)
LYMPHOCYTES RELATIVE PERCENT: 11.6 %
MCH RBC QN AUTO: 28.6 PG (ref 26–34)
MCHC RBC AUTO-ENTMCNC: 33.6 G/DL (ref 31–36)
MCV RBC AUTO: 85.2 FL (ref 80–100)
MONOCYTES ABSOLUTE: 0.6 K/UL (ref 0–1.3)
MONOCYTES RELATIVE PERCENT: 12.6 %
NEUTROPHILS ABSOLUTE: 3.4 K/UL (ref 1.7–7.7)
NEUTROPHILS RELATIVE PERCENT: 70.8 %
PDW BLD-RTO: 17.2 % (ref 12.4–15.4)
PERFORMED ON: ABNORMAL
PHOSPHORUS: 3.1 MG/DL (ref 2.5–4.9)
PLATELET # BLD: 132 K/UL (ref 135–450)
PMV BLD AUTO: 8.1 FL (ref 5–10.5)
POTASSIUM REFLEX MAGNESIUM: 5.4 MMOL/L (ref 3.5–5.1)
POTASSIUM SERPL-SCNC: 4.9 MMOL/L (ref 3.5–5.1)
PROTHROMBIN TIME: 11.9 SEC (ref 10–13.2)
RBC # BLD: 3.83 M/UL (ref 4–5.2)
SODIUM BLD-SCNC: 137 MMOL/L (ref 136–145)
SODIUM BLD-SCNC: 138 MMOL/L (ref 136–145)
TOTAL PROTEIN: 5.7 G/DL (ref 6.4–8.2)
TROPONIN: 0.19 NG/ML
TROPONIN: 0.26 NG/ML
TROPONIN: 0.28 NG/ML
WBC # BLD: 4.7 K/UL (ref 4–11)

## 2020-08-04 PROCEDURE — 85379 FIBRIN DEGRADATION QUANT: CPT

## 2020-08-04 PROCEDURE — 2580000003 HC RX 258: Performed by: INTERNAL MEDICINE

## 2020-08-04 PROCEDURE — 85384 FIBRINOGEN ACTIVITY: CPT

## 2020-08-04 PROCEDURE — 2060000000 HC ICU INTERMEDIATE R&B

## 2020-08-04 PROCEDURE — 85025 COMPLETE CBC W/AUTO DIFF WBC: CPT

## 2020-08-04 PROCEDURE — 6370000000 HC RX 637 (ALT 250 FOR IP): Performed by: INTERNAL MEDICINE

## 2020-08-04 PROCEDURE — U0003 INFECTIOUS AGENT DETECTION BY NUCLEIC ACID (DNA OR RNA); SEVERE ACUTE RESPIRATORY SYNDROME CORONAVIRUS 2 (SARS-COV-2) (CORONAVIRUS DISEASE [COVID-19]), AMPLIFIED PROBE TECHNIQUE, MAKING USE OF HIGH THROUGHPUT TECHNOLOGIES AS DESCRIBED BY CMS-2020-01-R: HCPCS

## 2020-08-04 PROCEDURE — 2500000003 HC RX 250 WO HCPCS: Performed by: INTERNAL MEDICINE

## 2020-08-04 PROCEDURE — 84484 ASSAY OF TROPONIN QUANT: CPT

## 2020-08-04 PROCEDURE — 97163 PT EVAL HIGH COMPLEX 45 MIN: CPT

## 2020-08-04 PROCEDURE — 85610 PROTHROMBIN TIME: CPT

## 2020-08-04 PROCEDURE — 97530 THERAPEUTIC ACTIVITIES: CPT

## 2020-08-04 PROCEDURE — 97166 OT EVAL MOD COMPLEX 45 MIN: CPT

## 2020-08-04 PROCEDURE — 80053 COMPREHEN METABOLIC PANEL: CPT

## 2020-08-04 PROCEDURE — 85730 THROMBOPLASTIN TIME PARTIAL: CPT

## 2020-08-04 PROCEDURE — 6360000002 HC RX W HCPCS: Performed by: INTERNAL MEDICINE

## 2020-08-04 PROCEDURE — 36415 COLL VENOUS BLD VENIPUNCTURE: CPT

## 2020-08-04 PROCEDURE — 92610 EVALUATE SWALLOWING FUNCTION: CPT

## 2020-08-04 PROCEDURE — 99222 1ST HOSP IP/OBS MODERATE 55: CPT | Performed by: INTERNAL MEDICINE

## 2020-08-04 PROCEDURE — 83615 LACTATE (LD) (LDH) ENZYME: CPT

## 2020-08-04 PROCEDURE — 82728 ASSAY OF FERRITIN: CPT

## 2020-08-04 RX ORDER — SODIUM CHLORIDE, SODIUM LACTATE, POTASSIUM CHLORIDE, CALCIUM CHLORIDE 600; 310; 30; 20 MG/100ML; MG/100ML; MG/100ML; MG/100ML
INJECTION, SOLUTION INTRAVENOUS CONTINUOUS
Status: DISCONTINUED | OUTPATIENT
Start: 2020-08-04 | End: 2020-08-06

## 2020-08-04 RX ORDER — ACETAMINOPHEN 325 MG/1
650 TABLET ORAL EVERY 6 HOURS PRN
Status: DISCONTINUED | OUTPATIENT
Start: 2020-08-04 | End: 2020-08-14 | Stop reason: HOSPADM

## 2020-08-04 RX ORDER — ACETAMINOPHEN 650 MG/1
650 SUPPOSITORY RECTAL EVERY 6 HOURS PRN
Status: DISCONTINUED | OUTPATIENT
Start: 2020-08-04 | End: 2020-08-14 | Stop reason: HOSPADM

## 2020-08-04 RX ORDER — HEPARIN SODIUM 5000 [USP'U]/ML
5000 INJECTION, SOLUTION INTRAVENOUS; SUBCUTANEOUS EVERY 8 HOURS SCHEDULED
Status: DISCONTINUED | OUTPATIENT
Start: 2020-08-04 | End: 2020-08-13

## 2020-08-04 RX ADMIN — MORPHINE SULFATE 2 MG: 2 INJECTION, SOLUTION INTRAMUSCULAR; INTRAVENOUS at 05:14

## 2020-08-04 RX ADMIN — ONDANSETRON 4 MG: 2 INJECTION INTRAMUSCULAR; INTRAVENOUS at 14:04

## 2020-08-04 RX ADMIN — HEPARIN SODIUM 5000 UNITS: 5000 INJECTION INTRAVENOUS; SUBCUTANEOUS at 12:41

## 2020-08-04 RX ADMIN — MORPHINE SULFATE 2 MG: 2 INJECTION, SOLUTION INTRAMUSCULAR; INTRAVENOUS at 12:41

## 2020-08-04 RX ADMIN — Medication 10 ML: at 05:14

## 2020-08-04 RX ADMIN — SODIUM CHLORIDE, POTASSIUM CHLORIDE, SODIUM LACTATE AND CALCIUM CHLORIDE: 600; 310; 30; 20 INJECTION, SOLUTION INTRAVENOUS at 12:41

## 2020-08-04 RX ADMIN — Medication 10 ML: at 09:21

## 2020-08-04 RX ADMIN — INSULIN LISPRO 1 UNITS: 100 INJECTION, SOLUTION INTRAVENOUS; SUBCUTANEOUS at 21:55

## 2020-08-04 RX ADMIN — SODIUM BICARBONATE: 84 INJECTION, SOLUTION INTRAVENOUS at 00:03

## 2020-08-04 RX ADMIN — Medication 10 ML: at 21:18

## 2020-08-04 RX ADMIN — CEFTRIAXONE 1 G: 1 INJECTION, POWDER, FOR SOLUTION INTRAMUSCULAR; INTRAVENOUS at 09:20

## 2020-08-04 RX ADMIN — HEPARIN SODIUM 5000 UNITS: 5000 INJECTION INTRAVENOUS; SUBCUTANEOUS at 21:30

## 2020-08-04 ASSESSMENT — PAIN SCALES - PAIN ASSESSMENT IN ADVANCED DEMENTIA (PAINAD)
CONSOLABILITY: 0
CONSOLABILITY: 0
FACIALEXPRESSION: 1
BODYLANGUAGE: 1
BODYLANGUAGE: 0
BREATHING: 1
TOTALSCORE: 0
NEGVOCALIZATION: 1
FACIALEXPRESSION: 0
BREATHING: 0
BODYLANGUAGE: 0
NEGVOCALIZATION: 0
TOTALSCORE: 5
TOTALSCORE: 0
FACIALEXPRESSION: 0
NEGVOCALIZATION: 0
CONSOLABILITY: 1
BREATHING: 0

## 2020-08-04 ASSESSMENT — PAIN SCALES - GENERAL
PAINLEVEL_OUTOF10: 0
PAINLEVEL_OUTOF10: 10

## 2020-08-04 NOTE — CONSULTS
Patient Name: Carla Marsh  : 3/12/1922  Age: 80 y.o. Sex: female    INDICATION FOR CONSULT: gross hematuria    History of Present Illness: 81yo F admitted earlier today after being found down at Assisted Living facility. She had catheter inserted upon presentation to ER with immediate drainage of bloody urine. Catheter upsized by nursing on floor and has been draining better since that time. Unknown if patient in urinary retention prior to cath insertion. I am unable to obtain any history from her, as she is sleeping and will not wake to discuss history / care. ALLERGY:  Allergies   Allergen Reactions    Bee Venom        HOME MEDICATIONS:  Medications Prior to Admission: FLUZONE QUADRIVALENT 0.5 ML injection, TO BE ADMINISTERED BY PHARMACIST FOR IMMUNIZATION  triamcinolone (KENALOG) 0.1 % cream, Apply topically 2 times daily.   potassium chloride (KLOR-CON M) 20 MEQ extended release tablet, Take 0.5 tablets by mouth daily  latanoprost (XALATAN) 0.005 % ophthalmic solution, Place 1 drop into both eyes nightly  lisinopril (PRINIVIL;ZESTRIL) 2.5 MG tablet, Take 2.5 mg by mouth daily  metoprolol tartrate (LOPRESSOR) 25 MG tablet, Take 25 mg by mouth 2 times daily  calcium elemental (OYSCO 500) 500 MG TABS tablet, Take 500 mg by mouth 2 times daily   solifenacin (VESICARE) 5 MG tablet, Take 5 mg by mouth daily  vitamin D (CHOLECALCIFEROL) 1000 UNIT TABS tablet, Take 2,000 Units by mouth daily  hydrALAZINE (APRESOLINE) 10 MG tablet, Take 10 mg by mouth 2 times daily as needed  polyethylene glycol (GLYCOLAX) powder, Take 17 g by mouth 2 times daily as needed  senna-docusate (PERICOLACE) 8.6-50 MG per tablet, Take 1 tablet by mouth 2 times daily as needed for Constipation  loratadine (CLARITIN) 10 MG tablet, Take 10 mg by mouth daily  aspirin 325 MG EC tablet, Take 1 tablet by mouth daily  dorzolamide-timolol (COSOPT) 22.3-6.8 MG/ML ophthalmic solution, Place 1 drop into both eyes every 12 hours  Flax OIL, status: Never Smoker    Smokeless tobacco: Never Used   Substance and Sexual Activity    Alcohol use: No    Drug use: No    Sexual activity: Not on file   Lifestyle    Physical activity     Days per week: Not on file     Minutes per session: Not on file    Stress: Not on file   Relationships    Social connections     Talks on phone: Not on file     Gets together: Not on file     Attends Rastafarian service: Not on file     Active member of club or organization: Not on file     Attends meetings of clubs or organizations: Not on file     Relationship status: Not on file    Intimate partner violence     Fear of current or ex partner: Not on file     Emotionally abused: Not on file     Physically abused: Not on file     Forced sexual activity: Not on file   Other Topics Concern    Not on file   Social History Narrative    Not on file       Review of Systems:  Unable to obtain due to patient mental status    PHYSICAL EXAM  Vitals:  /66   Pulse 92   Temp 96.3 °F (35.7 °C) (Axillary)   Resp 18   Ht 5' 2\" (1.575 m)   Wt 121 lb 0.5 oz (54.9 kg)   SpO2 95%   BMI 22.14 kg/m²   Temp  Av.6 °F (35.9 °C)  Min: 95.6 °F (35.3 °C)  Max: 98.1 °F (36.7 °C)    Intake/Output Summary (Last 24 hours) at 8/3/2020 2056  Last data filed at 8/3/2020 1847  Gross per 24 hour   Intake --   Output 950 ml   Net -950 ml       General: no acute distress; resting comfortably in hospital bed  Head: normocephalic and atraumatic  Eyes: sclera non-icteric and conjunctiva non-injected  Neck: in neck brace, immobilized  CV: regular rate and rhythm; no peripheral edema  Pulm: relaxed respirations with symmetric chest rise and stable on room air  Abd: soft; nontender and nondistended  : newman cath in place draining thin, blood tinged urine with small clots on manual irrigation  Skin: warm and dry; no rashes  Neuro: sleeping, but moves extremities       DATA  LABS  WBC:    Lab Results   Component Value Date    WBC 4.2 2020 Hemoglobin/Hematocrit:    Lab Results   Component Value Date    HGB 10.6 08/03/2020    HCT 32.1 08/03/2020     BMP:    Lab Results   Component Value Date     08/03/2020    K 5.1 08/03/2020     08/03/2020    CO2 14 08/03/2020    BUN 76 08/03/2020    LABALBU 3.5 08/03/2020    CREATININE 4.6 08/03/2020    CALCIUM 9.0 08/03/2020    GFRAA 11 08/03/2020    GFRAA >60 01/05/2012    LABGLOM 9 08/03/2020     PT/INR:    Lab Results   Component Value Date    PROTIME 12.0 08/03/2020    INR 1.03 08/03/2020     PTT:    Lab Results   Component Value Date    APTT 32.9 10/17/2018   [APTT    CULTURES:  URINE CULTURE  Results for orders placed or performed in visit on 01/07/20   Urine Culture   Result Value Ref Range    clinical report DELETED        BLOOD CULTURE  No results found for this or any previous visit. IMAGING:  RENAL ULTRASOUND 8/3/20: Personally reviewed and interpreted. Reveals bilateral hydro. Bladder decompressed around newman catheter      IMPRESSION:  79yo F with gross hematuria following catheter insertion after a fall at home. Renal sono also with bilateral hydronephrosis    PLAN:  1) Gross hematuria:  -Unclear etiology. Catheter draining and with minimal clots. Could be rapid decompression? Unclear if she was retaining while down? Manually irrigate catheter Qshift and PRN. Monitor    2) Bilateral hydro: Get CT scan non contrast to eval.  If no clear obstructive source, could be related to retention? Can monitor creatinine with cath in place.     Will cont to follow    Natty Waller MD  8/3/2020

## 2020-08-04 NOTE — PROGRESS NOTES
Speech Language Pathology    Re-attempted to see pt for swallow evaluation this date. Spoke with RN; pt remains obtunded. Will re-attempt at later time/date as appropriate.     Nilson Mueller M.A., [place] [phone]  Speech-Language Pathologist  Pager: [phone]

## 2020-08-04 NOTE — CARE COORDINATION
per family pt was mainly in a w/c but did do some walking with a walker. Pt became confused over the past week. Pt was found down in her assisted living apt. Pt has Cervical fracture. May need SNF placement. Precert needed. PT/OT pending. Addendum:15:00pm AVA spoke to daughter Lita Morin who stated she would rather staff speak with her brother Ya Clinton who is POA. Ya Clinton in conference call until 4:30pm , CM left message. AVA called to talk with [facility] and spoke with Ming Mann who stated she would have someone from Nursing get back with CM regarding COVID policy and level of care pt will need to be at to return. AAV received return phone call from Machias at [facility], she is asking if we could retest the pt as the pt never leaves her room, has no visitors and only allows one aide in to help her. AVA perfect served Dr Magda Rhodes, who stated he would repeat the Covid PCR test.    The Plan for Transition of Care is related to the following treatment goals of Acute metabolic encephalopathy [G60.48]    The Patient and/or patient representative Catarina Granda and her family were provided with a choice of provider and agrees with the discharge plan Yes    Freedom of choice list was provided with basic dialogue that supports the patient's individualized plan of care/goals and shares the quality data associated with the providers.  Yes    Care Transition patient: No    Hema Carranza RN  [facility]  Case Management Department  Ph: [phone]   Fax: [phone]

## 2020-08-04 NOTE — CONSULTS
Infectious Diseases Inpatient Consult Note    RESIDENT NOTE - reviewed / edited, attending note at bottom    Reason for Consult:   COVID-19, UTI vs bacteriuria  Requesting Physician:   Davide Amaral MD  Primary Care Physician:  Jennifer Rangel MD  History Obtained From:   Pt, EPIC    Admit Date: 8/3/2020  Hospital Day: 2    CHIEF COMPLAINT:    Found down after fall. 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 Pt was subsequently transferred to [facility] at recommendation of Dr Marquis Stratton. ID consulted for COVID -19 management. 08/03: In the ED: HDS,   UA with large leukocytes, negative for nitrites, large blood and trace of ketones. CBC with WBC of 6.8, Hgb 11.8. CMP with Na 131, K 6.4, BUN 78 creatinine 5.3, Trop 0.22, CO2 11. EKG NSR.   CXR with no acute disease. X-ray left knee with no fracture or malalignment. X-ray hip with no evidence of acute finding in the pelvis. CT head and cervical spine with small right frontal scalp contusion. Comminuted displaced left occipital condyle and C1 lateral mass fracture. Age-related cerebral volume loss. Patient was last seen well by assisted nurse at 10:00 in the morning 08/02. At midnight, pt was found on the floor. Pt denied any LOC but patient seemed confused per chart notes. According to Pt son and daughter in law, pt seemed more dehydrated and not herself the past week and that she is normally alert and oriented. Pt usually gets around with wheelchair but ambulates a little with walker. 08/04: Pt was alert but unable to assess orientation. Pt had her L hearing aid put in by nurse but pt is still hard of hearing. Patient is still complaining of pain but hard to know where. Patient not answering any questions clearly. Additionally, it was very hard to hear pt's low voice with my PAPR on.       Past Medical History:    Past Medical History:   Diagnosis Date    Carcinoma in situ of colon 1998    Diarrhea     Esophageal reflux     Other extrapyramidal disease and abnormal movement disorder     Pernicious anemia     S/P colonoscopy 4/09    OK - no further eval.    Unspecified cataract     Unspecified hypothyroidism     Unspecified pruritic disorder        Past Surgical History:    Past Surgical History:   Procedure Laterality Date    COLECTOMY      partial    COLONOSCOPY  4/09    OK - no further eval    HYSTERECTOMY         Current Medications:     heparin (porcine)  5,000 Units Subcutaneous 3 times per day    levothyroxine  125 mcg Oral Daily    sodium chloride flush  10 mL Intravenous 2 times per day    cefTRIAXone (ROCEPHIN) IV  1 g Intravenous Q24H    insulin lispro  0-6 Units Subcutaneous TID WC    insulin lispro  0-3 Units Subcutaneous Nightly       Allergies:  Bee venom    Social History:    TOBACCO:    Never  ETOH:    None  DRUGS:   Never  MARITAL STATUS:     OCCUPATION:       Patient lives at [facility]    Family History:   No immunodeficiency    REVIEW OF SYSTEMS:    Could not get any answers from patient. PHYSICAL EXAM:      Vitals:    /85   Pulse 105   Temp 96.2 °F (35.7 °C) (Oral)   Resp 18   Ht 5' 2\" (1.575 m)   Wt 121 lb 0.5 oz (54.9 kg)   SpO2 94%   BMI 22.14 kg/m²     GENERAL: No apparent distress.     HEENT: Membranes moist, no oral lesion, PERRL  NECK:  Supple, no lymphadenopaty  LUNGS: Clear b/l, no rales, no dullness  CARDIAC: RRR, no murmur appreciated  ABD:  + BS, soft / NT  EXT:  No rash, no edema, no lesions  NEURO: No focal neurologic findings  PSYCH: Could not assess orientation, sensorium, mood normal  LINES:  Peripheral iv (Left arm), Urethral catheter    DATA:    Lab Results   Component Value Date    WBC 4.7 08/04/2020    HGB 11.0 (L) 08/04/2020    HCT 32.6 (L) 08/04/2020    MCV 85.2 08/04/2020     (L) 08/04/2020 Lab Results   Component Value Date    CREATININE 3.3 (H) 2020    BUN 57 (H) 2020     2020    K 4.9 2020     2020    CO2 28 2020       Hepatic Function Panel:   Lab Results   Component Value Date    ALKPHOS 61 2020    ALT 8 2020    AST 27 2020    PROT 5.7 2020    PROT 7.3 2012    BILITOT 0.3 2020    LABALBU 3.1 2020       Micro:  :  Candace positive  Urine culture: Klebsiella pneumoniae, 50,000 CFU/ml    Imagin/3 CXR - no acute d      IMPRESSION:      Patient Active Problem List   Diagnosis    Esophageal reflux    Hypothyroidism    Carcinoma in situ of colon    Cataract    Allergic rhinitis    Pernicious anemia    Menopausal symptoms    Onychomycosis    Elevated blood pressure reading    Leukopenia    Edema    Shoulder pain, bilateral    Pain in both feet    Heart murmur    Impacted cerumen of both ears    Skin lesion    Closed bimalleolar fracture of right ankle    Bimalleolar ankle fracture, left, closed, initial encounter    Acute metabolic encephalopathy       COVID-19  - No O2 requirments  - afebrile  -elevated D-dimer (656), LDH (423), ferritin (347)  UTI  -Klebsiella pneumoniae  -on IV rocephin  Acute Metabolic Encephalopathy  -possibly 2/2 to UTI, renal failure  -improving  JUANCARLOS  -PVR 900ml  -Cr 5.3 to 3.3  Metabolic acidosis  Hyperkalemia  Hematuria   -possibly 2/2 traumatic newman insertion vs retaining while down.  -renal US with B/L hydronephrosis  Troponemia      RECOMMENDATIONS:    Possible change to meropenem for UTI. Will discuss with Dr Jareth Malik. F/u sensitivities  For COVID-19, pt sating 94-97% on room air, afebrile. No specific treatment necessary at this time. Per neurosurgery:  No surgical intervention given age and location of fracture. Signed off. Per nephro: Will get CT non contrast to evaluate B/L hydronephrosis for posible obstructive source.     Will discuss with

## 2020-08-04 NOTE — PROGRESS NOTES
Physical Therapy    Facility/Department: [place] [address] [facility]  Initial Assessment    NAME: Laurence Caro  : 3/12/1922  MRN: [id number]    Date of Service: 2020    Discharge Recommendations:Candi Cha scored a 6/24 on the AM-PAC short mobility form. Current research shows that an AM-PAC score of 17 or less is typically not associated with a discharge to the patient's home setting. Based on the patient's AM-PAC score and their current functional mobility deficits, it is recommended that the patient have 3-5 sessions per week of Physical Therapy at d/c to increase the patient's independence. Please see assessment section for further patient specific details. If patient discharges prior to next session this note will serve as a discharge summary. Please see below for the latest assessment towards goals. PT Equipment Recommendations  Equipment Needed: No(defer to next level of care)    Assessment   Assessment: 79 yo admitted 8/3/20 for fall/cervical fx and now in [facility]. Pt is profoundly [place] and VERY difficult to communicate with effectively; pt is also COVID positive. Pt demo mobility below her reported baseline of ALU in w/c with some ambulation with assist. Safety concerns for pt to return to ALU at this time. Per CM notes, family is potentially pursuing SNF at discharge. Pt would benefit from continued skilled IP PT at discharge and she would also benefit from palliative care consult. Treatment Diagnosis: mobility impairment due to fall/cervical fx  Decision Making: High Complexity  Patient Education: Pt educated on PT role, need to call for assist to get up and she verbalized partial understanding and will need reinforcement. REQUIRES PT FOLLOW UP: Yes  Activity Tolerance  Activity Tolerance: Patient limited by endurance; Patient limited by cognitive status       Patient Diagnosis(es): There were no encounter diagnoses.      has a past medical history of Carcinoma in situ of colon, Diarrhea, supervision  Short term goal 3: independent B LE HEP x10 reps  Patient Goals   Patient goals : did not state       Therapy Time   Individual Concurrent Group Co-treatment   Time In 1310         Time Out 1405         Minutes 55           Timed Code Treatment Minutes:45       Total Treatment Minutes:  [address] [facility],

## 2020-08-04 NOTE — PROGRESS NOTES
Following secure message sent to house MD:    Patient admitted with fall C-Spine fracture. Patient COVID positive. Patient in MRI now. May need sedative to complete exam.  Patient confused and restless.     Electronically signed by Patel Adair RN on 8/3/2020 at 9:22 PM

## 2020-08-04 NOTE — PLAN OF CARE
Problem: Nutrition Deficits  Goal: Optimize nutrtional status  Outcome: Ongoing   Patient unable to take fluids by mouth this shift. RN attempted to give patient a sip of water, and the patient coughed and had some gurgling noises. Patient states \"I Cant swallow\" multiple times, patient assisted to use mouth swabs. Per recommendations from speech, spoon fulls of water and ice chips for now.

## 2020-08-04 NOTE — PLAN OF CARE
Patient able to maintain a patent airway at this time. Pain/discomfort being managed with PRN analgesics per MD orders. Pt able to express presence and absence of pain and rate pain appropriately using numerical scale. Pt free from falls this shift. Fall precautions in place at all times. Call light always within reach. Pt able and agreeable to contact for safety appropriately.

## 2020-08-04 NOTE — PROGRESS NOTES
Occupational Therapy/Physical Therapy    Hold      Orders received, chart review complete. Spoke with RN who requests therapy hold at this time, as pt is not awake or alert enough to participate. Will re-attempt later this date as schedule allows. RN aware.          RACHEL Sánchez, PT

## 2020-08-04 NOTE — PLAN OF CARE
Completed swallowing evaluation. Please refer to EMR.     Jessica Moncada M.A., Antoinette [address] 89  Speech-Language Pathologist

## 2020-08-04 NOTE — PROGRESS NOTES
Hospitalist Progress Note      PCP: Belén Fabian MD    Date of Admission: 8/3/2020    Chief Complaint: Fall, [facility] Course: Patient is 24-year-old female resident of assisted living history of hypothyroid, hypertension presented to [facility] ED after she had a fall. On my evaluation patient is confused so most of the history was obtained from the charts and talking to family over the phone Karmen Chau and Jonny Hartley. Per ED notes  Patient was last seen at 10:00 by the assisted living nurse. She was sitting in a chair when they came back to see her at 12 midnight they found her on the floor. Complain of left hip and knee pain.     On arrival to ED hemodynamically stable. Blood work significant for creatinine 5.3, CO2 11, potassium 6.4 troponin 0 0.22. EKG normal sinus rhythm with some PACs.     Imaging including CT head, cervical spine small right frontal scalp contusion. Comminuted displaced left occipital condyle and C1 lateral mass fracture. Prominence of the CSF space along the right convexity may related to patient positioning as opposed to subdural hygroma. Age-related cerebral volume loss. X-ray left knee no fracture or malalignment. X-ray hip no evidence of acute finding in the pelvis. Subjective: Seen and examined today. Slow to respond or return to x1 she appears to be confused. Denies any pain. COVID-19 positive. Hematuria clearing up. No acute event reported overnight.       Medications:  Reviewed    Infusion Medications    lactated ringers      dextrose       Scheduled Medications    heparin (porcine)  5,000 Units Subcutaneous 3 times per day    levothyroxine  125 mcg Oral Daily    sodium chloride flush  10 mL Intravenous 2 times per day    cefTRIAXone (ROCEPHIN) IV  1 g Intravenous Q24H    insulin lispro  0-6 Units Subcutaneous TID     insulin lispro  0-3 Units Subcutaneous Nightly     PRN Meds: acetaminophen **OR** acetaminophen, sodium chloride flush, acetaminophen **OR** acetaminophen, polyethylene glycol, promethazine **OR** ondansetron, [Held by provider] magnesium sulfate, glucose, dextrose, glucagon (rDNA), dextrose, perflutren lipid microspheres, morphine      Intake/Output Summary (Last 24 hours) at 8/4/2020 1205  Last data filed at 8/4/2020 9456  Gross per 24 hour   Intake 1547.33 ml   Output 925 ml   Net 622.33 ml       Physical Exam Performed:    /85   Pulse 105   Temp 96.2 °F (35.7 °C) (Oral)   Resp 18   Ht 5' 2\" (1.575 m)   Wt 121 lb 0.5 oz (54.9 kg)   SpO2 94%   BMI 22.14 kg/m²     General appearance: Confused not in apparent distress  HEENT: Pupils equal, round, and reactive to light. Conjunctivae/corneas clear. Neck: Susanne J collar present  Respiratory:  Normal respiratory effort. Clear to auscultation, bilaterally without Rales/Wheezes/Rhonchi. Cardiovascular: Regular rate and rhythm with normal S1/S2 without murmurs, rubs or gallops. Abdomen: Soft, non-tender, non-distended with normal bowel sounds. Musculoskeletal: No clubbing, cyanosis or edema bilaterally. Full range of motion without deformity. Skin: Skin color, texture, turgor normal.  No rashes or lesions. Neurologic:  Neurovascularly intact without any focal sensory/motor deficits.  Cranial nerves: II-XII intact, grossly non-focal.  Psychiatric: Alert and oriented to self  Capillary Refill: Brisk,< 3 seconds   Peripheral Pulses: +2 palpable, equal bilaterally       Labs:   Recent Labs     08/03/20  0226 08/03/20  1005 08/04/20  0830   WBC 6.8 4.2 4.7   HGB 11.8* 10.6* 11.0*   HCT 36.1 32.1* 32.6*    137 132*     Recent Labs     08/03/20  0333 08/03/20  1005 08/04/20  0830   * 134* 137   K 6.1* 5.1 4.9    106 101   CO2 12* 14* 28   BUN 78* 76* 57*   CREATININE 5.2* 4.6* 3.3*   CALCIUM 9.2 9.0 8.5   PHOS  --   --  3.1     Recent Labs     08/03/20  0226 08/03/20  1005   AST 21 23   ALT 9* 8*   BILITOT 0.4 0.4   ALKPHOS 65 61     Recent Labs     08/03/20  1005   INR 1.03 Recent Labs     08/03/20  1005  08/03/20  1843 08/04/20  0015 08/04/20  0830   CKTOTAL 182  --   --   --   --    TROPONINI  --    < > 0.29* 0.28* 0.26*    < > = values in this interval not displayed. Urinalysis:      Lab Results   Component Value Date    NITRU Negative 08/03/2020    WBCUA 335 08/03/2020    BACTERIA Rare 01/07/2020    RBCUA 77 08/03/2020    BLOODU LARGE 08/03/2020    SPECGRAV 1.013 08/03/2020    GLUCOSEU Negative 08/03/2020    GLUCOSEU NEGATIVE 05/17/2010       Radiology:  MRA NECK WO CONTRAST   Final Result   1. No evidence of acute infarct or other acute intracranial abnormality. 2. Moderate global volume loss with moderate chronic small vessel ischemic disease within the periventricular white matter. MRA NECK:      FINDINGS: The vertebral arteries are patent bilaterally and equal in size. No evidence of vertebral artery occlusion. Both vertebral arteries contribute to the basilar artery. The common carotid arteries are patent bilaterally. The internal and external carotid arteries are patent bilaterally. There is no high-grade stenosis. There is no evidence of vessel occlusion. IMPRESSION:   1. Patent carotid and vertebral arteries. No evidence of acute dissection or occlusion. MRI BRAIN WO CONTRAST   Final Result   1. No evidence of acute infarct or other acute intracranial abnormality. 2. Moderate global volume loss with moderate chronic small vessel ischemic disease within the periventricular white matter. MRA NECK:      FINDINGS: The vertebral arteries are patent bilaterally and equal in size. No evidence of vertebral artery occlusion. Both vertebral arteries contribute to the basilar artery. The common carotid arteries are patent bilaterally. The internal and external carotid arteries are patent bilaterally. There is no high-grade stenosis. There is no evidence of vessel occlusion. IMPRESSION:   1. Patent carotid and vertebral arteries.  No evidence of acute dissection or occlusion. CT ABDOMEN PELVIS WO CONTRAST Additional Contrast? None   Final Result      1. Mild bilateral hydronephrosis with ureters dilated to the level of the urinary bladder of uncertain etiology. 2. Collapsed urinary bladder with Cha catheter in place. There is suggestion of diffuse bladder wall thickening. Further evaluation with cystoscopy may be indicated. 3. Moderate age-indeterminate compression fracture of the L1 vertebral body. US RENAL COMPLETE   Final Result      Mild left hydronephrosis and mild right hydroureter. No ultrasound evidence of a renal calculus. If the patient is symptomatic for renal colic, need for further evaluation with CT abdomen can be determined clinically. Assessment/Plan:    Active Hospital Problems    Diagnosis Date Noted    Acute metabolic encephalopathy [X35.16] 08/03/2020     #COVID-19 pneumonia  Will check inflammatory markers CRP, ferritin, d-dimer  Patient is noted respiratory distress. Will hold off any dexamethasone. Consulted ID. #Acute metabolic encephalopathy: Slightly improved  Multifactorial UTI, renal failure    #JUANCARLOS multifactorial prerenal and postrenal  PVR was reported to be 900 cc  Cr 5.3>3.3  Discussed with nephro  Renal ultrasound mild left hydronephrosis and mild right hydroureter. #UTI with Klebsiella  Continue IV Rocephin day 2. Follow-up with sensitivities. #Hematuria of unclear etiology. Consult from urology reviewed recommended continue Cha irrigation every shift. Catheter bag is a still hematuria. #Elevated troponin in setting of JUANCARLOS. Troponin trending down. We will obtain an echocardiogram to assess baseline. #Fall unsure mechanical versus other reason. Follow-up on echocardiogram.  MRA and MRI reviewed no aneurysm or stroke.     #Traumatic comminuted and displaced left occipital condyle and C1 left lateral mass fracture  Neurosurgery consult reviewed and appreciated  [place] J collar to be worn at all times  Cervical collar does NOT need to be worn when eating, bathing or showering  Cervical collar pads to be cleaned with soap & water, air dried, and changed daily  Continue IV morphine for pain control. Will transition to oral once swallow eval completed. PT/OT/ST evaluation. DVT Prophylaxis: Subcu heparin  Diet: Diet NPO, After Midnight  Code Status: Limited    PT/OT Eval Status: Consulted    Dispo -inpatient. Continue IVF monitor renal panel ID recommendations.     Kisha Coon MD

## 2020-08-04 NOTE — PROGRESS NOTES
Speech Language Pathology  Facility/Department: [place] [address] [facility]   CLINICAL BEDSIDE SWALLOW EVALUATION    NAME: Sofie Hare  : 3/12/1922  MRN: [id number]    ADMISSION DATE: 8/3/2020  ADMITTING DIAGNOSIS: has Esophageal reflux; Hypothyroidism; Carcinoma in situ of colon; Cataract; Allergic rhinitis; Pernicious anemia; Menopausal symptoms; Onychomycosis; Elevated blood pressure reading; Leukopenia; Edema; Shoulder pain, bilateral; Pain in both feet; Heart murmur; Impacted cerumen of both ears; Skin lesion; Closed bimalleolar fracture of right ankle; Bimalleolar ankle fracture, left, closed, initial encounter; and Acute metabolic encephalopathy on their problem list.  ONSET DATE: 2020    Recent Chest Xray: (2020)  Impression    No acute disease. Recent MRI Brain & MRA Neck: (2020)  Impression    1. No evidence of acute infarct or other acute intracranial abnormality. 2. Moderate global volume loss with moderate chronic small vessel ischemic disease within the periventricular white matter.         MRA NECK:         FINDINGS: The vertebral arteries are patent bilaterally and equal in size. No evidence of vertebral artery occlusion. Both vertebral arteries contribute to the basilar artery.         The common carotid arteries are patent bilaterally. The internal and external carotid arteries are patent bilaterally. There is no high-grade stenosis. There is no evidence of vessel occlusion.         IMPRESSION:    1. Patent carotid and vertebral arteries. No evidence of acute dissection or occlusion. Date of Eval: 2020  Evaluating Therapist: Candi Lay    Current Diet level:  Current Diet : NPO  Current Liquid Diet : NPO      Primary Complaint  Patient Complaint: \"I can't swallow. \"    Pain:  Pain Assessment  Pain Assessment: Advanced Dementia  Pain Level: 10  Patient's Stated Pain Goal: 2  Pain Type: Acute pain  Pain Location: Neck, Head  Pain Orientation: Mid  Pain Descriptors: Sore  Pain Frequency: Intermittent  Pain Onset: On-going  Clinical Progression: Not changed  Functional Pain Assessment: Prevents or interferes with many active not passive activities  Non-Pharmaceutical Pain Intervention(s): Rest, Relaxation techniques  Response to Pain Intervention: Asleep with RR greater than 10(RR 17)  PAINAD (Pain Assessment in Advance Dementia)  Breathing: occasional labored breathing, short period of hyperventilation  Negative Vocalization: occasional moan/groan, low speech, negative/disapproving quality  Facial Expression: sad, frightened, frown  Body Language: tense, distressed pacing, fidgeting  Consolability: distracted or reassured by voice/touch  PAINAD Score: 5    Reason for Referral  Jameson Tafoya was referred for a bedside swallow evaluation to assess the efficiency of her swallow function, identify signs and symptoms of aspiration and make recommendations regarding safe dietary consistencies, effective compensatory strategies, and safe eating environment. Impression  Dysphagia Diagnosis: Moderate to severe pharyngeal stage dysphagia  Dysphagia Impression : Patient presents with suspected moderate-severe pharyngeal stage dysphagia. Analyzed swallow function with trials of ice chips and thin liquid via tsp. Pt with prolonged mastication, impaired swallow initiation/limited laryngeal elevation, wet vocal quality. No overt s/s aspiration noted. Further assessment limited by pt's nausea. Recommend continue NPO, oral hygiene, small amounts ice chips / thin liquid via tsp. Consider short-temr alternative means nutrition/hydration. Will continue to follow. Dysphagia Outcome Severity Scale: Level 1: Severe dysphagia- NPO. Unable to tolerate any PO safely     Treatment Plan  Requires SLP Intervention: Yes  Duration/Frequency of Treatment: 3-5x/wk for LOS  D/C Recommendations:  To be determined       Recommended Diet and Intervention  Diet Solids Recommendation: NPO  Liquid Consistency Recommendation: NPO     Recommendations: NPO;Consider alternative nutrition;Consider ice chips PRN;Dysphagia treatment  Therapeutic Interventions: Oral care; Patient/Family education; Therapeutic PO trials with SLP    Compensatory Swallowing Strategies  N/A    Treatment/Goals  Short-term Goals  Timeframe for Short-term Goals: 1-2 wks or LOS  Goal 1: Patient will tolerate repeat BSE. Goal 2: Patient/caregiver will demonstrate understanding of compensatory strategies/recommendations for improved swallowing safety/function. General  Chart Reviewed: Yes  Comments: Per admitting H&P (08/03/2020): 'Patient is 60-year-old female resident of assisted living history of hypothyroid, hypertension presented to [facility] ED after she had a fall. On my evaluation patient is confused so most of the history was obtained from the charts and talking to family over the phone Tami Segura and Carlton Levy. Family saw patients yesterday at assisted living they also reported that patient was looking dehydrated. And for last couple of days she was not within herself. Per ED notes  Patient was last seen at 10:00 by the assisted living nurse. She was sitting in a chair when they came back to see her at 12 midnight they found her on the floor. Complain of left hip and knee pain. Imaging including CT head, cervical spine small right frontal scalp contusion. Comminuted displaced left occipital condyle and C1 lateral mass fracture.'  Subjective  Subjective: Received pt alert, resting in bed, on room air. [place]-J collar in place; per chart, able to remove during meals. Repositioned upright in bed and removed with RN's assistance. Behavior/Cognition: Alert;Confused; Requires cueing  O2 Device: None (Room air)  Communication Observation: Functional  Follows Directions: Simple  Dentition: Some missing teeth  Patient Positioning: Upright in bed  Baseline Vocal Quality: Wet;Weak  Prior Dysphagia History: None per chart review.  RN reports pt having difficulty swallowing. Consistencies Administered: Ice Chips    Vision/Hearing  Vision  Vision: Impaired  Vision Exceptions: Wears glasses at all times  Hearing  Hearing: Exceptions to [facility]  Hearing Exceptions: Hard of hearing/hearing concerns; Left hearing aid    Oral Motor Deficits  Oral/Motor  Oral Motor: Within functional limits    Prognosis  Prognosis  Prognosis for safe diet advancement: good  Barriers to reach goals: age;severity of dysphagia  Individuals consulted  Consulted and agree with results and recommendations: Patient;RN    Education  Patient Education: Patient educated re: swallow function, dysphagia, aspiration risk, recommendations. Patient Education Response: Verbalizes understanding;Needs reinforcement  Safety Devices in place: Yes  Type of devices: Left in bed;Bed alarm in place;Nurse notified;Call light within reach       Therapy Time  SLP Individual Minutes  Time In: 1430  Time Out: 1452  Minutes: 22     SLP Total Treatment Time  Timed Code Treatment Minutes: 0 Minutes  Total Treatment Time: 22    Plan  Diet Recommendations: NPO, oral hygiene, small amounts ice chips / thin liquid via tsp. Consider short-temporary alternative means nutrition/hydration. Discharge Plan:  TBD  Discussed with RNWerner. Needs within reach. Electronically Signed by:  Jessica Moncada M.A., [address]  Speech-Language Pathologist  Pager #[phone]    This document will serve as a discharge summary if pt discharges before next treatment.

## 2020-08-04 NOTE — PROGRESS NOTES
Nephrology Note                                                                                                                                                                                                                                                                                                                                                               Office : [phone]     Fax :[phone]      Patient's Name: Yuri Samuels  9:37 AM  8/4/2020    Reason for Consult:  Elevated Cr  Requesting Physician:  Socrates Duckworth MD      Chief Complaint:  Fall    History of Present Ilness:    Yuri Samuels is a 80 y.o. female with PMH significant for carcinoma in situ of colon, pernicious anemia, and hypothyroidism. She presented to [facility] ED because she fell and was found on the floor. She is complaining of left hip and knee pain, as well as pain at the back of her head but she denies loss of consciousness. Denies chest pain, abdominal pain, and numbness or tingling. Denies being on blood thinner. According to her son, she ambulates a little bit with a walker, but mainly uses a wheelchair. He reports that she has been a little more confused in the last week. 8/4: Pt not seen today due to recent COVID diagnosis to limit exposure. Per nurse, pt still making plenty of urine and the color has improved, now a light pink and with decreased clots. Pt is opening eyes more often now, had her L hearing aid put in by nurse and was able to deny and feelings of pain and told the nurse her name.        Past Medical History:   Diagnosis Date    Carcinoma in situ of colon 1998    Diarrhea     Esophageal reflux     Other extrapyramidal disease and abnormal movement disorder     Pernicious anemia     S/P colonoscopy 4/09    OK - no further eval.    Unspecified cataract     Unspecified hypothyroidism     Unspecified pruritic disorder        Past Surgical History:   Procedure Laterality Date    COLECTOMY      partial    COLONOSCOPY  4/09    OK - no further eval    HYSTERECTOMY         No family history on file. reports that she has never smoked. She has never used smokeless tobacco. She reports that she does not drink alcohol or use drugs. Allergies:  Bee venom    Current Medications:    levothyroxine (SYNTHROID) tablet 125 mcg, Daily  sodium chloride flush 0.9 % injection 10 mL, 2 times per day  sodium chloride flush 0.9 % injection 10 mL, PRN  acetaminophen (TYLENOL) tablet 650 mg, Q6H PRN    Or  acetaminophen (TYLENOL) suppository 650 mg, Q6H PRN  polyethylene glycol (GLYCOLAX) packet 17 g, Daily PRN  promethazine (PHENERGAN) tablet 12.5 mg, Q6H PRN    Or  ondansetron (ZOFRAN) injection 4 mg, Q6H PRN  [Held by provider] magnesium sulfate 2 g in 50 mL IVPB premix, PRN  cefTRIAXone (ROCEPHIN) 1 g IVPB in 50 mL D5W minibag, Q24H  insulin lispro (1 Unit Dial) 0-6 Units, TID WC  insulin lispro (1 Unit Dial) 0-3 Units, Nightly  glucose (GLUTOSE) 40 % oral gel 15 g, PRN  dextrose 50 % IV solution, PRN  glucagon (rDNA) injection 1 mg, PRN  dextrose 5 % solution, PRN  sodium bicarbonate 150 mEq in dextrose 5 % 1,000 mL infusion, Continuous  perflutren lipid microspheres (DEFINITY) injection 1.65 mg, ONCE PRN  morphine (PF) injection 2 mg, Q4H PRN        Review of Systems:   14 point ROS obtained but were negative except mentioned in HPI      Physical exam:     Vitals:  /73   Pulse 101   Temp 95.5 °F (35.3 °C) (Axillary)   Resp 17   Ht 5' 2\" (1.575 m)   Wt 121 lb 0.5 oz (54.9 kg)   SpO2 96%   BMI 22.14 kg/m²   Due to limiting exposure to COVID-19, patient was not interviewed or examined, at this time. I have personally reviewed the reports and images of labs, radiological studies, current and old medical records.      Data:   Labs:  CBC:   Recent Labs     08/03/20  0226 08/03/20  1005   WBC 6.8 4.2   HGB 11.8* 10.6*    137     BMP:    Recent Labs     08/03/20  0226 08/03/20  0338 08/03/20  1005   * 133* 134*   K 6.4* 6.1* 5.1    103 106   CO2 11* 12* 14*   BUN 78* 78* 76*   CREATININE 5.3* 5.2* 4.6*   GLUCOSE 125* 121* 131*     Ca/Mg/Phos:   Recent Labs     08/03/20  0226 08/03/20  0333 08/03/20  1005   CALCIUM 9.2 9.2 9.0     Hepatic:   Recent Labs     08/03/20  0226 08/03/20  1005   AST 21 23   ALT 9* 8*   BILITOT 0.4 0.4   ALKPHOS 65 61     Troponin:   Recent Labs     08/03/20  1303 08/03/20  1843 08/04/20  0015   TROPONINI 0.36* 0.29* 0.28*     BNP: No results for input(s): BNP in the last 72 hours. Lipids: No results for input(s): CHOL, TRIG, HDL, LDLCALC, LABVLDL in the last 72 hours. ABGs: No results for input(s): PHART, PO2ART, FVZ8YVS in the last 72 hours. INR:   Recent Labs     08/03/20  1005   INR 1.03     UA:  Recent Labs     08/03/20  0338   COLORU YELLOW   CLARITYU CLOUDY*   GLUCOSEU Negative   BILIRUBINUR Negative   KETUA TRACE*   SPECGRAV 1.013   BLOODU LARGE*   PHUR 5.5   PROTEINU 100*   UROBILINOGEN 0.2   NITRU Negative   LEUKOCYTESUR LARGE*   LABMICR YES   URINETYPE NotGiven      Urine Microscopic:   Recent Labs     08/03/20  0338   COMU see below   HYALCAST 0   WBCUA 335*   RBCUA 77*   EPIU 0     Urine Culture: No results for input(s): LABURIN in the last 72 hours. Urine Chemistry:   Recent Labs     08/03/20  1410   LABCREA 74.4   PROTEINUR 435.00*   NAUR 87         IMAGING:  MRA NECK WO CONTRAST   Final Result   1. No evidence of acute infarct or other acute intracranial abnormality. 2. Moderate global volume loss with moderate chronic small vessel ischemic disease within the periventricular white matter. MRA NECK:      FINDINGS: The vertebral arteries are patent bilaterally and equal in size. No evidence of vertebral artery occlusion. Both vertebral arteries contribute to the basilar artery. The common carotid arteries are patent bilaterally. The internal and external carotid arteries are patent bilaterally. There is no high-grade stenosis. There is no evidence of vessel occlusion. IMPRESSION:   1. Patent carotid and vertebral arteries. No evidence of acute dissection or occlusion. MRI BRAIN WO CONTRAST   Final Result   1. No evidence of acute infarct or other acute intracranial abnormality. 2. Moderate global volume loss with moderate chronic small vessel ischemic disease within the periventricular white matter. MRA NECK:      FINDINGS: The vertebral arteries are patent bilaterally and equal in size. No evidence of vertebral artery occlusion. Both vertebral arteries contribute to the basilar artery. The common carotid arteries are patent bilaterally. The internal and external carotid arteries are patent bilaterally. There is no high-grade stenosis. There is no evidence of vessel occlusion. IMPRESSION:   1. Patent carotid and vertebral arteries. No evidence of acute dissection or occlusion. CT ABDOMEN PELVIS WO CONTRAST Additional Contrast? None   Final Result      1. Mild bilateral hydronephrosis with ureters dilated to the level of the urinary bladder of uncertain etiology. 2. Collapsed urinary bladder with Cha catheter in place. There is suggestion of diffuse bladder wall thickening. Further evaluation with cystoscopy may be indicated. 3. Moderate age-indeterminate compression fracture of the L1 vertebral body. US RENAL COMPLETE   Final Result      Mild left hydronephrosis and mild right hydroureter. No ultrasound evidence of a renal calculus. If the patient is symptomatic for renal colic, need for further evaluation with CT abdomen can be determined clinically. Assessment/Plan   1.  JUANCARLOS - secondary to obstruction/urinary retention/Hypovolemia   - discontinue sodium bicarbonate, start LR 50mL/hr  - Uprotein 435, Margarita 87, FeNa 4.0%, postrenal/obstructive JUANCARLOS  - uric acid elevated at 9.5, CK normal, rhabdo ruled out  - renal U/S revealed mild L hydronephrosis and mild R hydroureter  - follow up CT abd/pelvis wo contrast revealed mild bilateral hydronephrosis with dilated ureters as well as diffuse bladder wall thickening  - bladder scan > 930 after voiding 250 on 8/3, urinary retention  - urinary cath     2. Anion gap metabolic acidosis  - resolved    3. Acute metabolic encephalopathy  - likely secondary to UTI  - continue rocephin IVPB  - per hospitalist    4. Occipital condyle & C1 fracture  - follow neurosurgery recs  - MRI brain and neck wo contrast performed, no acute infarct and patent vessels without dissection  - [place] J collar worn at all times, cervical collar may be removed for eating/cleaning    5. Bilateral hydronephrosis  - urology following, likely secondary to urinary retention    6. Gross hematuria  - urology following, may be 2/2 to rapid decompression  - monitor and manually irrigate cath q shift and prn    7.  COVID 19 infection  - per hospitalist      Thank you for allowing us to participate in care of [place] free to contact me   [facility] [address]  Office : [phone]  Fax :[phone]    Agree with plan above     Tre Stoll

## 2020-08-04 NOTE — PROGRESS NOTES
Updating Glenys Paz, family member, with patient status and status of MRI.     Electronically signed by Clinton Johnson RN on 8/3/2020 at 10:48 PM

## 2020-08-04 NOTE — PROGRESS NOTES
Occupational Therapy    Initial Assessment and Treatment  Date: 2020   Patient Name: Juan Daniel Madrigal  MRN: [id number]     : 3/12/1922    Date of Service: 2020    Discharge Recommendations: Juan Daniel Madrigal scored a 10/24 on the AM-PAC ADL Inpatient form. Current research shows that an AM-PAC score of 17 or less is typically not associated with a discharge to the patient's home setting. Based on the patient's AM-PAC score and their current ADL deficits, it is recommended that the patient have 3-5 sessions per week of Occupational Therapy at d/c to increase the patient's independence. Please see assessment section for further patient specific details. OT Equipment Recommendations  Equipment Needed: No    Assessment   Performance deficits / Impairments: Decreased endurance;Decreased functional mobility ; Decreased ADL status; Decreased posture;Decreased strength;Decreased cognition    Assessment: Pt's functional independence is significantly below baseline. Pt is limited by extreme anxiety and being fearful of falling with any type of movement. She is resistive to transferring supine > sit, and even adjusting the bed settings. Pt is also very hard of hearing and communicating and educating pt is challenging. She has [place] J collar in place. Currently has poor tolerance for therapy. Hopefully her pain and anxiety and fear will subside and she will be able to participate in further therapy. Will continue to follow.     Treatment Diagnosis: Impaired ADLs, mobility, activity tolerance    Patient Education: Role of OT - no learning noted    REQUIRES OT FOLLOW UP: Yes    Activity Tolerance: Patient limited by pain  Activity Tolerance: and being very fearful of falling    Safety Devices in place: Yes  Type of devices: Left in bed;Bed alarm in place;Call light within reach;Nurse notified             Treatment Diagnosis: Impaired ADLs, mobility, activity tolerance      Restrictions  Position Activity Restriction  Other position/activity restrictions: up as tolerated, COVID positive-airborne precautions, [place] J at all times (except when eating, bathing or showering)    Subjective   General  Chart Reviewed: Yes    Additional Pertinent Hx: 80 y.o. F adm 8/3 after a fall. Imaging revealed traumatic comminuted and displaced left occipital condyle and C1 left lateral mass fractures with extension to the foramen transversarium. Neurosurgery consulted, rec conservative management. Family / Caregiver Present: No    Diagnosis: Acute metabolic encephalopathy    Subjective  Subjective: Pt found supine, agreeable to OT. Pt is profoundly hard of hearing, has hearing aide in place in left. Very fearful of falling. Patient Currently in Pain: Yes(with movement, not rated, RN aware)        Social/Functional History  Social/Functional History  Type of Home: Facility([facility])  Home Layout: One level  Home Equipment: Wheelchair-manual, Rolling walker(Per CM notes, family reports pt mainly in w/c but does some walking with RW and assist; assist with ADL)  Receives Help From: Personal care attendant  ADL Assistance: (assist for dressing/bathing)  Homemaking Assistance: (pt does not perform)  Ambulation Assistance: (assist with rolling walker-mainly in w/c)  Transfer Assistance: Needs assistance  Active : No  Occupation: Retired  Additional Comments: Pt unable to provide PLF. PLF info obtained from CM notes who spoke with family. Objective    Treatment included functional transfer training, ADLs, and patient education. Vision: Impaired  Vision Exceptions: Wears glasses at all times  Hearing: Exceptions to [facility]  Hearing Exceptions: Hard of hearing/hearing concerns; Left hearing aid      Orientation  Overall Orientation Status: Impaired  Orientation Level: Oriented to person    Balance  Sitting Balance: Minimal assistance(progressing to SBA, sitting at EOB.  Initially has very strong  on chair/therapist's hand due to fear of falling; able to eventually let up. Pt sat a total of approx 13 mins)    Standing Balance  Comment: Attempted to perform sit-stand from EOB in prep for possible transfer to chair. Pt able to perform partial sit-stand with Max A x2; however, unable to fully achieve upright posture. Pt began resisting and was unable to safely stand or step enough to complete transfer; therefore, she was returned to seated position. ADL  Feeding: Setup(to use mouth swab)  Grooming: Setup  UE Dressing: Dependent/Total(to don/adjust [place] J)  LE Dressing: Maximum assistance  Toileting: Dependent/Total    Bed mobility  Rolling to Left: 2 Person assistance(Max A x2, max cues)  Supine to Sit: 2 Person assistance(dependent x2; max cues/pt very resistive to moving)  Sit to Supine: 2 Person assistance(dependent x2; max cues/pt very resistive to moving)  Scooting: Maximal assistance(out to EOB)    Cognition  Cognition Comment: Difficult to formally assess due to pt's profound difficulty hearing. She has a left hearing aide in, but appeared to not fully help. LUE AROM (degrees)  LUE General AROM: Not formally assessed due to pain at neck/shoulder area  RUE AROM (degrees)  RUE General AROM: Not formally assessed due to pain at neck/shoulder area       Plan   Plan  Times per week: 2-5x  Times per day: Daily    If patient discharges prior to next treatment, this note will serve as discharge summary. Will continue per POC if patient does not discharge.     AM-PAC Score        [facility] Inpatient Daily Activity Raw Score: 10 (08/04/20 1425)  AM-PAC Inpatient ADL T-Scale Score : 27.31 (08/04/20 1425)  ADL Inpatient CMS 0-100% Score: 74.7 (08/04/20 1425)  ADL Inpatient CMS G-Code Modifier : CL (08/04/20 1425)    Goals  Short term goals  Time Frame for Short term goals: By d/c  Short term goal 1: Supine <> sit with Min A x2 - not met  Short term goal 2: Sitting at EOB to complete ADL task with setup - not met  Short term goal 3: Sit-stand transfer with Min A x2 - not met  Patient Goals   Patient goals : Not stated       Therapy Time   Individual Concurrent Group Co-treatment   Time In 1315         Time Out 1355         Minutes 40           Timed Code Treatment Minutes:   25    Total Treatment Minutes:  [address]

## 2020-08-04 NOTE — PROGRESS NOTES
Patient is alert to self only. RA, sat 95%. No complaints of SOB. Medicated as needed for c/o pain per the advanced dementia pain scale. Respirations appear to be easy and unlabored. Lungs clear. Respirations easy with no complaints of cough. Cardiac monitor in place, current rhythm NSR to sinus tachycardia at times. Patient remains asymptomatic with elevated heart rate. No complaints of nausea/vomiting/diarrhea. Up with lift  to the bathroom/BSC as needed. Cha intact with blood output and occasional clots. Tolerating general diet. IVF infusing as ordered. Plan of care and safety measures reviewed with the patient. Call light in reach and bed alarm in place. Will continue to monitor.   Electronically signed by Carmel Dumont RN on 8/3/2020 at 9:56 PM

## 2020-08-04 NOTE — PROGRESS NOTES
NEUROSURGERY PROGRESS NOTE    8/4/2020 11:31 AM                               Joi Quigley                      LOS: 1 day       Vitals:    08/04/20 0901   BP: 113/73   Pulse: 101   Resp: 17   Temp: 95.5 °F (35.3 °C)   SpO2: 96%     Due to limiting exposure to COVID-19, initial encounter was completed by using EMR and from conversations with medical team involved in case. Patient was not interviewed or examined, at this time. I have personally reviewed the reports and images of labs, radiological studies, current and old medical records     Per bedside RN: Patient is complaining of left hip and knee pain along with back pain. The patient is very hard of hearing even with hearing aids. The faculty at the patient's [facility] states that the patient is normally alert and oriented but they feel that she was confused prior to arrival at ED.     Radiological Findings:  Ct Head Wo Contrast  Result Date: 8/3/2020  Small right frontal scalp contusion. Comminuted displaced left occipital condyle and C1 lateral mass fracture. Please see same day cervical spine CT for further details. No acute intracranial abnormality. Prominence of the CSF space along the right convexity may relate to patient positioning as opposed to a subdural hygroma. Age-related cerebral volume loss and chronic white matter microvascular ischemic disease.      Ct Cervical Spine Wo Contrast  Result Date: 8/3/2020  Comminuted and displaced left occipital condyle and C1 left lateral mass fractures with extension to the foramen transversarium. Recommend follow-up CT angiogram of the neck. Asymmetric widening of the anterior right atlanto-occipital joint. Cervical spondylosis.      Labs:  Recent Labs     08/04/20  0830   WBC 4.7   HGB 11.0*   HCT 32.6*   *       Recent Labs     08/04/20  0830      K 4.9      CO2 28   BUN 57*   CREATININE 3.3*   GLUCOSE 146*   CALCIUM 8.5   PHOS 3.1       Recent Labs     08/03/20  1005   PROTIME 12.0   INR 1.03 Patient Active Problem List    Diagnosis Date Noted    Acute metabolic encephalopathy 77/62/9085    Closed bimalleolar fracture of right ankle     Bimalleolar ankle fracture, left, closed, initial encounter     Impacted cerumen of both ears 02/08/2016    Skin lesion 02/08/2016    Heart murmur 10/16/2015    Pain in both feet 09/28/2015    Shoulder pain, bilateral 06/16/2014    Edema 05/30/2012    Pernicious anemia 10/04/2011    Menopausal symptoms 10/04/2011    Onychomycosis 10/04/2011    Elevated blood pressure reading 10/04/2011    Leukopenia 10/04/2011    Esophageal reflux     Hypothyroidism     Carcinoma in situ of colon     Cataract     Allergic rhinitis      Assessment:  Patient is a 80 y.o. female s/p fall w/traumatic comminuted and displaced left occipital condyle and C1 left lateral mass fractures with extension to the foramen transversarium.     Plan:  1. No neurosurgical intervention indicated given patient's age and location of fracture. Would recommend placement. Follow up with Neurosurgery (Dr. Haskell Primrose) in 6 weeks. 2. Neurologic exams frequency: Q4H  3. For change in exam MUST contact neurosurgery team along with critical care or primary team  4. Occipital condyle & C1 Fracture:  - MRI/MRA negative for dissection and stroke  - Sheridan J collar to be worn at all times  - Cervical collar does NOT need to be worn when eating, bathing or showering  - Cervical collar pads to be cleaned with soap & water, air dried, and changed daily  5. Pain control: Managed by medical team  6. PT/OT consulted, appreciate recs  7. Advance diet / activity per primary team  8. Will sign off.  Please call with any questions or decline in neurological status     DISPO: Dispo timing to be determined by primary team once patient is medically stable for discharge.     Patient was discussed with Dr. Flaca Guevara who agrees with above assessment and plan.      Electronically signed by: CELIA Parker-ISAIAS, 8/4/2020 11:31 AM  [phone]

## 2020-08-05 ENCOUNTER — APPOINTMENT (OUTPATIENT)
Dept: GENERAL RADIOLOGY | Age: 85
DRG: 668 | End: 2020-08-05
Attending: INTERNAL MEDICINE
Payer: COMMERCIAL

## 2020-08-05 LAB
A/G RATIO: 1.1 (ref 1.1–2.2)
ALBUMIN SERPL-MCNC: 3.2 G/DL (ref 3.4–5)
ALP BLD-CCNC: 62 U/L (ref 40–129)
ALT SERPL-CCNC: 10 U/L (ref 10–40)
ANION GAP SERPL CALCULATED.3IONS-SCNC: 14 MMOL/L (ref 3–16)
APTT: 22.3 SEC (ref 24.2–36.2)
AST SERPL-CCNC: 28 U/L (ref 15–37)
BASOPHILS ABSOLUTE: 0.1 K/UL (ref 0–0.2)
BASOPHILS RELATIVE PERCENT: 1 %
BILIRUB SERPL-MCNC: 0.3 MG/DL (ref 0–1)
BUN BLDV-MCNC: 48 MG/DL (ref 7–20)
CALCIUM SERPL-MCNC: 8.5 MG/DL (ref 8.3–10.6)
CHLORIDE BLD-SCNC: 104 MMOL/L (ref 99–110)
CO2: 21 MMOL/L (ref 21–32)
CREAT SERPL-MCNC: 2.8 MG/DL (ref 0.6–1.2)
D DIMER: 423 NG/ML DDU (ref 0–229)
EOSINOPHILS ABSOLUTE: 0 K/UL (ref 0–0.6)
EOSINOPHILS RELATIVE PERCENT: 0.7 %
FIBRINOGEN: 283 MG/DL (ref 200–397)
GFR AFRICAN AMERICAN: 19
GFR NON-AFRICAN AMERICAN: 16
GLOBULIN: 2.8 G/DL
GLUCOSE BLD-MCNC: 105 MG/DL (ref 70–99)
GLUCOSE BLD-MCNC: 110 MG/DL (ref 70–99)
GLUCOSE BLD-MCNC: 121 MG/DL (ref 70–99)
GLUCOSE BLD-MCNC: 126 MG/DL (ref 70–99)
GLUCOSE BLD-MCNC: 94 MG/DL (ref 70–99)
HCT VFR BLD CALC: 30.8 % (ref 36–48)
HEMOGLOBIN: 10.4 G/DL (ref 12–16)
INR BLD: 1.01 (ref 0.86–1.14)
LYMPHOCYTES ABSOLUTE: 0.5 K/UL (ref 1–5.1)
LYMPHOCYTES RELATIVE PERCENT: 9.5 %
MCH RBC QN AUTO: 29.1 PG (ref 26–34)
MCHC RBC AUTO-ENTMCNC: 33.6 G/DL (ref 31–36)
MCV RBC AUTO: 86.6 FL (ref 80–100)
MONOCYTES ABSOLUTE: 0.6 K/UL (ref 0–1.3)
MONOCYTES RELATIVE PERCENT: 11.6 %
NEUTROPHILS ABSOLUTE: 3.9 K/UL (ref 1.7–7.7)
NEUTROPHILS RELATIVE PERCENT: 77.2 %
ORGANISM: ABNORMAL
PDW BLD-RTO: 17.3 % (ref 12.4–15.4)
PERFORMED ON: ABNORMAL
PERFORMED ON: NORMAL
PHOSPHORUS: 3.2 MG/DL (ref 2.5–4.9)
PLATELET # BLD: 130 K/UL (ref 135–450)
PMV BLD AUTO: 8.2 FL (ref 5–10.5)
POTASSIUM REFLEX MAGNESIUM: 4.9 MMOL/L (ref 3.5–5.1)
POTASSIUM SERPL-SCNC: 4.9 MMOL/L (ref 3.5–5.1)
PROTHROMBIN TIME: 11.7 SEC (ref 10–13.2)
RBC # BLD: 3.56 M/UL (ref 4–5.2)
SODIUM BLD-SCNC: 139 MMOL/L (ref 136–145)
TOTAL PROTEIN: 6 G/DL (ref 6.4–8.2)
TROPONIN: 0.15 NG/ML
TROPONIN: 0.15 NG/ML
URINE CULTURE, ROUTINE: ABNORMAL
URINE CULTURE, ROUTINE: ABNORMAL
WBC # BLD: 5.1 K/UL (ref 4–11)

## 2020-08-05 PROCEDURE — 2580000003 HC RX 258: Performed by: INTERNAL MEDICINE

## 2020-08-05 PROCEDURE — 85025 COMPLETE CBC W/AUTO DIFF WBC: CPT

## 2020-08-05 PROCEDURE — 80053 COMPREHEN METABOLIC PANEL: CPT

## 2020-08-05 PROCEDURE — 85379 FIBRIN DEGRADATION QUANT: CPT

## 2020-08-05 PROCEDURE — 85610 PROTHROMBIN TIME: CPT

## 2020-08-05 PROCEDURE — 84484 ASSAY OF TROPONIN QUANT: CPT

## 2020-08-05 PROCEDURE — 85384 FIBRINOGEN ACTIVITY: CPT

## 2020-08-05 PROCEDURE — 2500000003 HC RX 250 WO HCPCS: Performed by: UROLOGY

## 2020-08-05 PROCEDURE — 2060000000 HC ICU INTERMEDIATE R&B

## 2020-08-05 PROCEDURE — 2580000003 HC RX 258: Performed by: UROLOGY

## 2020-08-05 PROCEDURE — 92526 ORAL FUNCTION THERAPY: CPT

## 2020-08-05 PROCEDURE — 51700 IRRIGATION OF BLADDER: CPT

## 2020-08-05 PROCEDURE — 85730 THROMBOPLASTIN TIME PARTIAL: CPT

## 2020-08-05 PROCEDURE — 74018 RADEX ABDOMEN 1 VIEW: CPT

## 2020-08-05 PROCEDURE — 51702 INSERT TEMP BLADDER CATH: CPT

## 2020-08-05 PROCEDURE — 6360000002 HC RX W HCPCS: Performed by: INTERNAL MEDICINE

## 2020-08-05 RX ORDER — METOCLOPRAMIDE HYDROCHLORIDE 5 MG/ML
10 INJECTION INTRAMUSCULAR; INTRAVENOUS ONCE
Status: COMPLETED | OUTPATIENT
Start: 2020-08-05 | End: 2020-08-05

## 2020-08-05 RX ADMIN — METOCLOPRAMIDE 10 MG: 5 INJECTION, SOLUTION INTRAMUSCULAR; INTRAVENOUS at 17:03

## 2020-08-05 RX ADMIN — ONDANSETRON 4 MG: 2 INJECTION INTRAMUSCULAR; INTRAVENOUS at 00:57

## 2020-08-05 RX ADMIN — CEFTRIAXONE 1 G: 1 INJECTION, POWDER, FOR SOLUTION INTRAMUSCULAR; INTRAVENOUS at 10:04

## 2020-08-05 RX ADMIN — Medication 10 ML: at 10:23

## 2020-08-05 RX ADMIN — SODIUM CHLORIDE, POTASSIUM CHLORIDE, SODIUM LACTATE AND CALCIUM CHLORIDE: 600; 310; 30; 20 INJECTION, SOLUTION INTRAVENOUS at 05:44

## 2020-08-05 RX ADMIN — HEPARIN SODIUM 5000 UNITS: 5000 INJECTION INTRAVENOUS; SUBCUTANEOUS at 22:10

## 2020-08-05 RX ADMIN — HEPARIN SODIUM 5000 UNITS: 5000 INJECTION INTRAVENOUS; SUBCUTANEOUS at 16:57

## 2020-08-05 RX ADMIN — MORPHINE SULFATE 2 MG: 2 INJECTION, SOLUTION INTRAMUSCULAR; INTRAVENOUS at 00:56

## 2020-08-05 RX ADMIN — MORPHINE SULFATE 2 MG: 2 INJECTION, SOLUTION INTRAMUSCULAR; INTRAVENOUS at 11:29

## 2020-08-05 RX ADMIN — AMINOCAPROIC ACID: 250 INJECTION, SOLUTION INTRAVENOUS at 13:36

## 2020-08-05 ASSESSMENT — PAIN SCALES - PAIN ASSESSMENT IN ADVANCED DEMENTIA (PAINAD)
BODYLANGUAGE: 2
BREATHING: 1
FACIALEXPRESSION: 2
CONSOLABILITY: 1
NEGVOCALIZATION: 2
TOTALSCORE: 8

## 2020-08-05 ASSESSMENT — PAIN SCALES - GENERAL
PAINLEVEL_OUTOF10: 0
PAINLEVEL_OUTOF10: 5
PAINLEVEL_OUTOF10: 0

## 2020-08-05 ASSESSMENT — PAIN DESCRIPTION - PAIN TYPE: TYPE: ACUTE PAIN

## 2020-08-05 NOTE — PROGRESS NOTES
goals:  Goal 1: Patient will tolerate repeat BSE.  8/5: Pt in bed upon entry, reaching for SLP's arms and stating \"I can't wait, I can't wait. \" Pt with severe hearing and visual deficits, stating \"I can't hear and I can't see. \" Pt with confusion, c/o desire for PO and needing something to drink but then stating \"I can't take anything - I'm going to be sick. \" Pt then would state \"no more, no more\" but take drink spontaneously or open mouth for bolus. Analyzed pt with ice chips, thins via tsp / cup / straw, nectar thick liquids via tsp / cup / straw, and single small bite of regular solid. Attempted soft solid but pt declined. Pt declining solids beyond puree, stating inability to consume d/t xerostomia; however, pt then indicated inability to take liquids d/t nausea. Pt with intermittent delayed cough and wet vocal quality with all consistencies; no difference in performance noted between thin vs thickened liquids. Pt with prolonged mastication and oral manipulation of very small regular bolus with mod oral residue. Pt with talking at times immediately after taking bolus into oral cavity. Suspect pharyngeal dysphagia given AMS and cervical fx but unable to make complete assessment d/t pt's inability to participate in instrumental evaluation 2/2 COVID (+) status. Education completed with family as detailed below and options for NPO vs PO reviewed. Would recommend either NPO + [employer] Protocol + temporary alternate means of nutrition OR full liquid (NO STRAWS) with feed assist and close monitoring of respiratory status. Family to decide and notify staff of decision later this date. GOAL MET. Goal 2: Patient/caregiver will demonstrate understanding of compensatory strategies/recommendations for improved swallowing safety/function. 8/5: Attempted to contact POA, son Jean Paul Leavitt, via phone after session but went directly to voicemail.  RN notified SLP that son requested his sister Luba Rico be contacted if he cannot be reached. Max Flood via phone and she deferred to sister-in-law Alecia Garrison (Cash's spouse and former RN). SLP then contacted Alecia Garrison via phone. D/W both Liu Forest and Alecia Garrison role of SLP, BSE results, results of session this date, pt's AMS, current medical complications impacting swallow, concerns for dysphagia, concerns for aspiration, s/s and risks associated with aspiration, inability to complete instrumental swallow evaluation, NPO vs PO options, risks and potential benefits associated with NPO vs PO options, compensatory strategies to reduce risk of aspiration, importance of oral care, team's desire to attempt NG + TF if pt remains NPO, concerns for pt's ability to manage TF, inability to eliminate aspiration risk even with NPO status, and potential for swallow function to improve as infection treated / mental status clears. Pt previously on regular diet with no dysphagia concerns per both family members. Liu Garg indicated leaning towards NPO choice but unable to make that decision as not POA and deferred to Tamera Escobar / Alecia Garrison to decide. Alecia Garrison indicated desire to d/w siblings and Tamera Escobar (who she reported is not reachable via phone by SLP d/t his work schedule and conference calls). Alecia Garrison reported she would d/w family and notify staff of decision later this date. Attempted to educated pt to role of SLP, purpose of visit, importance of PO intake, use of liquids to improve c/o xerostomia, and concerns for swallow. Pt unable to demonstrate comprehension d/t [place] and AMS. Goal met with family.        Patient/Family/Caregiver Education:  As above    Compensatory Strategies:  If family elects PO, recommend:  Upright for all PO intake + 30-45 minutes after  No straws  Assist feed  Small bites / sips  Alternate solids and liquids   Aggressive oral care 3x/day      Plan:  Continued daily Dysphagia treatment with goals per  plan of care.   Diet recommendations:  Given pt's advanced age, AMS, overt s/s of pharyngeal dysphagia at bedside, and inability to complete full assessment of swallow via instrumental evaluation 2/2 COVID (+) status, family to determine between following options with knowledge of risks / benefits associated with each option:  -NPO + [employer] Protocol (ice chips / THIN water only after oral care) + temporary alternate means of nutrition + medications via alternate means    OR    -Full liquid / thin liquid diet (NO straws) + assist feed + meds crushed in puree    DC recommendation: ongoing dysphagia tx warranted at this time  Treatment: 20 min  D/W nursing, Sully Kaminski. [address] [facility] [address] Diaz Sweeney  Needs met prior to leaving room, call button in reach. Edward Huertas MA CCC-SLP; [id number]  Speech-Language Pathologist  Phone # [phone]  Pg.  # [phone]     If patient is discharged prior to next treatment, this note will serve as the discharge summary

## 2020-08-05 NOTE — PROGRESS NOTES
Urology Attending Progress Note      Subjective: confused. NAD    Vitals:  BP (!) 138/98   Pulse 97   Temp 97.2 °F (36.2 °C) (Oral)   Resp 18   Ht 5' 2\" (1.575 m)   Wt 157 lb 6.5 oz (71.4 kg)   SpO2 92%   BMI 28.79 kg/m²   Temp  Av.9 °F (36.1 °C)  Min: 96.2 °F (35.7 °C)  Max: 97.4 °F (36.3 °C)    Intake/Output Summary (Last 24 hours) at 2020 1131  Last data filed at 2020 0415  Gross per 24 hour   Intake 10 ml   Output 625 ml   Net -615 ml       Exam: abd soft. Newman draining red urine     Labs:  WBC:    Lab Results   Component Value Date    WBC 5.1 2020     Hemoglobin/Hematocrit:    Lab Results   Component Value Date    HGB 10.4 2020    HCT 30.8 2020     BMP:    Lab Results   Component Value Date     2020    K 4.9 2020    K 4.9 2020     2020    CO2 21 2020    BUN 48 2020    LABALBU 3.2 2020    CREATININE 2.8 2020    CALCIUM 8.5 2020    GFRAA 19 2020    GFRAA >60 2012    LABGLOM 16 2020     PT/INR:    Lab Results   Component Value Date    PROTIME 11.7 2020    INR 1.01 2020     PTT:    Lab Results   Component Value Date    APTT 22.3 2020   [APTT      Urine Culture:  Klebsiella <50K        Antibiotic Therapy:  Rocephin     Imaging: CT 8/3/20:     1. Mild bilateral hydronephrosis with ureters dilated to the level of the urinary bladder of uncertain etiology. 2. Collapsed urinary bladder with Newman catheter in place. There is suggestion of diffuse bladder wall thickening. Further evaluation with cystoscopy may be indicated. 3. Moderate age-indeterminate compression fracture of the L1 vertebral body. Impression/Plan: 81 yo F with gross hematuria, JUANCARLOS and mild bilateral hydro     -newman irrigated for moderate amounts of small clots. Unable to clear urine  -24 fr 3-way placed and CBI started on low rate  Cr @ 2.8 from 3.3.  Will follow renal function in AM  -continue abx  -will give a dose of Amicar in CBI today and see if this clears urine       CELIA Gilbert - CNP

## 2020-08-05 NOTE — PROGRESS NOTES
81yo F admit after fall / found down at [facility]. COVID +.  following for gross hematuria and bilateral hydro with JUANCARLOS  Hematuria improved with manual irrigation  CT abd/pelvis non contrast personally reviewed and interpreted. Bilateral hydroureter to bladder without stone or obstructing lesion. Bladder decompressed around catheter    PLAN:  -Hematuria improved with manual irrigation. Cont. May be 2/2 UTI vs decompression of distended bladder with cath placement  -Bilateral hydro with JUANCARLOS: Creatinine improved to 3.3mg/dL just with catheter. Suspect hydro due to prior retention? Not well documented, but per daughter Anastasiia Adams, patient with many months of difficulty voiding.   Cont to trend creatinine with cath in place    Plan of care discussed with daughter, Anastasiia Adams

## 2020-08-05 NOTE — CARE COORDINATION
CM following for discharge planning. CM spoke with pts [facility]. Pt is COVID positive and will need SNF at discharge. Son in agreeement. AVA gave Glen Tang the list of facilities that are willing to take COVID positive, he will look them over and get back to CM on top three choices. AVA explained to Geln Tang that we may be restricted to whichever facility has a bed, he verbalized an understanding. Referrals sent to 6 facilities that take COVID positive patients. Pt will need precert once a facility is found. Glen Tang gave his email for communication other than phone if needed. [email or web address] to continue to follow.      Anila Lundy RN, BSN, [address]  Case Management Department  [phone]

## 2020-08-05 NOTE — PROGRESS NOTES
NUTRITION ASSESSMENT  Admission Date: 8/3/2020     Type and Reason for Visit: Initial, Consult    NUTRITION RECOMMENDATIONS:   1. PO Diet: NPO; recommendations for safest diet per SLP    Tube Feeding Goal:  Jevity 1.2 @ 55 mLx 23 hours (adjusting for synthroid drug-nutrient interaction- hold TF 30 minutes before and after once daily dose) + 1 bottle [id number] daily provides 1265 mL TV, 1622 kcal, 96 grams protein, 1021 mL free water. Maintenance water flush of 30 ml every 4 hours. NUTRITION ASSESSMENT:  RD consult for TF ordering and management. Patient admitted for fall, AMS. Unable to visit with patient directly 2/2 COVID rule out; patient not appropriate for phone interview due to confusion. Per MD, corpak to be placed and TF to start. Per SLP therapy, overt dysphagia, unable to complete full assessment, with recs for NPO with Levie Goldman water protocol and alternative nutrition versus full liquid diet. Limited weight history per EMR. Will monitor TF adequacy and tolerance. RD did not conduct direct, in-person nutrition evaluation in efforts to reduce exposure and use of PPE for high risk persons, PUI persons, patients who have tested positive for Covid-19 or those awaiting respiratory panel results. EMR was screened for nutrition risk factors, as defined per nutrition standards of care. Nutrition history and assessment obtained through RD EMR review. MALNUTRITION ASSESSMENT  Due to current CDC guidelines recommending 6-ft distancing for social isolation for COVID19 prevention, physical aspects of the malnutrition assessment were withheld at this time. Context of Malnutrition: Acute Illness   Malnutrition Status:  At risk for malnutrition (Comment)  Findings of the 6 clinical characteristics of malnutrition (Minimum of 2 out of 6 clinical characteristics is required to make the diagnosis of moderate or severe Protein Calorie Malnutrition based on AND/ASPEN Guidelines):  Energy Intake: Unable to Assess    Energy Intake Time: Unable to assess   Weight Loss %: Unable to assess    Weight loss Time: Unable to assess   Body Fat Loss: Unable to Assess   Body Fat Location: Unable to assess    Body Muscle Loss: Unable to Assess   Body Muscle Loss Location: Unable to assess    Fluid Accumulation: No significant    Fluid Accumulation Location: No significant     Strength: Not Performed; Not Measured     NUTRITION DIAGNOSIS   Problem: Problem #1: Inadequate oral intake  Etiology: Insufficient energy/nutrient consumption  Signs & Symptoms: NPO status due to medical condition and Nutrition [address] and/or Nutrient Delivery:Continue NPO or Start Tube Feeding   Nutrition education/counseling/coordination of care: Continue Inpatient Monitoring  or Speech Therapy    NUTRITION MONITORING & EVALUATION:  Evaluation:Goals set   Goals:Goals: Patient to tolerate EN to meet 100% of nutrition needs  Monitoring: Chewing/Swallowing , Pertinent Labs , TF Intake , TF Tolerance  or Weight      OBJECTIVE DATA:  · Nutrition-Focused Physical Findings: No BM or edema documented. +confusion.    · Wounds: Multiple, skin tear, blister    Past Medical History:   Diagnosis Date    Carcinoma in situ of colon 1998    Diarrhea     Esophageal reflux     Other extrapyramidal disease and abnormal movement disorder     Pernicious anemia     S/P colonoscopy 4/09    OK - no further eval.    Unspecified cataract     Unspecified hypothyroidism     Unspecified pruritic disorder         ANTHROPOMETRICS  Current Height: 5' 2\" (157.5 cm)  Current Weight: 157 lb 6.5 oz (71.4 kg)    Admission weight: 155 lb (70.3 kg)  Ideal Bodyweight: 50kg  Usual Bodyweight: UTO  Adjusted Bodyweight: n/a  Weight Changes: MACK      BMI BMI (Calculated): 28.9    Wt Readings from Last 50 Encounters:   08/05/20 157 lb 6.5 oz (71.4 kg)   08/03/20 155 lb (70.3 kg)   01/28/19 169 lb 5 oz (76.8 kg)   12/20/18 169 lb 5 oz (76.8 kg) 11/19/18 169 lb 5 oz (76.8 kg)   10/29/18 169 lb 5 oz (76.8 kg)   10/21/18 169 lb 5 oz (76.8 kg)   04/22/16 135 lb (61.2 kg)   02/25/16 139 lb (63 kg)   02/08/16 137 lb (62.1 kg)   10/16/15 145 lb (65.8 kg)   09/28/15 142 lb (64.4 kg)   11/19/14 146 lb (66.2 kg)   10/08/14 146 lb (66.2 kg)   08/12/14 145 lb (65.8 kg)   06/16/14 146 lb (66.2 kg)   12/27/13 146 lb (66.2 kg)   05/14/13 149 lb (67.6 kg)   05/30/12 148 lb (67.1 kg)   10/04/11 148 lb (67.1 kg)   09/08/10 147 lb (66.7 kg)       COMPARATIVE STANDARDS  Estimated Total Kcals/Day : 22-25  Current Bodyweight (71.4 kg) 1571 - 1785 kcal  - overweight  Estimated Total Protein (g/day) : 1.3-1.5 Current Bodyweight (71.4 kg) 93 - 107g/day  Estimated Daily Total Fluid (ml/day): 1550 - 1800 mL per day     Food / Nutrition-Related History  Pre-Admission / Home Diet:  Pre-Admission/Home Diet: Unable to [address] / [place]:   none noted  Food Restrictions / Cultural Requests:   none noted    Diet Orders / Intake / Nutrition Support  Current diet/supplement order: Diet NPO Effective Now  Diet Tube Feed Continuous/Cyclic w/ Diet     NSG Recorded PO:   PO Fluids P.O.: 0 mL  PO Meals     PO Intake: NPO  PO Supplement: NPO   PO Supplement Intake: NPO  IVF: LR @ 50 ml/hr     NUTRITION RISK LEVEL: Risk Level: [address]  [place]:  [phone]  Office:  [phone]

## 2020-08-05 NOTE — RESULT ENCOUNTER NOTE
The patient is at [facility] [employer] spoke to Shantal Smith for notification of a POSITIVE test result for COVID-19. The COVID-19 test result was positive. Treatment of coronavirus does not require an antibiotic  Remain isolated for 10 days since symptoms first appeared AND At least 3 days have passed after recovery (Recovery is defined as resolution of fever without the use of fever-reducing medications with progressive improvement or resolution of other symptoms). Wash hands often with soap and water for at least 20 seconds or alternatively use hand  with at least 60% alcohol content  Cover coughs and sneezes  Wear a mask when around others if possible  Clean all \"high-touch\" surfaces every day, such as doorknobs and cellphones  Continually monitor symptoms. Contact a medical provider if symptoms are worsening, such as difficulty breathing. For additional information, please visit the Centers for Disease Control and Prevention ([email or web address].

## 2020-08-05 NOTE — PROGRESS NOTES
Nephrology Note                                                                                                                                                                                                                                                                                                                                                               Office : [phone]     Fax :[phone]      Patient's Name: Kathy Martínez  8:39 AM  8/5/2020    Reason for Consult:  Elevated Cr  Requesting Physician:  Eliseo Mcfarlane MD      Chief Complaint:  Fall    History of Present Ilness:    Kathy Martínez is a 80 y.o. female with PMH significant for carcinoma in situ of colon, pernicious anemia, and hypothyroidism. She presented to [facility] ED because she fell and was found on the floor. She is complaining of left hip and knee pain, as well as pain at the back of her head but she denies loss of consciousness. Denies chest pain, abdominal pain, and numbness or tingling. Denies being on blood thinner. According to her son, she ambulates a little bit with a walker, but mainly uses a wheelchair. He reports that she has been a little more confused in the last week. 8/5: Pt not seen today due to recent COVID diagnosis to limit exposure. Per nurse, pt was unable to tolerate fluids by mouth yesterday and this continues today. Speech rec spoon fulls of water and ice chips for now. Pt had overt s/s of pharyngeal dysphagia at bedside and was unable to complete full assessment of swallow via instrumental evaluation secondary to + covid status. Want to per her on a care pack and talking with family but suspect they wont want to do it, she will most likely be put on a diet for comfort. She is not urinating well and still has large clots, per nurse urology says pt needs cystoscopy. Pt is more alert, but still confused. 8/4: Pt not seen today due to recent COVID diagnosis to limit exposure.  Per nurse, pt still making plenty of urine and the color has improved, now a light pink and with decreased clots. Pt is opening eyes more often now, had her L hearing aid put in by nurse and was able to deny and feelings of pain and told the nurse her name. Past Medical History:   Diagnosis Date    Carcinoma in situ of colon 1998    Diarrhea     Esophageal reflux     Other extrapyramidal disease and abnormal movement disorder     Pernicious anemia     S/P colonoscopy 4/09    OK - no further eval.    Unspecified cataract     Unspecified hypothyroidism     Unspecified pruritic disorder        Past Surgical History:   Procedure Laterality Date    COLECTOMY      partial    COLONOSCOPY  4/09    OK - no further eval    HYSTERECTOMY         No family history on file. reports that she has never smoked. She has never used smokeless tobacco. She reports that she does not drink alcohol or use drugs.     Allergies:  Bee venom    Current Medications:    acetaminophen (TYLENOL) tablet 650 mg, Q6H PRN    Or  acetaminophen (TYLENOL) suppository 650 mg, Q6H PRN  heparin (porcine) injection 5,000 Units, 3 times per day  lactated ringers infusion, Continuous  levothyroxine (SYNTHROID) tablet 125 mcg, Daily  sodium chloride flush 0.9 % injection 10 mL, 2 times per day  sodium chloride flush 0.9 % injection 10 mL, PRN  acetaminophen (TYLENOL) tablet 650 mg, Q6H PRN    Or  acetaminophen (TYLENOL) suppository 650 mg, Q6H PRN  polyethylene glycol (GLYCOLAX) packet 17 g, Daily PRN  promethazine (PHENERGAN) tablet 12.5 mg, Q6H PRN    Or  ondansetron (ZOFRAN) injection 4 mg, Q6H PRN  [Held by provider] magnesium sulfate 2 g in 50 mL IVPB premix, PRN  cefTRIAXone (ROCEPHIN) 1 g IVPB in 50 mL D5W minibag, Q24H  insulin lispro (1 Unit Dial) 0-6 Units, TID WC  insulin lispro (1 Unit Dial) 0-3 Units, Nightly  glucose (GLUTOSE) 40 % oral gel 15 g, PRN  dextrose 50 % IV solution, PRN  glucagon (rDNA) injection 1 mg, PRN  dextrose 5 % solution, PRN  perflutren lipid microspheres (DEFINITY) injection 1.65 mg, ONCE PRN  morphine (PF) injection 2 mg, Q4H PRN        Review of Systems:   14 point ROS obtained but were negative except mentioned in HPI      Physical exam:     Vitals:  BP (!) 138/98   Pulse 97   Temp 97.2 °F (36.2 °C) (Oral)   Resp 18   Ht 5' 2\" (1.575 m)   Wt 157 lb 6.5 oz (71.4 kg)   SpO2 92%   BMI 28.79 kg/m²   Due to limiting exposure to COVID-19, patient was not interviewed or examined, at this time. I have personally reviewed the reports and images of labs, radiological studies, current and old medical records. Data:   Labs:  CBC:   Recent Labs     08/03/20  1005 08/04/20  0830 08/05/20  0456   WBC 4.2 4.7 5.1   HGB 10.6* 11.0* 10.4*    132* 130*     BMP:    Recent Labs     08/04/20  0508 08/04/20  0830 08/05/20  0456    137 139   K 5.4* 4.9 4.9  4.9    101 104   CO2 28 28 21   BUN 55* 57* 48*   CREATININE 3.3* 3.3* 2.8*   GLUCOSE 137* 146* 126*     Ca/Mg/Phos:   Recent Labs     08/04/20  0508 08/04/20  0830 08/05/20  0456   CALCIUM 8.4 8.5 8.5   PHOS  --  3.1 3.2     Hepatic:   Recent Labs     08/03/20  1005 08/04/20  0508 08/05/20  0456   AST 23 27 28   ALT 8* 8* 10   BILITOT 0.4 0.3 0.3   ALKPHOS 61 61 62     Troponin:   Recent Labs     08/04/20  1817 08/05/20  0251 08/05/20  0447   TROPONINI 0.19* 0.15* 0.15*     BNP: No results for input(s): BNP in the last 72 hours. Lipids: No results for input(s): CHOL, TRIG, HDL, LDLCALC, LABVLDL in the last 72 hours. ABGs: No results for input(s): PHART, PO2ART, AGI3LTK in the last 72 hours.   INR:   Recent Labs     08/03/20  1005 08/04/20  0508 08/05/20  0456   INR 1.03 1.03 1.01     UA:  Recent Labs     08/03/20  0338   COLORU YELLOW   CLARITYU CLOUDY*   GLUCOSEU Negative   BILIRUBINUR Negative   KETUA TRACE*   SPECGRAV 1.013   BLOODU LARGE*   PHUR 5.5   PROTEINU 100*   UROBILINOGEN 0.2   NITRU Negative   LEUKOCYTESUR LARGE*   LABMICR YES   Lucho Flemings NotGiven      Urine Microscopic:   Recent Labs     08/03/20  0338   COMU see below   HYALCAST 0   WBCUA 335*   RBCUA 77*   EPIU 0     Urine Culture:   Recent Labs     08/03/20  0620   LABURIN <10,000 CFU/ml mixed skin/urogenital cammie. No further workup*  50,000 CFU/ml     Urine Chemistry:   Recent Labs     08/03/20  1410   LABCREA 74.4   PROTEINUR 435.00*   NAUR 80         IMAGING:  MRA NECK WO CONTRAST   Final Result   1. No evidence of acute infarct or other acute intracranial abnormality. 2. Moderate global volume loss with moderate chronic small vessel ischemic disease within the periventricular white matter. MRA NECK:      FINDINGS: The vertebral arteries are patent bilaterally and equal in size. No evidence of vertebral artery occlusion. Both vertebral arteries contribute to the basilar artery. The common carotid arteries are patent bilaterally. The internal and external carotid arteries are patent bilaterally. There is no high-grade stenosis. There is no evidence of vessel occlusion. IMPRESSION:   1. Patent carotid and vertebral arteries. No evidence of acute dissection or occlusion. MRI BRAIN WO CONTRAST   Final Result   1. No evidence of acute infarct or other acute intracranial abnormality. 2. Moderate global volume loss with moderate chronic small vessel ischemic disease within the periventricular white matter. MRA NECK:      FINDINGS: The vertebral arteries are patent bilaterally and equal in size. No evidence of vertebral artery occlusion. Both vertebral arteries contribute to the basilar artery. The common carotid arteries are patent bilaterally. The internal and external carotid arteries are patent bilaterally. There is no high-grade stenosis. There is no evidence of vessel occlusion. IMPRESSION:   1. Patent carotid and vertebral arteries. No evidence of acute dissection or occlusion.       CT ABDOMEN PELVIS WO CONTRAST Additional Contrast? None   Final Result 1. Mild bilateral hydronephrosis with ureters dilated to the level of the urinary bladder of uncertain etiology. 2. Collapsed urinary bladder with Cha catheter in place. There is suggestion of diffuse bladder wall thickening. Further evaluation with cystoscopy may be indicated. 3. Moderate age-indeterminate compression fracture of the L1 vertebral body. US RENAL COMPLETE   Final Result      Mild left hydronephrosis and mild right hydroureter. No ultrasound evidence of a renal calculus. If the patient is symptomatic for renal colic, need for further evaluation with CT abdomen can be determined clinically. Assessment/Plan   1. JUANCARLOS - secondary to obstruction/urinary retention/hypovolemia   - continue LR 50mL/hr and urinary cath, monitor renal function, Cr improving 3.3 --> 2.8, if Cr continues to trend down tomorrow will be signed off by nephro and will follow up in outpatient  - Uprotein 435, Margarita 87, FeNa 4.0%, postrenal/obstructive JUANCARLOS  - uric acid elevated at 9.5, CK normal, rhabdo ruled out  - renal U/S revealed mild L hydronephrosis and mild R hydroureter  - follow up CT abd/pelvis wo contrast revealed mild bilateral hydronephrosis with dilated ureters as well as diffuse bladder wall thickening  - bladder scan > 930 after voiding 250 on 8/3, urinary retention    2. Anion gap metabolic acidosis  - resolved    3. Acute metabolic encephalopathy  - likely secondary to UTI and JUANCARLOS  - urine culture + for Klebsiella pneumoniae  - discontinue rocephin IVPB per ID  - follow hospitalist and ID recs    4. Occipital condyle & C1 fracture  - follow neurosurgery recs  - MRI brain and neck wo contrast performed, no acute infarct and patent vessels without dissection  - miami J collar worn at all times, cervical collar may be removed for eating/cleaning  - signed off, follow up outpatient in 6 weeks    5.  Bilateral hydronephrosis  - urology following, likely secondary to urinary retention    6. Gross hematuria  - urology following, may be 2/2 to rapid decompression  - monitor and manually irrigate cath q shift and prn    7.  COVID 19 infection  - per hospitalist      Thank you for allowing us to participate in care of [place] free to contact me   [facility] [address]  Office : [phone]  Fax :[phone]      Cr better   Good UO   Cha to cont   Dec IVF     Gallo Robison MD

## 2020-08-05 NOTE — PROGRESS NOTES
[facility] updated on plan of care. Information about CBI, neck brace, NG tube and nutrition provided, all questions answered.

## 2020-08-05 NOTE — PLAN OF CARE
Problem: Airway Clearance - Ineffective  Goal: Achieve or maintain patent airway  8/5/2020 0618 by Reine Jeans, RN  Outcome: Ongoing  8/4/2020 1743 by Jo Chaney RN  Outcome: Ongoing     Problem: Gas Exchange - Impaired  Goal: Absence of hypoxia  Outcome: Ongoing  Goal: Promote optimal lung function  8/5/2020 0618 by Reine Jeans, RN  Outcome: Ongoing  8/4/2020 1743 by Jo Chaney RN  Outcome: Ongoing     Problem: Breathing Pattern - Ineffective  Goal: Ability to achieve and maintain a regular respiratory rate  Outcome: Ongoing     Problem:  Body Temperature -  Risk of, Imbalanced  Goal: Ability to maintain a body temperature within defined limits  Outcome: Ongoing  Goal: Will regain or maintain usual level of consciousness  Outcome: Ongoing  Goal: Complications related to the disease process, condition or treatment will be avoided or minimized  Outcome: Ongoing     Problem: Isolation Precautions - Risk of Spread of Infection  Goal: Prevent transmission of infection  Outcome: Ongoing     Problem: Nutrition Deficits  Goal: Optimize nutrtional status  8/5/2020 0618 by Reine Jeans, RN  Outcome: Ongoing  8/4/2020 1743 by Jo Chaney RN  Outcome: Ongoing     Problem: Risk for Fluid Volume Deficit  Goal: Maintain normal heart rhythm  Outcome: Ongoing  Goal: Maintain absence of muscle cramping  Outcome: Ongoing  Goal: Maintain normal serum potassium, sodium, calcium, phosphorus, and pH  Outcome: Ongoing     Problem: Loneliness or Risk for Loneliness  Goal: Demonstrate positive use of time alone when socialization is not possible  Outcome: Ongoing     Problem: Fatigue  Goal: Verbalize increase energy and improved vitality  Outcome: Ongoing     Problem: Patient Education: Go to Patient Education Activity  Goal: Patient/Family Education  Outcome: Ongoing     Problem: Skin Integrity:  Goal: Will show no infection signs and symptoms  Description: Will show no infection signs and symptoms  Outcome: Ongoing  Goal: Absence of new skin breakdown  Description: Absence of new skin breakdown  Outcome: Ongoing     Problem: Falls - Risk of:  Goal: Will remain free from falls  Description: Will remain free from falls  Outcome: Ongoing  Goal: Absence of physical injury  Description: Absence of physical injury  Outcome: Ongoing     Problem: Confusion - Acute:  Goal: Absence of continued neurological deterioration signs and symptoms  Description: Absence of continued neurological deterioration signs and symptoms  Outcome: Ongoing  Goal: Mental status will be restored to baseline  Description: Mental status will be restored to baseline  Outcome: Ongoing     Problem: Discharge Planning:  Goal: Ability to perform activities of daily living will improve  Description: Ability to perform activities of daily living will improve  Outcome: Ongoing  Goal: Participates in care planning  Description: Participates in care planning  Outcome: Ongoing     Problem: Injury - Risk of, Physical Injury:  Goal: Will remain free from falls  Description: Will remain free from falls  Outcome: Ongoing  Goal: Absence of physical injury  Description: Absence of physical injury  Outcome: Ongoing     Problem: Mood - Altered:  Goal: Mood stable  Description: Mood stable  Outcome: Ongoing  Goal: Absence of abusive behavior  Description: Absence of abusive behavior  Outcome: Ongoing  Goal: Verbalizations of feeling emotionally comfortable while being cared for will increase  Description: Verbalizations of feeling emotionally comfortable while being cared for will increase  Outcome: Ongoing     Problem: Psychomotor Activity - Altered:  Goal: Absence of psychomotor disturbance signs and symptoms  Description: Absence of psychomotor disturbance signs and symptoms  Outcome: Ongoing     Problem: Sensory Perception - Impaired:  Goal: Demonstrations of improved sensory functioning will increase  Description: Demonstrations of improved sensory functioning will

## 2020-08-05 NOTE — PLAN OF CARE
Nutrition Problem #1: Inadequate oral intake  Intervention: Food and/or Nutrient Delivery: Continue NPO, Start Tube Feeding  Nutritional Goals: Patient to tolerate EN to meet 100% of nutrition needs

## 2020-08-05 NOTE — RESULT ENCOUNTER NOTE
Culture reviewed, to positive results. Notify patient of positive COVID-19 with appropriate instructions. She also needs to take cephalexin 500 mg 3 times a day for 7 days.

## 2020-08-05 NOTE — PROGRESS NOTES
Hospitalist Progress Note      PCP: Ninoska Berman MD    Date of Admission: 8/3/2020    Chief Complaint: Fall, [facility] Course: Patient is 61-year-old female resident of assisted living history of hypothyroid, hypertension presented to [facility] ED after she had a fall. On my evaluation patient is confused so most of the history was obtained from the charts and talking to family over the phone Adams Vazquez and Katina Link. Per ED notes  Patient was last seen at 10:00 by the assisted living nurse. She was sitting in a chair when they came back to see her at 12 midnight they found her on the floor. Complain of left hip and knee pain.     On arrival to ED hemodynamically stable. Blood work significant for creatinine 5.3, CO2 11, potassium 6.4 troponin 0 0.22. EKG normal sinus rhythm with some PACs.     Imaging including CT head, cervical spine small right frontal scalp contusion. Comminuted displaced left occipital condyle and C1 lateral mass fracture. Prominence of the CSF space along the right convexity may related to patient positioning as opposed to subdural hygroma. Age-related cerebral volume loss. X-ray left knee no fracture or malalignment. X-ray hip no evidence of acute finding in the pelvis. Subjective: I want to eat I am hungry. Hard of hearing. Denies any pain, SOB, abdominal pain. Corpak was not placed successful yesterday. Will try to put dubhoff today.      Medications:  Reviewed    Infusion Medications    lactated ringers 50 mL/hr at 08/05/20 0544    dextrose       Scheduled Medications    irrigation builder   Irrigation Once    heparin (porcine)  5,000 Units Subcutaneous 3 times per day    levothyroxine  125 mcg Oral Daily    sodium chloride flush  10 mL Intravenous 2 times per day    cefTRIAXone (ROCEPHIN) IV  1 g Intravenous Q24H    insulin lispro  0-6 Units Subcutaneous TID WC    insulin lispro  0-3 Units Subcutaneous Nightly     PRN Meds: acetaminophen **OR** acetaminophen, sodium chloride flush, acetaminophen **OR** acetaminophen, polyethylene glycol, promethazine **OR** ondansetron, [Held by provider] magnesium sulfate, glucose, dextrose, glucagon (rDNA), dextrose, perflutren lipid microspheres, morphine      Intake/Output Summary (Last 24 hours) at 8/5/2020 1227  Last data filed at 8/5/2020 0730  Gross per 24 hour   Intake 10 ml   Output 875 ml   Net -865 ml       Physical Exam Performed:    BP (!) 158/90   Pulse 93   Temp 95.5 °F (35.3 °C) (Axillary)   Resp 18   Ht 5' 2\" (1.575 m)   Wt 157 lb 6.5 oz (71.4 kg)   SpO2 93%   BMI 28.79 kg/m²     General appearance: Alert, asking for food. HEENT: Pupils equal, round, and reactive to light. Conjunctivae/corneas clear. Neck: Susanne J collar present  Respiratory:  Normal respiratory effort. Clear to auscultation, bilaterally without Rales/Wheezes/Rhonchi. Cardiovascular: Regular rate and rhythm with normal S1/S2 without murmurs, rubs or gallops. Abdomen: Soft, non-tender, non-distended with normal bowel sounds. Musculoskeletal: No clubbing, cyanosis or edema bilaterally. Full range of motion without deformity. Skin: Skin color, texture, turgor normal.  No rashes or lesions. Neurologic:  Neurovascularly intact without any focal sensory/motor deficits.  Cranial nerves: II-XII intact, grossly non-focal.  Psychiatric: Alert and oriented to self  Capillary Refill: Brisk,< 3 seconds   Peripheral Pulses: +2 palpable, equal bilaterally       Labs:   Recent Labs     08/03/20  1005 08/04/20  0830 08/05/20  0456   WBC 4.2 4.7 5.1   HGB 10.6* 11.0* 10.4*   HCT 32.1* 32.6* 30.8*    132* 130*     Recent Labs     08/04/20  0508 08/04/20  0830 08/05/20  0456    137 139   K 5.4* 4.9 4.9  4.9    101 104   CO2 28 28 21   BUN 55* 57* 48*   CREATININE 3.3* 3.3* 2.8*   CALCIUM 8.4 8.5 8.5   PHOS  --  3.1 3.2     Recent Labs     08/03/20  1005 08/04/20  0508 08/05/20  0456   AST 23 27 28   ALT 8* 8* 10   BILITOT 0.4 0.3 0.3 ALKPHOS 61 61 62     Recent Labs     08/03/20  1005 08/04/20  0508 08/05/20  0456   INR 1.03 1.03 1.01     Recent Labs     08/03/20  1005  08/04/20  1817 08/05/20  0251 08/05/20  0447   CKTOTAL 182  --   --   --   --    TROPONINI  --    < > 0.19* 0.15* 0.15*    < > = values in this interval not displayed. Urinalysis:      Lab Results   Component Value Date    NITRU Negative 08/03/2020    WBCUA 335 08/03/2020    BACTERIA Rare 01/07/2020    RBCUA 77 08/03/2020    BLOODU LARGE 08/03/2020    SPECGRAV 1.013 08/03/2020    GLUCOSEU Negative 08/03/2020    GLUCOSEU NEGATIVE 05/17/2010       Radiology:  MRA NECK WO CONTRAST   Final Result   1. No evidence of acute infarct or other acute intracranial abnormality. 2. Moderate global volume loss with moderate chronic small vessel ischemic disease within the periventricular white matter. MRA NECK:      FINDINGS: The vertebral arteries are patent bilaterally and equal in size. No evidence of vertebral artery occlusion. Both vertebral arteries contribute to the basilar artery. The common carotid arteries are patent bilaterally. The internal and external carotid arteries are patent bilaterally. There is no high-grade stenosis. There is no evidence of vessel occlusion. IMPRESSION:   1. Patent carotid and vertebral arteries. No evidence of acute dissection or occlusion. MRI BRAIN WO CONTRAST   Final Result   1. No evidence of acute infarct or other acute intracranial abnormality. 2. Moderate global volume loss with moderate chronic small vessel ischemic disease within the periventricular white matter. MRA NECK:      FINDINGS: The vertebral arteries are patent bilaterally and equal in size. No evidence of vertebral artery occlusion. Both vertebral arteries contribute to the basilar artery. The common carotid arteries are patent bilaterally. The internal and external carotid arteries are patent bilaterally. There is no high-grade stenosis.  There is no evidence of vessel occlusion. IMPRESSION:   1. Patent carotid and vertebral arteries. No evidence of acute dissection or occlusion. CT ABDOMEN PELVIS WO CONTRAST Additional Contrast? None   Final Result      1. Mild bilateral hydronephrosis with ureters dilated to the level of the urinary bladder of uncertain etiology. 2. Collapsed urinary bladder with Cha catheter in place. There is suggestion of diffuse bladder wall thickening. Further evaluation with cystoscopy may be indicated. 3. Moderate age-indeterminate compression fracture of the L1 vertebral body. US RENAL COMPLETE   Final Result      Mild left hydronephrosis and mild right hydroureter. No ultrasound evidence of a renal calculus. If the patient is symptomatic for renal colic, need for further evaluation with CT abdomen can be determined clinically. Assessment/Plan:    Active Hospital Problems    Diagnosis Date Noted    COVID-19 [U07.1] 08/04/2020    Acute metabolic encephalopathy [S97.64] 08/03/2020     #COVID-19 pneumonia  Will check inflammatory markers CRP, ferritin, d-dimer  Patient is noted respiratory distress. Will hold off any dexamethasone. ID signed off    #Acute metabolic encephalopathy: Slightly improved  Multifactorial UTI, renal failure    #JUANCARLOS multifactorial prerenal and postrenal  PVR was reported to be 900 cc  Cr 5.3>3.3>2.8>2.2  Cont IVF as patient is NPO due to failed swallow eval. Will stop after she gets dubhoff. Renal ultrasound mild left hydronephrosis and mild right hydroureter. #UTI with Klebsiella  Continue IV Rocephin day 4/5. #Hematuria of unclear etiology. Consult from urology reviewed recommended CBI may need cysto      #Elevated troponin in setting of JUANCARLOS. Troponin trending down. Echocardiogram pending    #Fall unsure mechanical versus other reason. Follow-up on echocardiogram.  MRA and MRI reviewed no aneurysm or stroke.     #Traumatic comminuted and

## 2020-08-06 ENCOUNTER — APPOINTMENT (OUTPATIENT)
Dept: GENERAL RADIOLOGY | Age: 85
DRG: 668 | End: 2020-08-06
Attending: INTERNAL MEDICINE
Payer: COMMERCIAL

## 2020-08-06 LAB
A/G RATIO: 1.1 (ref 1.1–2.2)
ALBUMIN SERPL-MCNC: 3.1 G/DL (ref 3.4–5)
ALP BLD-CCNC: 65 U/L (ref 40–129)
ALT SERPL-CCNC: 10 U/L (ref 10–40)
ANION GAP SERPL CALCULATED.3IONS-SCNC: 9 MMOL/L (ref 3–16)
APTT: 33 SEC (ref 24.2–36.2)
AST SERPL-CCNC: 24 U/L (ref 15–37)
BASOPHILS ABSOLUTE: 0.1 K/UL (ref 0–0.2)
BASOPHILS RELATIVE PERCENT: 1.1 %
BILIRUB SERPL-MCNC: 0.3 MG/DL (ref 0–1)
BUN BLDV-MCNC: 38 MG/DL (ref 7–20)
CALCIUM SERPL-MCNC: 8.3 MG/DL (ref 8.3–10.6)
CHLORIDE BLD-SCNC: 104 MMOL/L (ref 99–110)
CO2: 25 MMOL/L (ref 21–32)
CREAT SERPL-MCNC: 2.2 MG/DL (ref 0.6–1.2)
D DIMER: 372 NG/ML DDU (ref 0–229)
EOSINOPHILS ABSOLUTE: 0.2 K/UL (ref 0–0.6)
EOSINOPHILS RELATIVE PERCENT: 2.9 %
FIBRINOGEN: 292 MG/DL (ref 200–397)
GFR AFRICAN AMERICAN: 25
GFR NON-AFRICAN AMERICAN: 21
GLOBULIN: 2.7 G/DL
GLUCOSE BLD-MCNC: 114 MG/DL (ref 70–99)
GLUCOSE BLD-MCNC: 91 MG/DL (ref 70–99)
GLUCOSE BLD-MCNC: 97 MG/DL (ref 70–99)
GLUCOSE BLD-MCNC: 97 MG/DL (ref 70–99)
HCT VFR BLD CALC: 30.3 % (ref 36–48)
HEMOGLOBIN: 10.2 G/DL (ref 12–16)
INR BLD: 0.96 (ref 0.86–1.14)
LYMPHOCYTES ABSOLUTE: 0.9 K/UL (ref 1–5.1)
LYMPHOCYTES RELATIVE PERCENT: 17.8 %
MCH RBC QN AUTO: 28.9 PG (ref 26–34)
MCHC RBC AUTO-ENTMCNC: 33.6 G/DL (ref 31–36)
MCV RBC AUTO: 85.9 FL (ref 80–100)
MONOCYTES ABSOLUTE: 0.6 K/UL (ref 0–1.3)
MONOCYTES RELATIVE PERCENT: 11.3 %
NEUTROPHILS ABSOLUTE: 3.5 K/UL (ref 1.7–7.7)
NEUTROPHILS RELATIVE PERCENT: 66.9 %
PDW BLD-RTO: 17.6 % (ref 12.4–15.4)
PERFORMED ON: ABNORMAL
PERFORMED ON: NORMAL
PERFORMED ON: NORMAL
PHOSPHORUS: 2.8 MG/DL (ref 2.5–4.9)
PLATELET # BLD: 140 K/UL (ref 135–450)
PMV BLD AUTO: 8.2 FL (ref 5–10.5)
POTASSIUM REFLEX MAGNESIUM: 4.6 MMOL/L (ref 3.5–5.1)
PROTHROMBIN TIME: 11.1 SEC (ref 10–13.2)
RBC # BLD: 3.53 M/UL (ref 4–5.2)
REPORT: NORMAL
SARS-COV-2: NOT DETECTED
SODIUM BLD-SCNC: 138 MMOL/L (ref 136–145)
THIS TEST SENT TO: NORMAL
TOTAL PROTEIN: 5.8 G/DL (ref 6.4–8.2)
WBC # BLD: 5.3 K/UL (ref 4–11)

## 2020-08-06 PROCEDURE — 2060000000 HC ICU INTERMEDIATE R&B

## 2020-08-06 PROCEDURE — 85379 FIBRIN DEGRADATION QUANT: CPT

## 2020-08-06 PROCEDURE — 84478 ASSAY OF TRIGLYCERIDES: CPT

## 2020-08-06 PROCEDURE — 85384 FIBRINOGEN ACTIVITY: CPT

## 2020-08-06 PROCEDURE — 84100 ASSAY OF PHOSPHORUS: CPT

## 2020-08-06 PROCEDURE — 2580000003 HC RX 258: Performed by: INTERNAL MEDICINE

## 2020-08-06 PROCEDURE — 51700 IRRIGATION OF BLADDER: CPT

## 2020-08-06 PROCEDURE — 6360000002 HC RX W HCPCS: Performed by: INTERNAL MEDICINE

## 2020-08-06 PROCEDURE — 92526 ORAL FUNCTION THERAPY: CPT

## 2020-08-06 PROCEDURE — 80053 COMPREHEN METABOLIC PANEL: CPT

## 2020-08-06 PROCEDURE — 85025 COMPLETE CBC W/AUTO DIFF WBC: CPT

## 2020-08-06 PROCEDURE — 74018 RADEX ABDOMEN 1 VIEW: CPT

## 2020-08-06 PROCEDURE — 85730 THROMBOPLASTIN TIME PARTIAL: CPT

## 2020-08-06 PROCEDURE — 85610 PROTHROMBIN TIME: CPT

## 2020-08-06 RX ORDER — SODIUM CHLORIDE, SODIUM LACTATE, POTASSIUM CHLORIDE, CALCIUM CHLORIDE 600; 310; 30; 20 MG/100ML; MG/100ML; MG/100ML; MG/100ML
INJECTION, SOLUTION INTRAVENOUS CONTINUOUS
Status: DISCONTINUED | OUTPATIENT
Start: 2020-08-06 | End: 2020-08-07

## 2020-08-06 RX ORDER — METOCLOPRAMIDE HYDROCHLORIDE 5 MG/ML
10 INJECTION INTRAMUSCULAR; INTRAVENOUS ONCE
Status: COMPLETED | OUTPATIENT
Start: 2020-08-06 | End: 2020-08-06

## 2020-08-06 RX ADMIN — HEPARIN SODIUM 5000 UNITS: 5000 INJECTION INTRAVENOUS; SUBCUTANEOUS at 22:30

## 2020-08-06 RX ADMIN — SODIUM CHLORIDE, POTASSIUM CHLORIDE, SODIUM LACTATE AND CALCIUM CHLORIDE: 600; 310; 30; 20 INJECTION, SOLUTION INTRAVENOUS at 12:22

## 2020-08-06 RX ADMIN — SODIUM CHLORIDE, POTASSIUM CHLORIDE, SODIUM LACTATE AND CALCIUM CHLORIDE: 600; 310; 30; 20 INJECTION, SOLUTION INTRAVENOUS at 02:55

## 2020-08-06 RX ADMIN — METOCLOPRAMIDE 10 MG: 5 INJECTION, SOLUTION INTRAMUSCULAR; INTRAVENOUS at 12:22

## 2020-08-06 RX ADMIN — HEPARIN SODIUM 5000 UNITS: 5000 INJECTION INTRAVENOUS; SUBCUTANEOUS at 06:27

## 2020-08-06 RX ADMIN — SODIUM CHLORIDE, POTASSIUM CHLORIDE, SODIUM LACTATE AND CALCIUM CHLORIDE: 600; 310; 30; 20 INJECTION, SOLUTION INTRAVENOUS at 10:41

## 2020-08-06 RX ADMIN — CEFTRIAXONE 1 G: 1 INJECTION, POWDER, FOR SOLUTION INTRAMUSCULAR; INTRAVENOUS at 09:48

## 2020-08-06 RX ADMIN — HEPARIN SODIUM 5000 UNITS: 5000 INJECTION INTRAVENOUS; SUBCUTANEOUS at 14:38

## 2020-08-06 ASSESSMENT — PAIN SCALES - GENERAL
PAINLEVEL_OUTOF10: 0

## 2020-08-06 NOTE — PLAN OF CARE
Problem: Gas Exchange - Impaired  Goal: Absence of hypoxia  8/6/2020 0532 by Sherryle Robinsons, RN  Outcome: Ongoing  Note: Pt on room air with O2 saturations in the high 90's. No complaints of SOB. Will continue to monitor     Problem: Falls - Risk of:  Goal: Will remain free from falls  Description: Will remain free from falls  Outcome: Ongoing  Note: Pt is a Fall Risk. See Preston Naik Fall Risk Score. Pt bed in low position and side rails up. Call light and belongings in reach. Pt encouraged to call for assistance. Will continue with hourly rounds for PO intake, pain needs, toileting, and repositioning as needed.

## 2020-08-06 NOTE — PROGRESS NOTES
This nurse spoke to pt's son and provided updates on pt's status, answered questions to the best of this nurse's ability. Encouraged pt to speak with son on the phone, replaced hearing aid batteries to facilitate improved communication. Pt states she is unable to hear son speaking.

## 2020-08-06 NOTE — PROGRESS NOTES
This nurse spoke to pt's son Gamal Johnson, update given on pt status and all questions answered to the best of this nurse's ability. Pt's son verbalizes understanding and agrees to plan.

## 2020-08-06 NOTE — PLAN OF CARE
Problem: Body Temperature -  Risk of, Imbalanced  Goal: Ability to maintain a body temperature within defined limits  Outcome: Ongoing  Note: Pt remains afebrile this shift. No s/s of N/V, chills, diaphoresis or pain. Problem: Nutrition Deficits  Goal: Optimize nutrtional status  Outcome: Ongoing     Problem: Skin Integrity:  Goal: Absence of new skin breakdown  Description: Absence of new skin breakdown  Outcome: Ongoing  Note: No new skin breakdown noted this shift. Pt turned every 2 hours or more often as needed, specialty mattress in place. Problem: Falls - Risk of:  Goal: Will remain free from falls  Description: Will remain free from falls  8/6/2020 1229 by Aster Vazquez RN  Outcome: Ongoing  Note: Pt remains free from falls this shift. Bed low and locked, bed alarm on, call light within reach,  non-skid footwear in place. Pt oriented to room and call light frequently. Needs met.

## 2020-08-06 NOTE — PROGRESS NOTES
Nephrology Note                                                                                                                                                                                                                                                                                                                                                               Office : [phone]     Fax :[phone]      Patient's Name: Dario Hinton  8:32 AM  8/6/2020    Reason for Consult:  Elevated Cr  Requesting Physician:  Kyler Berrios MD      Chief Complaint:  Fall    History of Present Ilness:    Dario Hinton is a 80 y.o. female with PMH significant for carcinoma in situ of colon, pernicious anemia, and hypothyroidism. She presented to [facility] ED because she fell and was found on the floor. She is complaining of left hip and knee pain, as well as pain at the back of her head but she denies loss of consciousness. Denies chest pain, abdominal pain, and numbness or tingling. Denies being on blood thinner. According to her son, she ambulates a little bit with a walker, but mainly uses a wheelchair. He reports that she has been a little more confused in the last week. 8/6: Per nurse, she is ok this morning, making plenty of urine which is draining well but cherry red in color. Currently on LR 50ml/h. Pt still NPO, and Corpak attempted 2X but unsuccessful, palliative care consulted but has not seen her yet. 8/5: Pt not seen today due to recent COVID diagnosis to limit exposure. Per nurse, pt was unable to tolerate fluids by mouth yesterday and this continues today. Speech rec spoon fulls of water and ice chips for now. Pt had overt s/s of pharyngeal dysphagia at bedside and was unable to complete full assessment of swallow via instrumental evaluation secondary to + covid status.  Want to per her on a corpak and talking with family but suspect they wont want to do it, she will most likely be put on a diet for comfort. She is not urinating well and still has large clots, per nurse urology says pt needs cystoscopy. Pt is more alert, but still confused. 8/4: Pt not seen today due to recent COVID diagnosis to limit exposure. Per nurse, pt still making plenty of urine and the color has improved, now a light pink and with decreased clots. Pt is opening eyes more often now, had her L hearing aid put in by nurse and was able to deny and feelings of pain and told the nurse her name. Past Medical History:   Diagnosis Date    Carcinoma in situ of colon 1998    Diarrhea     Esophageal reflux     Other extrapyramidal disease and abnormal movement disorder     Pernicious anemia     S/P colonoscopy 4/09    OK - no further eval.    Unspecified cataract     Unspecified hypothyroidism     Unspecified pruritic disorder        Past Surgical History:   Procedure Laterality Date    COLECTOMY      partial    COLONOSCOPY  4/09    OK - no further eval    HYSTERECTOMY         No family history on file. reports that she has never smoked. She has never used smokeless tobacco. She reports that she does not drink alcohol or use drugs.     Allergies:  Bee venom    Current Medications:    acetaminophen (TYLENOL) tablet 650 mg, Q6H PRN    Or  acetaminophen (TYLENOL) suppository 650 mg, Q6H PRN  heparin (porcine) injection 5,000 Units, 3 times per day  lactated ringers infusion, Continuous  levothyroxine (SYNTHROID) tablet 125 mcg, Daily  sodium chloride flush 0.9 % injection 10 mL, 2 times per day  sodium chloride flush 0.9 % injection 10 mL, PRN  acetaminophen (TYLENOL) tablet 650 mg, Q6H PRN    Or  acetaminophen (TYLENOL) suppository 650 mg, Q6H PRN  polyethylene glycol (GLYCOLAX) packet 17 g, Daily PRN  promethazine (PHENERGAN) tablet 12.5 mg, Q6H PRN    Or  ondansetron (ZOFRAN) injection 4 mg, Q6H PRN  [Held by provider] magnesium sulfate 2 g in 50 mL IVPB premix, PRN  cefTRIAXone (ROCEPHIN) 1 g IVPB in 50 mL D5W minibag, Q24H  insulin lispro (1 Unit Dial) 0-6 Units, TID WC  insulin lispro (1 Unit Dial) 0-3 Units, Nightly  glucose (GLUTOSE) 40 % oral gel 15 g, PRN  dextrose 50 % IV solution, PRN  glucagon (rDNA) injection 1 mg, PRN  dextrose 5 % solution, PRN  perflutren lipid microspheres (DEFINITY) injection 1.65 mg, ONCE PRN        Review of Systems:   14 point ROS obtained but were negative except mentioned in HPI      Physical exam:     Vitals:  BP (!) 142/84   Pulse 110   Temp 97.1 °F (36.2 °C) (Axillary)   Resp 20   Ht 5' 2\" (1.575 m)   Wt 157 lb 6.5 oz (71.4 kg)   SpO2 93%   BMI 28.79 kg/m²   Due to limiting exposure to COVID-19, patient was not interviewed or examined, at this time. I have personally reviewed the reports and images of labs, radiological studies, current and old medical records. Data:   Labs:  CBC:   Recent Labs     08/04/20  0830 08/05/20 0456 08/06/20  0329   WBC 4.7 5.1 5.3   HGB 11.0* 10.4* 10.2*   * 130* 140     BMP:    Recent Labs     08/04/20  0830 08/05/20  0456 08/06/20  0329    139 138   K 4.9 4.9  4.9 4.6    104 104   CO2 28 21 25   BUN 57* 48* 38*   CREATININE 3.3* 2.8* 2.2*   GLUCOSE 146* 126* 91     Ca/Mg/Phos:   Recent Labs     08/04/20  0830 08/05/20  0456 08/06/20  0329   CALCIUM 8.5 8.5 8.3   PHOS 3.1 3.2 2.8     Hepatic:   Recent Labs     08/04/20  0508 08/05/20  0456 08/06/20  0329   AST 27 28 24   ALT 8* 10 10   BILITOT 0.3 0.3 0.3   ALKPHOS 61 62 65     Troponin:   Recent Labs     08/04/20  1817 08/05/20  0251 08/05/20  0447   TROPONINI 0.19* 0.15* 0.15*     BNP: No results for input(s): BNP in the last 72 hours. Lipids: No results for input(s): CHOL, TRIG, HDL, LDLCALC, LABVLDL in the last 72 hours. ABGs: No results for input(s): PHART, PO2ART, LWT0DHQ in the last 72 hours.   INR:   Recent Labs     08/04/20  0508 08/05/20  0456 08/06/20  0329   INR 1.03 1.01 0.96     UA:  No results for input(s): BRANDI Forrest Vergie Elms, Aniket Crum, UROBILINOGEN, NITRU, LEUKOCYTESUR, Olevia Charles in the last 72 hours. Urine Microscopic:   No results for input(s): LABCAST, BACTERIA, COMU, HYALCAST, WBCUA, RBCUA, EPIU in the last 72 hours. Urine Culture:   No results for input(s): LABURIN in the last 72 hours. Urine Chemistry:   Recent Labs     08/03/20  1410   LABCREA 74.4   PROTEINUR 435.00*   NAUR 87         IMAGING:  XR ABDOMEN (KUB) (SINGLE AP VIEW)   Final Result   Impression: Nasogastric tube is coiled within the distal esophagus. MRA NECK WO CONTRAST   Final Result   1. No evidence of acute infarct or other acute intracranial abnormality. 2. Moderate global volume loss with moderate chronic small vessel ischemic disease within the periventricular white matter. MRA NECK:      FINDINGS: The vertebral arteries are patent bilaterally and equal in size. No evidence of vertebral artery occlusion. Both vertebral arteries contribute to the basilar artery. The common carotid arteries are patent bilaterally. The internal and external carotid arteries are patent bilaterally. There is no high-grade stenosis. There is no evidence of vessel occlusion. IMPRESSION:   1. Patent carotid and vertebral arteries. No evidence of acute dissection or occlusion. MRI BRAIN WO CONTRAST   Final Result   1. No evidence of acute infarct or other acute intracranial abnormality. 2. Moderate global volume loss with moderate chronic small vessel ischemic disease within the periventricular white matter. MRA NECK:      FINDINGS: The vertebral arteries are patent bilaterally and equal in size. No evidence of vertebral artery occlusion. Both vertebral arteries contribute to the basilar artery. The common carotid arteries are patent bilaterally. The internal and external carotid arteries are patent bilaterally. There is no high-grade stenosis. There is no evidence of vessel occlusion. IMPRESSION:   1. Patent carotid and vertebral arteries. No evidence of acute dissection or occlusion. CT ABDOMEN PELVIS WO CONTRAST Additional Contrast? None   Final Result      1. Mild bilateral hydronephrosis with ureters dilated to the level of the urinary bladder of uncertain etiology. 2. Collapsed urinary bladder with Cha catheter in place. There is suggestion of diffuse bladder wall thickening. Further evaluation with cystoscopy may be indicated. 3. Moderate age-indeterminate compression fracture of the L1 vertebral body. US RENAL COMPLETE   Final Result      Mild left hydronephrosis and mild right hydroureter. No ultrasound evidence of a renal calculus. If the patient is symptomatic for renal colic, need for further evaluation with CT abdomen can be determined clinically. Assessment/Plan   1. JUANCARLOS - secondary to obstruction/urinary retention/hypovolemia   - Stop IVF, check renal function tomorrow, Cr continuing to improve 2.8 --> 2.2  - Uprotein 435, Margarita 87, FeNa 4.0%, postrenal/obstructive JUANCARLOS  - uric acid elevated at 9.5, CK normal, rhabdo ruled out  - renal U/S revealed mild L hydronephrosis and mild R hydroureter  - follow up CT abd/pelvis wo contrast revealed mild bilateral hydronephrosis with dilated ureters as well as diffuse bladder wall thickening  - bladder scan > 930 after voiding 250 on 8/3, urinary retention    2. Anion gap metabolic acidosis  - resolved    3. Acute metabolic encephalopathy  - likely secondary to UTI and JUANCARLOS  - urine culture + for Klebsiella pneumoniae  - on rocephin IVPB  - slightly improved  - follow hospitalist and ID recs    4. Occipital condyle & C1 fracture  - follow neurosurgery recs  - MRI brain and neck wo contrast performed, no acute infarct and patent vessels without dissection  - [place] J collar worn at all times, cervical collar may be removed for eating/cleaning  - signed off, follow up outpatient in 6 weeks    5.  Bilateral hydronephrosis  - urology following, likely secondary to urinary retention    6. Gross hematuria  - urology following, may be 2/2 to retention  - monitor and manually irrigate cath q shift and prn  - monitor for a another few days vs OR for cystoscopy    7.  COVID 19 infection  - per hospitalist      Thank you for allowing us to participate in care of [place] free to contact me   [facility] [address]  Office : [phone]  Fax :[phone]      Good UO   Cr better   D/c IVF     Chadwick Coughlin

## 2020-08-06 NOTE — CARE COORDINATION
Case Management Assessment           Daily Note                 Date/ Time of Note: 8/6/2020 2:06 PM         Note completed by: Yonatan Mcpherson    Patient Name: Dilcia Alanis  YOB: 1922    Diagnosis:Acute metabolic encephalopathy [D06.70]  Patient Admission Status: Inpatient    Date of Admission:8/3/2020  8:55 AM Length of Stay: 3 GLOS:      Current Plan of Care: Patient will need new SNF, still has corepak, COVID +  ________________________________________________________________________________________  PT AM-PAC: 6 / 24 per last evaluation on:     OT AM-PAC: 10 / 24 per last evaluation on:     DME Needs for discharge: none noted  ________________________________________________________________________________________  Discharge Plan: SNF: TBD    Tentative discharge date: 8/8/2020    Current barriers to discharge: medical complications, [place], COVID+    Referrals completed: SNF: [address] [facility] [address]    Resources/ information provided: SNF List  ________________________________________________________________________________________  Case Management Notes:Referred to patient for SNF on d/c. Facilities that are able to accept include: [address] [facility] [address], [facility], and [address] [facility] called and message left with update. Mireya Starks and her family were provided with choice of provider; she and her family are in agreement with the discharge plan.     Care Transition Patient: KAYLEEN Epstein, FELICIA-S  [facility]  Case Management Department  Ph: [phone]

## 2020-08-06 NOTE — PROGRESS NOTES
Urology Attending Progress Note      Subjective: confused. Hungry     Vitals:  BP (!) 142/84   Pulse 110   Temp 97.1 °F (36.2 °C) (Axillary)   Resp 20   Ht 5' 2\" (1.575 m)   Wt 157 lb 6.5 oz (71.4 kg)   SpO2 93%   BMI 28.79 kg/m²   Temp  Av.8 °F (36 °C)  Min: 95.5 °F (35.3 °C)  Max: 97.9 °F (36.6 °C)    Intake/Output Summary (Last 24 hours) at 2020 0930  Last data filed at 2020 0502  Gross per 24 hour   Intake --   Output 1375 ml   Net -1375 ml       Exam: abd soft. Newman draining clear urine on minimal CBI    Labs:  WBC:    Lab Results   Component Value Date    WBC 5.3 2020     Hemoglobin/Hematocrit:    Lab Results   Component Value Date    HGB 10.2 2020    HCT 30.3 2020     BMP:    Lab Results   Component Value Date     2020    K 4.6 2020     2020    CO2 25 2020    BUN 38 2020    LABALBU 3.1 2020    CREATININE 2.2 2020    CALCIUM 8.3 2020    GFRAA 25 2020    GFRAA >60 2012    LABGLOM 21 2020     PT/INR:    Lab Results   Component Value Date    PROTIME 11.1 2020    INR 0.96 2020     PTT:    Lab Results   Component Value Date    APTT 33.0 2020   [APTT        Urine Culture:  Klebsiella       Antibiotic Therapy:  Rocephin     Imaging: CT 8/3/20:      1. Mild bilateral hydronephrosis with ureters dilated to the level of the urinary bladder of uncertain etiology. 2. Collapsed urinary bladder with Newman catheter in place. There is suggestion of diffuse bladder wall thickening. Further evaluation with cystoscopy may be indicated. 3. Moderate age-indeterminate compression fracture of the L1 vertebral body         Impression/Plan: 79 yo F with gross hematuria, JUANCARLOS and mild bilateral hydro      -newman draining clear urine on minimal CBI.  Titrate CBI to keep urine clear.   -Cr continues to improve @ 2.2 from  2.8   -H/H stable @ 10.2/30.3  -continue abx  -will continue conservative medical management for now     Lissett Ro, APRN - CNP

## 2020-08-06 NOTE — CONSULTS
[facility]  Palliative Medicine Consultation Note    Date Of Admission:8/3/2020  Date of consult: 08/06/20  Seen by [facility] in the past:  No    Recommendations:        Pt not seen d/t COVID(+) status. Per nursing, remains altered and confused and would not be able to goals of care discussion. Per notes, Sesar Taylor, son, is HCPOA, paperwork of which is being faxed as below. After discussion with Terrell Lopez, he states that he has spoken with his siblings regarding their mother and they are in agreement that because of her current issues that are non-operable (C1 fracture) per neurosurgery that she would not want any aggressive measures to keep her alive. When discussing code status options, Terrell Lopez was in agreement that she would not want to be resuscitated in the event of a cardiac arrest and would not want to be intubated. CODE STATUS changed to Limitedx4 (DNR/DNI)    They would, however, like additional work-up to see if there any non-aggressive treatments for her kidney and bladder issues. We also discussed her nutrition and inability to swallow and well as the difficulty placing an NG tube. At this point in time, Terrell Lopez stated that the family would want her to undergo a PEG tube should the need arise so that she would not go hungry. The family were awaiting further information from the above work-up, but would be interested in pursuing hospice care after this discharge. Call made to [facility] to confirm code status and advanced care planning documents. They confirmed that Sesar Taylor is the [facility] and will be faxing the paperwork today. They also confirmed that she has a DNR order on file which they will also fax to us. 1. Goals of Care/Advanced Care planning/Code status: DNR/DNI (Limitedx4)  2. Pain: Medicated as needed for c/o pain per the advanced dementia pain scale. 3. SOB: Satting well on room air  4. AMS: likely 2/2 acute metabolic encephalopathy in setting of UTI on JUANCARLOS  5. History:        Diagnosis Date    Carcinoma in situ of colon 1998    Diarrhea     Esophageal reflux     Other extrapyramidal disease and abnormal movement disorder     Pernicious anemia     S/P colonoscopy 4/09    OK - no further eval.    Unspecified cataract     Unspecified hypothyroidism     Unspecified pruritic disorder        Past Surgical History:        Procedure Laterality Date    COLECTOMY      partial    COLONOSCOPY  4/09    OK - no further eval    HYSTERECTOMY       Current Medications:    Current Facility-Administered Medications: acetaminophen (TYLENOL) tablet 650 mg, 650 mg, Oral, Q6H PRN **OR** acetaminophen (TYLENOL) suppository 650 mg, 650 mg, Rectal, Q6H PRN  heparin (porcine) injection 5,000 Units, 5,000 Units, Subcutaneous, 3 times per day  lactated ringers infusion, , Intravenous, Continuous  levothyroxine (SYNTHROID) tablet 125 mcg, 125 mcg, Oral, Daily  sodium chloride flush 0.9 % injection 10 mL, 10 mL, Intravenous, 2 times per day  sodium chloride flush 0.9 % injection 10 mL, 10 mL, Intravenous, PRN  acetaminophen (TYLENOL) tablet 650 mg, 650 mg, Oral, Q6H PRN **OR** acetaminophen (TYLENOL) suppository 650 mg, 650 mg, Rectal, Q6H PRN  polyethylene glycol (GLYCOLAX) packet 17 g, 17 g, Oral, Daily PRN  promethazine (PHENERGAN) tablet 12.5 mg, 12.5 mg, Oral, Q6H PRN **OR** ondansetron (ZOFRAN) injection 4 mg, 4 mg, Intravenous, Q6H PRN  [Held by provider] magnesium sulfate 2 g in 50 mL IVPB premix, 2 g, Intravenous, PRN  cefTRIAXone (ROCEPHIN) 1 g IVPB in 50 mL D5W minibag, 1 g, Intravenous, Q24H  insulin lispro (1 Unit Dial) 0-6 Units, 0-6 Units, Subcutaneous, TID WC  insulin lispro (1 Unit Dial) 0-3 Units, 0-3 Units, Subcutaneous, Nightly  glucose (GLUTOSE) 40 % oral gel 15 g, 15 g, Oral, PRN  dextrose 50 % IV solution, 12.5 g, Intravenous, PRN  glucagon (rDNA) injection 1 mg, 1 mg, Intramuscular, PRN  dextrose 5 % solution, 100 mL/hr, Intravenous, PRN  perflutren lipid 10.2 08/06/2020    HCT 30.3 08/06/2020     08/06/2020    MCV 85.9 08/06/2020    MCH 28.9 08/06/2020    MCHC 33.6 08/06/2020    RDW 17.6 08/06/2020    SEGSPCT 66.1 01/05/2012    LYMPHOPCT 17.8 08/06/2020    MONOPCT 11.3 08/06/2020    EOSPCT 3.2 01/05/2012    BASOPCT 1.1 08/06/2020    MONOSABS 0.6 08/06/2020    LYMPHSABS 0.9 08/06/2020    EOSABS 0.2 08/06/2020    BASOSABS 0.1 08/06/2020    DIFFTYPE Auto-K 01/05/2012     BMP:    Lab Results   Component Value Date     08/06/2020    K 4.6 08/06/2020     08/06/2020    CO2 25 08/06/2020    BUN 38 08/06/2020    LABALBU 3.1 08/06/2020    CREATININE 2.2 08/06/2020    CALCIUM 8.3 08/06/2020    GFRAA 25 08/06/2020    GFRAA >60 01/05/2012    LABGLOM 21 08/06/2020    GLUCOSE 91 08/06/2020    GLUCOSE 92 07/09/2011         Conclusion/Time spent:         Recommendations see above    Time spent with patient and/or family: 1396-0950  Time reviewing records: 10  Time communicating with staff: 5     A total of  minutes spent with the patient and family on unit greater than 50% in counseling regarding palliative care and in goals of care for the patient. Thank you to  for this consultation. We will continue to follow Ms. Sorenson Amanda care as needed.      Electronically signed by Linnea Chappell MD on 8/6/2020 at 11:36 AM

## 2020-08-07 LAB
A/G RATIO: 1.2 (ref 1.1–2.2)
ALBUMIN SERPL-MCNC: 3.2 G/DL (ref 3.4–5)
ALP BLD-CCNC: 62 U/L (ref 40–129)
ALT SERPL-CCNC: 10 U/L (ref 10–40)
ANION GAP SERPL CALCULATED.3IONS-SCNC: 11 MMOL/L (ref 3–16)
APTT: 38.7 SEC (ref 24.2–36.2)
AST SERPL-CCNC: 26 U/L (ref 15–37)
BASOPHILS ABSOLUTE: 0.1 K/UL (ref 0–0.2)
BASOPHILS RELATIVE PERCENT: 1.3 %
BILIRUB SERPL-MCNC: 0.3 MG/DL (ref 0–1)
BUN BLDV-MCNC: 28 MG/DL (ref 7–20)
CALCIUM SERPL-MCNC: 8.4 MG/DL (ref 8.3–10.6)
CHLORIDE BLD-SCNC: 105 MMOL/L (ref 99–110)
CO2: 25 MMOL/L (ref 21–32)
CREAT SERPL-MCNC: 1.8 MG/DL (ref 0.6–1.2)
D DIMER: 315 NG/ML DDU (ref 0–229)
EOSINOPHILS ABSOLUTE: 0.2 K/UL (ref 0–0.6)
EOSINOPHILS RELATIVE PERCENT: 3.4 %
FIBRINOGEN: 315 MG/DL (ref 200–397)
GFR AFRICAN AMERICAN: 31
GFR NON-AFRICAN AMERICAN: 26
GLOBULIN: 2.7 G/DL
GLUCOSE BLD-MCNC: 116 MG/DL (ref 70–99)
GLUCOSE BLD-MCNC: 123 MG/DL (ref 70–99)
GLUCOSE BLD-MCNC: 131 MG/DL (ref 70–99)
GLUCOSE BLD-MCNC: 154 MG/DL (ref 70–99)
GLUCOSE BLD-MCNC: 218 MG/DL (ref 70–99)
HCT VFR BLD CALC: 28.2 % (ref 36–48)
HEMOGLOBIN: 9.5 G/DL (ref 12–16)
INR BLD: 1.02 (ref 0.86–1.14)
LYMPHOCYTES ABSOLUTE: 0.6 K/UL (ref 1–5.1)
LYMPHOCYTES RELATIVE PERCENT: 11.8 %
MCH RBC QN AUTO: 29 PG (ref 26–34)
MCHC RBC AUTO-ENTMCNC: 33.7 G/DL (ref 31–36)
MCV RBC AUTO: 85.9 FL (ref 80–100)
MONOCYTES ABSOLUTE: 0.5 K/UL (ref 0–1.3)
MONOCYTES RELATIVE PERCENT: 10.5 %
NEUTROPHILS ABSOLUTE: 3.7 K/UL (ref 1.7–7.7)
NEUTROPHILS RELATIVE PERCENT: 73 %
PDW BLD-RTO: 17.1 % (ref 12.4–15.4)
PERFORMED ON: ABNORMAL
PHOSPHORUS: 2.2 MG/DL (ref 2.5–4.9)
PLATELET # BLD: 150 K/UL (ref 135–450)
PMV BLD AUTO: 7.8 FL (ref 5–10.5)
POTASSIUM REFLEX MAGNESIUM: 4.5 MMOL/L (ref 3.5–5.1)
POTASSIUM SERPL-SCNC: 4.5 MMOL/L (ref 3.5–5.1)
PROTHROMBIN TIME: 11.8 SEC (ref 10–13.2)
RBC # BLD: 3.28 M/UL (ref 4–5.2)
SODIUM BLD-SCNC: 141 MMOL/L (ref 136–145)
TOTAL PROTEIN: 5.9 G/DL (ref 6.4–8.2)
WBC # BLD: 5.1 K/UL (ref 4–11)

## 2020-08-07 PROCEDURE — U0003 INFECTIOUS AGENT DETECTION BY NUCLEIC ACID (DNA OR RNA); SEVERE ACUTE RESPIRATORY SYNDROME CORONAVIRUS 2 (SARS-COV-2) (CORONAVIRUS DISEASE [COVID-19]), AMPLIFIED PROBE TECHNIQUE, MAKING USE OF HIGH THROUGHPUT TECHNOLOGIES AS DESCRIBED BY CMS-2020-01-R: HCPCS

## 2020-08-07 PROCEDURE — 85384 FIBRINOGEN ACTIVITY: CPT

## 2020-08-07 PROCEDURE — 92526 ORAL FUNCTION THERAPY: CPT

## 2020-08-07 PROCEDURE — 85730 THROMBOPLASTIN TIME PARTIAL: CPT

## 2020-08-07 PROCEDURE — 80053 COMPREHEN METABOLIC PANEL: CPT

## 2020-08-07 PROCEDURE — 97110 THERAPEUTIC EXERCISES: CPT

## 2020-08-07 PROCEDURE — 85379 FIBRIN DEGRADATION QUANT: CPT

## 2020-08-07 PROCEDURE — 84100 ASSAY OF PHOSPHORUS: CPT

## 2020-08-07 PROCEDURE — 97530 THERAPEUTIC ACTIVITIES: CPT

## 2020-08-07 PROCEDURE — 85610 PROTHROMBIN TIME: CPT

## 2020-08-07 PROCEDURE — 85025 COMPLETE CBC W/AUTO DIFF WBC: CPT

## 2020-08-07 PROCEDURE — 2060000000 HC ICU INTERMEDIATE R&B

## 2020-08-07 PROCEDURE — 2580000003 HC RX 258: Performed by: INTERNAL MEDICINE

## 2020-08-07 PROCEDURE — 6360000002 HC RX W HCPCS: Performed by: INTERNAL MEDICINE

## 2020-08-07 PROCEDURE — 6370000000 HC RX 637 (ALT 250 FOR IP): Performed by: INTERNAL MEDICINE

## 2020-08-07 RX ADMIN — HEPARIN SODIUM 5000 UNITS: 5000 INJECTION INTRAVENOUS; SUBCUTANEOUS at 06:30

## 2020-08-07 RX ADMIN — Medication 10 ML: at 22:23

## 2020-08-07 RX ADMIN — INSULIN LISPRO 2 UNITS: 100 INJECTION, SOLUTION INTRAVENOUS; SUBCUTANEOUS at 13:23

## 2020-08-07 RX ADMIN — HEPARIN SODIUM 5000 UNITS: 5000 INJECTION INTRAVENOUS; SUBCUTANEOUS at 14:52

## 2020-08-07 RX ADMIN — INSULIN LISPRO 1 UNITS: 100 INJECTION, SOLUTION INTRAVENOUS; SUBCUTANEOUS at 18:35

## 2020-08-07 RX ADMIN — CEFTRIAXONE 1 G: 1 INJECTION, POWDER, FOR SOLUTION INTRAMUSCULAR; INTRAVENOUS at 09:23

## 2020-08-07 RX ADMIN — HEPARIN SODIUM 5000 UNITS: 5000 INJECTION INTRAVENOUS; SUBCUTANEOUS at 22:23

## 2020-08-07 ASSESSMENT — PAIN SCALES - GENERAL
PAINLEVEL_OUTOF10: 0

## 2020-08-07 NOTE — PLAN OF CARE
Nutrition Problem #1: Inadequate oral intake  Intervention: Food and/or Nutrient Delivery: Continue Current Tube Feeding, Continue NPO  Nutritional Goals: Patient to tolerate EN to meet 100% of nutrition needs

## 2020-08-07 NOTE — PROGRESS NOTES
Speech Language Pathology  Facility/Department: [facility] 4 PCU  Dysphagia Daily Treatment Note    NAME: Sofie Hare  : 3/12/1922  MRN: [id number]    Patient Diagnosis(es):   Patient Active Problem List    Diagnosis Date Noted    COVID-19 2020    Acute metabolic encephalopathy     Closed bimalleolar fracture of right ankle     Bimalleolar ankle fracture, left, closed, initial encounter     Impacted cerumen of both ears 2016    Skin lesion 2016    Heart murmur 10/16/2015    Pain in both feet 2015    Shoulder pain, bilateral 2014    Edema 2012    Pernicious anemia 10/04/2011    Menopausal symptoms 10/04/2011    Onychomycosis 10/04/2011    Elevated blood pressure reading 10/04/2011    Leukopenia 10/04/2011    Esophageal reflux     Hypothyroidism     Carcinoma in situ of colon     Cataract     Allergic rhinitis      Allergies: Allergies   Allergen Reactions    Bee Venom            CXR (8/3/20)  No acute disease. Previous MBS  None - no h/o dysphagia per chart or family    Chart reviewed. Medical Diagnosis: acute metabolic encephalopathy  Treatment Diagnosis: Dysphagia    BSE Impression (20)  Patient presents with suspected moderate-severe pharyngeal stage dysphagia. Analyzed swallow function with trials of ice chips and thin liquid via tsp. Pt with prolonged mastication, impaired swallow initiation/limited laryngeal elevation, wet vocal quality. No overt s/s aspiration noted. Further assessment limited by pt's nausea. Recommend continue NPO, oral hygiene, small amounts ice chips / thin liquid via tsp. Consider short-term alternative means nutrition/hydration. Will continue to follow.     MBS results  Unable to complete d/t pt's COVID (+) status    Pain: none stated    Current Diet :  NPO + TF    Treatment:  Pt seen bedside to address the following goals:  Goal 1: Patient will tolerate repeat BSE.  : Pt in bed upon entry, reaching for SLP's arms and stating \"I can't wait, I can't wait. \" Pt with severe hearing and visual deficits, stating \"I can't hear and I can't see. \" Pt with confusion, c/o desire for PO and needing something to drink but then stating \"I can't take anything - I'm going to be sick. \" Pt then would state \"no more, no more\" but take drink spontaneously or open mouth for bolus. Analyzed pt with ice chips, thins via tsp / cup / straw, nectar thick liquids via tsp / cup / straw, and single small bite of regular solid. Attempted soft solid but pt declined. Pt declining solids beyond puree, stating inability to consume d/t xerostomia; however, pt then indicated inability to take liquids d/t nausea. Pt with intermittent delayed cough and wet vocal quality with all consistencies; no difference in performance noted between thin vs thickened liquids. Pt with prolonged mastication and oral manipulation of very small regular bolus with mod oral residue. Pt with talking at times immediately after taking bolus into oral cavity. Suspect pharyngeal dysphagia given AMS and cervical fx but unable to make complete assessment d/t pt's inability to participate in instrumental evaluation 2/2 COVID (+) status. Education completed with family as detailed below and options for NPO vs PO reviewed. Would recommend either NPO + [employer] Protocol + temporary alternate means of nutrition OR full liquid (NO STRAWS) with feed assist and close monitoring of respiratory status. Family to decide and notify staff of decision later this date. GOAL MET.  8/6: Addressed; cleared by RN to see pt. Received pt awake and alert, sitting up in bed, asking for water stating \"I can't wait, I need it now\". Provide thin water via tsp and small straw sip; immediate cough and wet vocal quality present. Pt with decreased cough for ice chips, puree, and nectar thick liquid; continued to demonstrate very wet vocal quality.  Attempted to cue pt to clear throat, but 2/2 hearing impairment and speaking through multiple masks, pt unable to comprehend. Wet vocal quality concerning for pharyngeal residue. Continue to suspect pharyngeal dysphagia, but remain unable to complete instrumental assessment 2/2 COVID status. Per chart review, palliative care has been consulted, and corpak was unable to be placed yesterday. Continue to recommend recommend either NPO + [employer] Protocol + temporary alternate means of nutrition OR full liquid (NO STRAWS) with feed assist and close monitoring of respiratory status. Will continue to monitor. Cont Goal.  8/7: Corpak able to be placed yesterday and pt currently with TF running. Pt sleeping upon entry but roused readily to tactile stimuli. C-collar removed for PO intake. Wet vocal quality appreciated prior to PO trials - pt unable to hear directions to cough / re-swallow in attempts to clear. Hearing aid appears to be nonfunctional at this time. Pt appeared calmer and with improved mental status as compared to when previously seen by this SLP 2 days prior. Analyzed pt with ice chips, tsp thin water, thins via straw, puree, and soft solid. Reactive coughing, wet gurgly vocal quality, and / or c/o \"choking\" exhibited with all consistencies. Spontaneous double swallows exhibited with puree consistency in addition to delayed cough, concerning for pharyngeal residue. Given severity of wet vocal quality prior to PO trials, suspect poor management and pharyngeal stasis of secretions. Pt continues to present with indicators of severe pharyngeal dysphagia and MBS would be warranted for full assessment of swallow. Pt will need to be out of COVID isolation prior to participating in procedure. Options continue to be NPO + ice chips / sips of thin water + TF if family elects aggressive tx measures OR  Full / thin liquids with feed assist if family desires PO for comfort (suspect this consistency would be easiest for pt to consume at this time). GOAL MET.     Goal 2: Patient/caregiver will demonstrate understanding of compensatory strategies/recommendations for improved swallowing safety/function. 8/5: Attempted to contact POA, son Allen Doty, via phone after session but went directly to voicemail. RN notified SLP that son requested his sister Rao Lafleur be contacted if he cannot be reached. Mari Barrera via phone and she deferred to sister-in-law Holley Mendez (Cash's spouse and former RN). SLP then contacted Holley Mendez via phone. D/W both Rao Lafleur and Holley Mendez role of SLP, BSE results, results of session this date, pt's AMS, current medical complications impacting swallow, concerns for dysphagia, concerns for aspiration, s/s and risks associated with aspiration, inability to complete instrumental swallow evaluation, NPO vs PO options, risks and potential benefits associated with NPO vs PO options, compensatory strategies to reduce risk of aspiration, importance of oral care, team's desire to attempt NG + TF if pt remains NPO, concerns for pt's ability to manage TF, inability to eliminate aspiration risk even with NPO status, and potential for swallow function to improve as infection treated / mental status clears. Pt previously on regular diet with no dysphagia concerns per both family members. Rao Lafleur indicated leaning towards NPO choice but unable to make that decision as not POA and deferred to Allen Oronajameyaleksandra / Thomesmer Vanessa to decide. Holley Mendez indicated desire to d/w siblings and Allen Doty (who she reported is not reachable via phone by SLP d/t his work schedule and conference calls). Holley Mendez reported she would d/w family and notify staff of decision later this date. Attempted to educated pt to role of SLP, purpose of visit, importance of PO intake, use of liquids to improve c/o xerostomia, and concerns for swallow. Pt unable to demonstrate comprehension d/t Huslia and AMS. Goal met with family.    8/6: Attempted to educate pt re: swallow function, dysphagia risks, recommendations/rationale; d/t confusion and Huslia, pt unable to comprehend. Per chart review, family was contacted yesterday, and palliative care has been consulted today. Corpak was unable to be placed yesterday. Continue to recommend recommend either NPO + [employer] Protocol + temporary alternate means of nutrition OR full liquid (NO STRAWS) with feed assist and close monitoring of respiratory status. Will continue to monitor. Cont Goal.  8/7: Attempted to educate pt to rationale for visit and dysphagia - pt unable to demonstrate comprehension given profound hearing deficits. Goal previously met with family. Patient/Family/Caregiver Education:  As above    Compensatory Strategies:  If family elects PO, recommend:  Upright for all PO intake + 30-45 minutes after  No straws  Assist feed  Small bites / sips  Alternate solids and liquids   Aggressive oral care 3x/day      Plan:  Continued daily Dysphagia treatment with goals per  plan of care. Diet recommendations:  Given pt's advanced age, overt s/s of significant pharyngeal dysfunction at bedside, and inability to complete full assessment of swallow via instrumental evaluation 2/2 COVID isolation status, family continues to have the following options:    -NPO + [employer] Protocol (ice chips / THIN water only after oral care) + temporary alternate means of nutrition + medications via alternate means if aggressive tx desired    or    -Full liquid / thin liquid diet + assist feed + meds crushed in puree if family would like pt to have PO for comfort    Palliative care note indicates possible exploration of hospice after this admission. If family elects hospice / comfort care, recommend allowing PO diet of family's choice for pt's comfort and QOL. Will monitor ability and appropriateness to complete MBS pending pt's isolation status / POC with family  DC recommendation: TBD  Treatment: 15 minutes  D/W nursing, Chela William. PerfectServed results / recs to Dr. Bruce Pizarro. D/W Dr. Lacie Mckeon of palliative care.   Needs met prior to leaving room, call button in reach. Naveen Gupta MA CCC-SLP; [phone]  Speech-Language Pathologist    Pg.  # [phone]     If patient is discharged prior to next treatment, this note will serve as the discharge summary

## 2020-08-07 NOTE — PROGRESS NOTES
Pt continues to have difficulty with swallowing. Oral care provided every 2 hours, mouth swabbed, ice chips provided and lip balm applied each time.

## 2020-08-07 NOTE — CARE COORDINATION
Received call from [address] [place] [address] at [place] [address]. She will review patient for placement, although we are awaiting additional Covid testing prior to starting Precert.     Lul Benitez MSN, RN  Director Case Management  [facility]   [phone]

## 2020-08-07 NOTE — CARE COORDINATION
Spoke with Gloria at [facility]. She is able to accept the patient if she is Covid Positive. Will await results to start precert.     Jose Anne MSN, RN  Director Case Management  [facility]   [phone]

## 2020-08-07 NOTE — PLAN OF CARE
Problem: Body Temperature -  Risk of, Imbalanced  Goal: Ability to maintain a body temperature within defined limits  Outcome: Ongoing  Note: Pt remains afebrile this shift. No s/s of N/V, chills, diaphoresis, pain. Problem: Skin Integrity:  Goal: Absence of new skin breakdown  Description: Absence of new skin breakdown  Outcome: Ongoing  Note: No new skin breakdown noted this shift. Pt continues to be turned and repositioned every 2 hours and more often as needed. Problem: Falls - Risk of:  Goal: Will remain free from falls  Description: Will remain free from falls  Outcome: Ongoing  Note: Pt remains free from falls this shift. Bed low and locked, bed alarm on, non-skid footwear in place, call light within reach, Avasys in room. Pt frequently oriented to room and call light. Needs met.

## 2020-08-07 NOTE — PROGRESS NOTES
Palliative Care Chart Review  and Check in Note:     NAME:  Stephany Date: 8/3/2020  Hospital Day:  Hospital Day: 5   Current Code status: Limited    Pt not seen d/t COVID(+) status    Palliative care is continuing to following Ms. Debby Hodges for symptom management,  and goals of care discussion as needed. Patient's chart reviewed today 8/7/20. Called Anjana Jeffery, and gave updates regarding course of treatment. Pt current has NG (Dobhoff) feeding tube in lieu of her 'severe pharygeal dysphagia' and 'coughing on everything' per SLP, who suggested that pt would need an MBS to prove that, but overall appears to have worsening swallow. Pt would not be able to get MBS w/o coming out of COVID isolation first.    COVID(+) on 08/03, COVID(-) on 08/04, per charge nurse pt would need 2 negative test results in order to be taken out of isolation. Additional COVID test orderedto confirm negative status to allow for more choices for facilities as well as to allow for more appropriate swallow eval.     At this point in time, family would like to see how she progresses before considering hospice care and would still be willing to consider PEG tube. The following are the currently established goals/code status, and Symptom management. Goals of care:  1. Goals of Care/Advanced Care planning/Code status: DNR/DNI (Limitedx4)  2. Pain: Medicated as needed for c/o pain per the advanced dementia pain scale. 3. SOB: Satting well on room air  4. AMS: likely 2/2 acute metabolic encephalopathy in setting of UTI on JUANCARLOS  5. Dysphagia: 2/2 AMS and possibly some contribution of C1 fracture, unlikely to improve in the short term  6.  Disposition: TBD pending hospital course and ongoing family discussions regarding goals of care    Code status: Limitedx4 (DNR/DNI)    Discharge plan: TBD pending hospital course and ongoing family discussions regarding goals of care as well as COVID status    Electronically signed by Sean Farah Nacho Finnegan MD on 8/7/2020 at 1:53 PM

## 2020-08-07 NOTE — PROGRESS NOTES
Hospitalist Progress Note      PCP: Kyler Berrios MD    Date of Admission: 8/3/2020    Chief Complaint: Fall, [facility]: Patient is 49-year-old female resident of assisted living history of hypothyroid, hypertension presented to [facility] ED after she had a fall. On my evaluation patient is confused so most of the history was obtained from the charts and talking to family over the phone Mika Hernadez and Sarah Gayle. Per ED notes  Patient was last seen at 10:00 by the assisted living nurse. She was sitting in a chair when they came back to see her at 12 midnight they found her on the floor. Complain of left hip and knee pain.     On arrival to ED hemodynamically stable. Blood work significant for creatinine 5.3, CO2 11, potassium 6.4 troponin 0 0.22. EKG normal sinus rhythm with some PACs.     Imaging including CT head, cervical spine small right frontal scalp contusion. Comminuted displaced left occipital condyle and C1 lateral mass fracture. Prominence of the CSF space along the right convexity may related to patient positioning as opposed to subdural hygroma. Age-related cerebral volume loss. X-ray left knee no fracture or malalignment. X-ray hip no evidence of acute finding in the pelvis. Subjective: I want to eat I am hungry. Hard of hearing. Denies any pain, SOB, abdominal pain.  Corpak was not placed successful     Medications:  Reviewed    Infusion Medications    dextrose       Scheduled Medications    heparin (porcine)  5,000 Units Subcutaneous 3 times per day    levothyroxine  125 mcg Oral Daily    sodium chloride flush  10 mL Intravenous 2 times per day    cefTRIAXone (ROCEPHIN) IV  1 g Intravenous Q24H    insulin lispro  0-6 Units Subcutaneous TID WC    insulin lispro  0-3 Units Subcutaneous Nightly     PRN Meds: acetaminophen **OR** acetaminophen, sodium chloride flush, polyethylene glycol, promethazine **OR** ondansetron, [Held by provider] magnesium sulfate, glucose, 08/05/20  0447   TROPONINI 0.19* 0.15* 0.15*       Urinalysis:      Lab Results   Component Value Date    NITRU Negative 08/03/2020    WBCUA 335 08/03/2020    BACTERIA Rare 01/07/2020    RBCUA 77 08/03/2020    BLOODU LARGE 08/03/2020    SPECGRAV 1.013 08/03/2020    GLUCOSEU Negative 08/03/2020    GLUCOSEU NEGATIVE 05/17/2010       Radiology:  XR ABDOMEN (KUB) (SINGLE AP VIEW)   Final Result      NG tube advanced from the GE junction with tip overlying the expected position of the distal stomach. XR ABDOMEN (KUB) (SINGLE AP VIEW)   Final Result   Impression: Nasogastric tube is coiled within the distal esophagus. MRA NECK WO CONTRAST   Final Result   1. No evidence of acute infarct or other acute intracranial abnormality. 2. Moderate global volume loss with moderate chronic small vessel ischemic disease within the periventricular white matter. MRA NECK:      FINDINGS: The vertebral arteries are patent bilaterally and equal in size. No evidence of vertebral artery occlusion. Both vertebral arteries contribute to the basilar artery. The common carotid arteries are patent bilaterally. The internal and external carotid arteries are patent bilaterally. There is no high-grade stenosis. There is no evidence of vessel occlusion. IMPRESSION:   1. Patent carotid and vertebral arteries. No evidence of acute dissection or occlusion. MRI BRAIN WO CONTRAST   Final Result   1. No evidence of acute infarct or other acute intracranial abnormality. 2. Moderate global volume loss with moderate chronic small vessel ischemic disease within the periventricular white matter. MRA NECK:      FINDINGS: The vertebral arteries are patent bilaterally and equal in size. No evidence of vertebral artery occlusion. Both vertebral arteries contribute to the basilar artery. The common carotid arteries are patent bilaterally. The internal and external carotid arteries are patent bilaterally.  There is no high-grade stenosis. There is no evidence of vessel occlusion. IMPRESSION:   1. Patent carotid and vertebral arteries. No evidence of acute dissection or occlusion. CT ABDOMEN PELVIS WO CONTRAST Additional Contrast? None   Final Result      1. Mild bilateral hydronephrosis with ureters dilated to the level of the urinary bladder of uncertain etiology. 2. Collapsed urinary bladder with Cha catheter in place. There is suggestion of diffuse bladder wall thickening. Further evaluation with cystoscopy may be indicated. 3. Moderate age-indeterminate compression fracture of the L1 vertebral body. US RENAL COMPLETE   Final Result      Mild left hydronephrosis and mild right hydroureter. No ultrasound evidence of a renal calculus. If the patient is symptomatic for renal colic, need for further evaluation with CT abdomen can be determined clinically. Assessment/Plan:    Active Hospital Problems    Diagnosis Date Noted    COVID-19 [U07.1] 08/04/2020    Acute metabolic encephalopathy [A48.40] 08/03/2020     #COVID-19 pneumonia  Will check inflammatory markers CRP, ferritin, d-dimer  Patient is noted respiratory distress. Will hold off any dexamethasone. ID signed off    #Acute metabolic encephalopathy: Slightly improved  Multifactorial UTI, renal failure    #JUANCARLOS multifactorial prerenal and postrenal  PVR was reported to be 900 cc  Cont IVF as patient is NPO due to failed swallow eval. Will stop after she gets dubhoff. Renal ultrasound mild left hydronephrosis and mild right hydroureter. #UTI with Klebsiella  Continue IV Rocephin day . #Hematuria of unclear etiology. Consult from urology reviewed recommended CBI may need cysto      #Elevated troponin in setting of JUANCARLOS. Troponin trending down. Echocardiogram pending    #Fall unsure mechanical versus other reason. Follow-up on echocardiogram.  MRA and MRI reviewed no aneurysm or stroke.     #Traumatic comminuted and

## 2020-08-07 NOTE — PROGRESS NOTES
Hospitalist Progress Note      PCP: Jamey Irene MD    Date of Admission: 8/3/2020    Chief [place] [facility] [place] Patient is 70-year-old female resident of assisted living history of hypothyroid, hypertension presented to [facility] ED after she had a fall. On my evaluation patient is confused so most of the history was obtained from the charts and talking to family over the phone Socorro Goldman and RACH. Per ED notes  Patient was last seen at 10:00 by the assisted living nurse. She was sitting in a chair when they came back to see her at 12 midnight they found her on the floor. Complain of left hip and knee pain.     On arrival to ED hemodynamically stable. Blood work significant for creatinine 5.3, CO2 11, potassium 6.4 troponin 0 0.22. EKG normal sinus rhythm with some PACs.     Imaging including CT head, cervical spine small right frontal scalp contusion. Comminuted displaced left occipital condyle and C1 lateral mass fracture. Prominence of the CSF space along the right convexity may related to patient positioning as opposed to subdural hygroma. Age-related cerebral volume loss. X-ray left knee no fracture or malalignment. X-ray hip no evidence of acute finding in the pelvis. Subjective: I need water, I am hungry help me. Hard of hearing denies pain. Tolerating tube feed. No acute event overnight.     Medications:  Reviewed    Infusion Medications    dextrose       Scheduled Medications    heparin (porcine)  5,000 Units Subcutaneous 3 times per day    levothyroxine  125 mcg Oral Daily    sodium chloride flush  10 mL Intravenous 2 times per day    cefTRIAXone (ROCEPHIN) IV  1 g Intravenous Q24H    insulin lispro  0-6 Units Subcutaneous TID WC    insulin lispro  0-3 Units Subcutaneous Nightly     PRN Meds: acetaminophen **OR** acetaminophen, sodium chloride flush, polyethylene glycol, promethazine **OR** ondansetron, [Held by provider] magnesium sulfate, glucose, dextrose, glucagon (rDNA), dextrose, perflutren lipid microspheres      Intake/Output Summary (Last 24 hours) at 8/7/2020 1344  Last data filed at 8/7/2020 1130  Gross per 24 hour   Intake 464.17 ml   Output 1025 ml   Net -560.83 ml       Physical Exam Performed:    BP (!) 146/90   Pulse 108   Temp 98.7 °F (37.1 °C) (Axillary)   Resp 18   Ht 5' 2\" (1.575 m)   Wt 157 lb 6.5 oz (71.4 kg)   SpO2 93%   BMI 28.79 kg/m²     General appearance: Alert, Dobbhoff for feeding  HEENT: Pupils equal, round, and reactive to light. Conjunctivae/corneas clear. Neck: [place] J collar present  Respiratory:  Normal respiratory effort. Clear to auscultation, bilaterally without Rales/Wheezes/Rhonchi. Cardiovascular: Regular rate and rhythm with normal S1/S2 without murmurs, rubs or gallops. Abdomen: Soft, non-tender, non-distended with normal bowel sounds. Musculoskeletal: No clubbing, cyanosis or edema bilaterally. Full range of motion without deformity. Skin: Skin color, texture, turgor normal.  No rashes or lesions. Neurologic:  Neurovascularly intact without any focal sensory/motor deficits.  Cranial nerves: II-XII intact, grossly non-focal.  Psychiatric: Alert and oriented to self  Capillary Refill: Brisk,< 3 seconds   Peripheral Pulses: +2 palpable, equal bilaterally       Labs:   Recent Labs     08/05/20 0456 08/06/20  0329 08/07/20  0505   WBC 5.1 5.3 5.1   HGB 10.4* 10.2* 9.5*   HCT 30.8* 30.3* 28.2*   * 140 150     Recent Labs     08/05/20  0456 08/06/20  0329 08/07/20  0505    138 141   K 4.9  4.9 4.6 4.5  4.5    104 105   CO2 21 25 25   BUN 48* 38* 28*   CREATININE 2.8* 2.2* 1.8*   CALCIUM 8.5 8.3 8.4   PHOS 3.2 2.8 2.2*     Recent Labs     08/05/20  0456 08/06/20  0329 08/07/20  0505   AST 28 24 26   ALT 10 10 10   BILITOT 0.3 0.3 0.3   ALKPHOS 62 65 62     Recent Labs     08/05/20  0456 08/06/20  0329 08/07/20  0505   INR 1.01 0.96 1.02     Recent Labs     08/04/20  1817 08/05/20  0251 08/05/20  0447 There is no evidence of vessel occlusion. IMPRESSION:   1. Patent carotid and vertebral arteries. No evidence of acute dissection or occlusion. CT ABDOMEN PELVIS WO CONTRAST Additional Contrast? None   Final Result      1. Mild bilateral hydronephrosis with ureters dilated to the level of the urinary bladder of uncertain etiology. 2. Collapsed urinary bladder with Cha catheter in place. There is suggestion of diffuse bladder wall thickening. Further evaluation with cystoscopy may be indicated. 3. Moderate age-indeterminate compression fracture of the L1 vertebral body. US RENAL COMPLETE   Final Result      Mild left hydronephrosis and mild right hydroureter. No ultrasound evidence of a renal calculus. If the patient is symptomatic for renal colic, need for further evaluation with CT abdomen can be determined clinically. Assessment/Plan:    Active Hospital Problems    Diagnosis Date Noted    COVID-19 [U07.1] 08/04/2020    Acute metabolic encephalopathy [V49.66] 08/03/2020     #Dysphagia status post Dobbhoff placement suspected secondary to poor incoherence due to dementia. Continue tube feed with Dobbhoff. Will do modified barium swallow evaluation if fails patient will be needing alternative measures for nutrition I.e PEG tube placement  Palliative team talk to family they are agreeable for PEG tube if needed    #COVID-19 pneumonia  Repeat COVID-19 negative. Will reorder COVID-19 for modified barium swallow eval.  ID was consulted but they signed off. #Acute metabolic encephalopathy: Slightly improved  Multifactorial UTI, renal failure    #JUANCARLOS multifactorial prerenal and postrenal improving  Renal ultrasound mild left hydronephrosis and mild right hydroureter. Nephro on board    #UTI with Klebsiella  5 days of IV antibiotics with Rocephin completed    #Hematuria of unclear etiology. Hematuria is clearing up from CBI.   Urology recommending conservative management at this point. #Elevated troponin in setting of JUANCARLOS. Troponin trending down. Echocardiogram pending    #Fall unsure mechanical versus other reason. Follow-up on echocardiogram.  MRA and MRI reviewed no aneurysm or stroke. #Traumatic comminuted and displaced left occipital condyle and C1 left lateral mass fracture  Neurosurgery consult reviewed and appreciated  [place] J collar to be worn at all times  Cervical collar does NOT need to be worn when eating, bathing or showering  Cervical collar pads to be cleaned with soap & water, air dried, and changed daily  Continue IV morphine for pain control. Will transition to oral once swallow eval completed. PT/OT/ST evaluation in progress    DVT Prophylaxis: Subcu heparin  Diet: DIET TUBE FEED CONTINUOUS/CYCLIC NPO; STANDARD WITH FIBER (Jevity 1.2); Nasogastric; Continuous; 20; 55; 23  Dietary Nutrition Supplements: Protein Modular  Code Status: Limited    PT/OT Eval Status: Consulted    Dispo -modified barium swallow evaluation if fails patient will need PEG tube placement. Likely to stay over the weekend.     This chart was likely completed using voice recognition technology and may contain unintended grammatical , phraseology,and/or punctuation errors      Leland Taylor MD

## 2020-08-07 NOTE — PROGRESS NOTES
Fluid Accumulation: No significant    Fluid Accumulation Location: No significant     Strength: Not Performed; Not Measured     NUTRITION DIAGNOSIS   Problem: Problem #1: Inadequate oral intake  Etiology: Insufficient energy/nutrient consumption  Signs & Symptoms: NPO status due to medical condition and Nutrition [place] [address] and/or Nutrient Delivery:Continue NPO or Start Tube Feeding   Nutrition education/counseling/coordination of care: Continue Inpatient Monitoring  or Speech Therapy    NUTRITION MONITORING & EVALUATION:  Evaluation:Progressing towards goal   Goals:Goals: Patient to tolerate EN to meet 100% of nutrition needs  Monitoring: I/O, Pertinent Labs , TF Intake  or TF Tolerance      OBJECTIVE DATA:  · Nutrition-Focused Physical Findings: +confusion.    · Wounds: Multiple, skin tear, blister     Past Medical History:   Diagnosis Date    Carcinoma in situ of colon 1998    Diarrhea     Esophageal reflux     Other extrapyramidal disease and abnormal movement disorder     Pernicious anemia     S/P colonoscopy 4/09    OK - no further eval.    Unspecified cataract     Unspecified hypothyroidism     Unspecified pruritic disorder         ANTHROPOMETRICS  Current Height: 5' 2\" (157.5 cm)  Current Weight: 157 lb 6.5 oz (71.4 kg)    Admission weight: 155 lb (70.3 kg)  Ideal Bodyweight: 50kg  Usual Bodyweight: UTO  Adjusted Bodyweight: n/a  Weight Changes: MACK      BMI BMI (Calculated): 28.9    Wt Readings from Last 50 Encounters:   08/05/20 157 lb 6.5 oz (71.4 kg)   08/03/20 155 lb (70.3 kg)   01/28/19 169 lb 5 oz (76.8 kg)       COMPARATIVE STANDARDS  Estimated Total Kcals/Day : 22-25  Current Bodyweight (71.4 kg) 1571 - 1785 kcal  - overweight  Estimated Total Protein (g/day) : 1.3-1.5 Current Bodyweight (71.4 kg) 93 - 107g/day  Estimated Daily Total Fluid (ml/day): 1550 - 1800 mL per day     Food / Nutrition-Related History  Pre-Admission / Home Diet: Pre-Admission/Home Diet: Unable to Obtain   Home Supplements / Herbals:   none noted  Food Restrictions / Cultural Requests:   none noted    Diet Orders / Intake / Nutrition Support  Current diet/supplement order: DIET TUBE FEED CONTINUOUS/CYCLIC NPO; STANDARD WITH FIBER (Jevity 1.2); Nasogastric; Continuous; 20; 55; 23  Dietary Nutrition Supplements: Protein Modular     NSG Recorded PO:   PO Fluids P.O.: 0 mL  PO Meals     PO Intake: NPO  PO Supplement: NPO   PO Supplement Intake: NPO  IVF: none    Current EN Orders: EN at goal rate: Jevity 1.2 @ 55 mLx 23 hours (adjusting for synthroid drug-nutrient interaction- hold TF 30 minutes before and after once daily dose) + 1 bottle [id number] daily provides 1265 mL TV, 1622 kcal, 96 grams protein, 1021 mL free water. Maintenance water flush of 30 ml every 4 hours. Additives/Modulars/IVF:    None    NUTRITION RISK LEVEL: Risk Level:  Moderate     Sekou Childers RD, LD  [place]:  [phone]  Office:  [phone]

## 2020-08-07 NOTE — PROGRESS NOTES
This nurse spoke to pt's son to provide updates on pt status, answered questions to the best of this nurse's ability. Pt's son Peyton Decent understanding and agrees to plan.

## 2020-08-07 NOTE — PROGRESS NOTES
Urology Attending Progress Note      Subjective: confused. Resting quietly     Vitals:  BP (!) 147/90   Pulse 114   Temp 98.6 °F (37 °C) (Axillary)   Resp 20   Ht 5' 2\" (1.575 m)   Wt 157 lb 6.5 oz (71.4 kg)   SpO2 94%   BMI 28.79 kg/m²   Temp  Av.6 °F (36.4 °C)  Min: 96.8 °F (36 °C)  Max: 98.6 °F (37 °C)    Intake/Output Summary (Last 24 hours) at 2020 1030  Last data filed at 2020 0448  Gross per 24 hour   Intake 670.84 ml   Output -200 ml   Net 870.84 ml       Exam: newman draining clear urine     Labs:  WBC:    Lab Results   Component Value Date    WBC 5.1 2020     Hemoglobin/Hematocrit:    Lab Results   Component Value Date    HGB 9.5 2020    HCT 28.2 2020     BMP:    Lab Results   Component Value Date     2020    K 4.5 2020     2020    CO2 25 2020    BUN 28 2020    LABALBU 3.2 2020    CREATININE 1.8 2020    CALCIUM 8.4 2020    GFRAA 31 2020    GFRAA >60 2012    LABGLOM 26 2020     PT/INR:    Lab Results   Component Value Date    PROTIME 11.8 2020    INR 1.02 2020     PTT:    Lab Results   Component Value Date    APTT 38.7 2020   [APTT        Urine Culture:  Klebsiella        Antibiotic Therapy:  Rocephin     Imaging: CT 8/3/20:      1. Mild bilateral hydronephrosis with ureters dilated to the level of the urinary bladder of uncertain etiology. 2. Collapsed urinary bladder with Newman catheter in place. There is suggestion of diffuse bladder wall thickening. Further evaluation with cystoscopy may be indicated. 3. Moderate age-indeterminate compression fracture of the L1 vertebral body         Impression/Plan:  81 yo F with gross hematuria, JUANCARLOS and mild bilateral hydro      -newman draining clear urine on minimal CBI.  Titrate CBI to keep urine clear.   -Cr continues to improve @ 1.8 from 2.2   -H/H stable @ 9.5/28.2  -continue abx  -will continue conservative medical management for now     Krystyna Samayoa, APRN - CNP

## 2020-08-07 NOTE — PROGRESS NOTES
Pt resting quietly in bed, eyes closed. Bed low and locked, bed alarm on, non-skid footwear in place, call light within reach. CBI running and draining pink fluid, no clots or sediment noted. NG in place, continuous tube feed running Jevity 1.2 @ 55 mL/hr. Pt denies pain, repositioned for comfort, oral care provided, mouth swabbed, suction and lip balm applied.

## 2020-08-07 NOTE — PROGRESS NOTES
Nephrology Note                                                                                                                                                                                                                                                                                                                                                               Office : [phone]     Fax :[phone]      Patient's Name: Roberth Boles  8:35 AM  8/7/2020    Reason for Consult:  Elevated Cr  Requesting Physician:  Mikhail Gupta MD      Chief Complaint:  Fall    History of Present Ilness:    Roberth Boles is a 80 y.o. female with PMH significant for carcinoma in situ of colon, pernicious anemia, and hypothyroidism. She presented to [facility] ED because she fell and was found on the floor. She is complaining of left hip and knee pain, as well as pain at the back of her head but she denies loss of consciousness. Denies chest pain, abdominal pain, and numbness or tingling. Denies being on blood thinner. According to her son, she ambulates a little bit with a walker, but mainly uses a wheelchair. He reports that she has been a little more confused in the last week. 8/7: Tube feeding initiated 8/6. Per nurse, pt doing ok, more alert than previously. CBI draining great, lots of UP that is clearing up. She is more agitated, and is not clearing her own secretions well. Tolerating NG tube fine. 8/6: Per nurse, she is ok this morning, making plenty of urine which is draining well but cherry red in color. Currently on LR 50ml/h. Pt still NPO, and [place] attempted 2X but unsuccessful, palliative care consulted but has not seen her yet. 8/5: Pt not seen today due to recent COVID diagnosis to limit exposure. Per nurse, pt was unable to tolerate fluids by mouth yesterday and this continues today. Speech rec spoon fulls of water and ice chips for now.  Pt had overt s/s of pharyngeal dysphagia at bedside g, Daily PRN  promethazine (PHENERGAN) tablet 12.5 mg, Q6H PRN    Or  ondansetron (ZOFRAN) injection 4 mg, Q6H PRN  [Held by provider] magnesium sulfate 2 g in 50 mL IVPB premix, PRN  cefTRIAXone (ROCEPHIN) 1 g IVPB in 50 mL D5W minibag, Q24H  insulin lispro (1 Unit Dial) 0-6 Units, TID WC  insulin lispro (1 Unit Dial) 0-3 Units, Nightly  glucose (GLUTOSE) 40 % oral gel 15 g, PRN  dextrose 50 % IV solution, PRN  glucagon (rDNA) injection 1 mg, PRN  dextrose 5 % solution, PRN  perflutren lipid microspheres (DEFINITY) injection 1.65 mg, ONCE PRN        Review of Systems:   14 point ROS obtained but were negative except mentioned in HPI      Physical exam:     Vitals:  BP (!) 147/90   Pulse 114   Temp 98.6 °F (37 °C) (Axillary)   Resp 20   Ht 5' 2\" (1.575 m)   Wt 157 lb 6.5 oz (71.4 kg)   SpO2 94%   BMI 28.79 kg/m²   Due to limiting exposure to COVID-19, patient was not interviewed or examined, at this time. I have personally reviewed the reports and images of labs, radiological studies, current and old medical records. Data:   Labs:  CBC:   Recent Labs     08/05/20 0456 08/06/20 0329 08/07/20  0505   WBC 5.1 5.3 5.1   HGB 10.4* 10.2* 9.5*   * 140 150     BMP:    Recent Labs     08/05/20 0456 08/06/20 0329 08/07/20  0505    138 141   K 4.9  4.9 4.6 4.5    104 105   CO2 21 25 25   BUN 48* 38* 28*   CREATININE 2.8* 2.2* 1.8*   GLUCOSE 126* 91 116*     Ca/Mg/Phos:   Recent Labs     08/05/20 0456 08/06/20 0329 08/07/20  0505   CALCIUM 8.5 8.3 8.4   PHOS 3.2 2.8 2.2*     Hepatic:   Recent Labs     08/05/20 0456 08/06/20 0329 08/07/20  0505   AST 28 24 26   ALT 10 10 10   BILITOT 0.3 0.3 0.3   ALKPHOS 62 65 62     Troponin:   Recent Labs     08/04/20  1817 08/05/20  0251 08/05/20  0447   TROPONINI 0.19* 0.15* 0.15*     BNP: No results for input(s): BNP in the last 72 hours. Lipids: No results for input(s): CHOL, TRIG, HDL, LDLCALC, LABVLDL in the last 72 hours.   ABGs: No results for input(s): PHART, PO2ART, BOX3QGM in the last 72 hours. INR:   Recent Labs     08/05/20  0456 08/06/20  0329 08/07/20  0505   INR 1.01 0.96 1.02     UA:  No results for input(s): Cloyde Kind, GLUCOSEU, BILIRUBINUR, KETUA, SPECGRAV, BLOODU, PHUR, PROTEINU, UROBILINOGEN, NITRU, LEUKOCYTESUR, LABMICR, URINETYPE in the last 72 hours. Urine Microscopic:   No results for input(s): LABCAST, BACTERIA, COMU, HYALCAST, WBCUA, RBCUA, EPIU in the last 72 hours. Urine Culture:   No results for input(s): LABURIN in the last 72 hours. Urine Chemistry:   No results for input(s): Joelle Dalton, PROTEINUR, NAUR in the last 72 hours. IMAGING:  XR ABDOMEN (KUB) (SINGLE AP VIEW)   Final Result      NG tube advanced from the GE junction with tip overlying the expected position of the distal stomach. XR ABDOMEN (KUB) (SINGLE AP VIEW)   Final Result   Impression: Nasogastric tube is coiled within the distal esophagus. MRA NECK WO CONTRAST   Final Result   1. No evidence of acute infarct or other acute intracranial abnormality. 2. Moderate global volume loss with moderate chronic small vessel ischemic disease within the periventricular white matter. MRA NECK:      FINDINGS: The vertebral arteries are patent bilaterally and equal in size. No evidence of vertebral artery occlusion. Both vertebral arteries contribute to the basilar artery. The common carotid arteries are patent bilaterally. The internal and external carotid arteries are patent bilaterally. There is no high-grade stenosis. There is no evidence of vessel occlusion. IMPRESSION:   1. Patent carotid and vertebral arteries. No evidence of acute dissection or occlusion. MRI BRAIN WO CONTRAST   Final Result   1. No evidence of acute infarct or other acute intracranial abnormality. 2. Moderate global volume loss with moderate chronic small vessel ischemic disease within the periventricular white matter.       MRA NECK:      FINDINGS: The vertebral arteries are patent bilaterally and equal in size. No evidence of vertebral artery occlusion. Both vertebral arteries contribute to the basilar artery. The common carotid arteries are patent bilaterally. The internal and external carotid arteries are patent bilaterally. There is no high-grade stenosis. There is no evidence of vessel occlusion. IMPRESSION:   1. Patent carotid and vertebral arteries. No evidence of acute dissection or occlusion. CT ABDOMEN PELVIS WO CONTRAST Additional Contrast? None   Final Result      1. Mild bilateral hydronephrosis with ureters dilated to the level of the urinary bladder of uncertain etiology. 2. Collapsed urinary bladder with Cha catheter in place. There is suggestion of diffuse bladder wall thickening. Further evaluation with cystoscopy may be indicated. 3. Moderate age-indeterminate compression fracture of the L1 vertebral body. US RENAL COMPLETE   Final Result      Mild left hydronephrosis and mild right hydroureter. No ultrasound evidence of a renal calculus. If the patient is symptomatic for renal colic, need for further evaluation with CT abdomen can be determined clinically. Assessment/Plan   1. JUANCARLOS - secondary to obstruction/urinary retention/hypovolemia   -  Cr continuing to improve 2.2 --> 1.8,  on 8/6  - NS stopped, pt has NG tube  - Uprotein 435, Margarita 87, FeNa 4.0%, postrenal/obstructive JUANCARLOS  - uric acid elevated at 9.5, CK normal, rhabdo ruled out  - renal U/S revealed mild L hydronephrosis and mild R hydroureter  - follow up CT abd/pelvis wo contrast revealed mild bilateral hydronephrosis with dilated ureters as well as diffuse bladder wall thickening  - bladder scan > 930 after voiding 250 on 8/3, urinary retention    2. Anion gap metabolic acidosis  - resolved    3. Hypophosphatemia  - currently 2.2, should be fine with NG feedings  - monitor, if low tomorrow will replete    4.  Acute metabolic

## 2020-08-07 NOTE — PROGRESS NOTES
J at all times (except when eating, bathing or showering)  Subjective   General  Chart Reviewed: Yes  Additional Pertinent Hx: 79 yo admitted 8/3/20 for fall/C spine Fx. Head CT positive-positive L occipital condyle fx/C1 lateral mass fx, brain MRI neg; NS consult-no surgery and pt to wear [place] J. Pmhx: colon CA, pernicious anemia. Subjective  Subjective: Pt found supine in bed. Does not make eye contact with therapists. Pt very [place]. Cooperative with mobility. Pain Screening  Patient Currently in Pain: (no indication of pain)       Orientation  Orientation  Overall Orientation Status: Impaired(difficult to fully assess 2* [place] but pt does not make eye contact during session)  Cognition      Objective   Bed mobility  Rolling to Left: Maximum assistance  Supine to Sit: Maximum assistance  Sit to Supine: Maximum assistance  Scooting: Maximal assistance(pt making good attempt to scoot towards HOB while sitting at EOB but little movement noted)           Balance  Sitting - Static: Fair  Sitting - Dynamic: Fair  Comments: Pt sat EOB x5 mins with CGA to min A and [place] J collar in place.   Exercises  Comments: x 12 reps total B APs; x 5-6 reps B LAQ, heel slides with min A and physical assist to initiate                        G-Code     OutComes Score                                                     AM-PAC Score  AM-PAC Inpatient Mobility Raw Score : 6 (08/07/20 1613)  AM-PAC Inpatient T-Scale Score : 23.55 (08/07/20 1613)  Mobility Inpatient CMS 0-100% Score: 100 (08/07/20 1613)  Mobility Inpatient CMS G-Code Modifier : CN (08/07/20 1613)          Goals  Short term goals  Time Frame for Short term goals: discharge  Short term goal 1: sit to/from supine supervision  ongoing  Short term goal 2: sit to/from stand supervision  ongoing  Short term goal 3: independent B LE HEP x10 reps  ongoing  Patient Goals   Patient goals : did not state    Plan    Plan  Times per week: 2-5  Current Treatment Recommendations:

## 2020-08-07 NOTE — PROGRESS NOTES
Occupational Therapy  Facility/Department: [place] [address] [facility]  Daily Treatment Note  NAME: Jameson Tafoya  : 3/12/1922  MRN: [id number]    Date of Service: 2020    Discharge Recommendations:Candi De León scored a  on the AM-PAC ADL Inpatient form. Current research shows that an AM-PAC score of 17 or less is typically not associated with a discharge to the patient's home setting. Based on the patient's AM-PAC score and their current ADL deficits, it is recommended that the patient have 3-5 sessions per week of Occupational Therapy at d/c to increase the patient's independence. Please see assessment section for further patient specific details. If patient discharges prior to next session this note will serve as a discharge summary. Please see below for the latest assessment towards goals. Assessment   Performance deficits / Impairments: Decreased endurance;Decreased functional mobility ; Decreased ADL status; Decreased posture;Decreased strength;Decreased cognition  Assessment: Pt's functional independence is significantly below baseline. She was initially resistive to transferring supine > sit, but in time became more comfortable w/ OT * PT in cotx. . Pt is significantly [place]- communication (and education) are difficult. . She has [place] J collar in place, w/ NG tube for nutrition. Currently has poor tolerance for therapy. Will continue to follow. Treatment Diagnosis: Impaired ADLs, mobility, activity tolerance  Patient Education: Role of OT - no learning noted  Activity Tolerance  Activity Tolerance: barrier this date primarily pt relying on hand over hand, and modeling - no response to verbal dialogue/directions  Safety Devices  Safety Devices in place: Yes  Type of devices: Left in bed;Bed alarm in place;Call light within reach;Nurse notified         Patient Diagnosis(es): There were no encounter diagnoses.       has a past medical history of Carcinoma in situ of colon, Diarrhea, Esophageal reflux, Other extrapyramidal disease and abnormal movement disorder, Pernicious anemia, S/P colonoscopy, Unspecified cataract, Unspecified hypothyroidism, and Unspecified pruritic disorder. has a past surgical history that includes colectomy; Colonoscopy (4/09); and Hysterectomy. Restrictions  Position Activity Restriction  Other position/activity restrictions: up as tolerated, COVID positive-airborne precautions, [place] J at all times (except when eating, bathing or showering)  Subjective   General  Chart Reviewed: Yes, Progress Notes, Orders  Additional Pertinent Hx: 80 y.o. F adm 8/3 after a fall. Imaging revealed traumatic comminuted and displaced left occipital condyle and C1 left lateral mass fractures with extension to the foramen transversarium. Neurosurgery consulted, rec conservative management. Family / Caregiver Present: No  Diagnosis: Acute metabolic encephalopathy  Subjective  Subjective: Pt foundsemi- supine, agreeable to OT/PT cotx. Pt is profoundly hard of hearing  General Comment  Comments: Pt w/ minimal response to speech in session (??OT/PT in N95+face mask+shield, and question if pt was aware that we were attempting to speak with her??) - though did follow tactile and movement cues.       Orientation  Orientation  Overall Orientation Status: Impaired  Orientation Level: Oriented to person  Objective             Balance  Sitting Balance: Minimal assistance  Bed mobility  Rolling to Left: Maximum assistance  Supine to Sit: Maximum assistance  Sit to Supine: Maximum assistance  Scooting: Maximal assistance(pt making good attempt to scoot towards HOB while sitting at EOB but little movement noted)                          Cognition  Cognition Comment: Difficult to formally assess due to pt's profound difficulty hearing                    Type of ROM/Therapeutic Exercise  Type of ROM/Therapeutic Exercise: AAROM  Comment: seated at EOB and semi-supine 7-10rep BUE shldr fwd flx, overhead reach, elbow flx/ext Plan   Plan  Times per week: 2-5x  Times per day: Daily    AM-PAC Score        AM-PAC Inpatient Daily Activity Raw Score: 9 (08/07/20 1647)  AM-PAC Inpatient ADL T-Scale Score : 25.33 (08/07/20 1647)  ADL Inpatient CMS 0-100% Score: 79.59 (08/07/20 1647)  ADL Inpatient CMS G-Code Modifier : CL (08/07/20 1647)    Goals  Short term goals  Time Frame for Short term goals: By d/c  Short term goal 1: Supine <> sit with Min A x2 - not met  Short term goal 2: Sitting at EOB to complete ADL task with setup - not met  Short term goal 3: Sit-stand transfer with Min A x2 - not met  Patient Goals   Patient goals : Not stated       Therapy Time   Individual Concurrent Group Co-treatment   Time In 1537         Time Out 1600         Minutes 23         Timed Code Treatment Minutes: [address] [facility], [facility]-OTR/L [id number]

## 2020-08-07 NOTE — CARE COORDINATION
HATTIE received call from Palliative Care. Patient's initial test was cCOVID-19 positive but retest was COVID-19 negative. HATTIE has asked Dr. Abdi Otoole to have Dr. Junior Raines order a second test to confirm the negative COVID-19 status. We do not want to send a negative patient to a COVID facility unless it is necessary. This would also provide more options for SNF discharge. CM team to follow.      KAYLEEN Santiago, LSW     [facility]   [phone]

## 2020-08-08 LAB
A/G RATIO: 1.2 (ref 1.1–2.2)
ALBUMIN SERPL-MCNC: 3.1 G/DL (ref 3.4–5)
ALP BLD-CCNC: 68 U/L (ref 40–129)
ALT SERPL-CCNC: 10 U/L (ref 10–40)
ANION GAP SERPL CALCULATED.3IONS-SCNC: 7 MMOL/L (ref 3–16)
AST SERPL-CCNC: 23 U/L (ref 15–37)
BASOPHILS ABSOLUTE: 0.1 K/UL (ref 0–0.2)
BASOPHILS RELATIVE PERCENT: 1.2 %
BILIRUB SERPL-MCNC: <0.2 MG/DL (ref 0–1)
BUN BLDV-MCNC: 30 MG/DL (ref 7–20)
CALCIUM SERPL-MCNC: 8.3 MG/DL (ref 8.3–10.6)
CHLORIDE BLD-SCNC: 109 MMOL/L (ref 99–110)
CO2: 25 MMOL/L (ref 21–32)
CREAT SERPL-MCNC: 1.7 MG/DL (ref 0.6–1.2)
EOSINOPHILS ABSOLUTE: 0.2 K/UL (ref 0–0.6)
EOSINOPHILS RELATIVE PERCENT: 4.2 %
GFR AFRICAN AMERICAN: 34
GFR NON-AFRICAN AMERICAN: 28
GLOBULIN: 2.6 G/DL
GLUCOSE BLD-MCNC: 109 MG/DL (ref 70–99)
GLUCOSE BLD-MCNC: 128 MG/DL (ref 70–99)
GLUCOSE BLD-MCNC: 131 MG/DL (ref 70–99)
GLUCOSE BLD-MCNC: 147 MG/DL (ref 70–99)
GLUCOSE BLD-MCNC: 147 MG/DL (ref 70–99)
HCT VFR BLD CALC: 26 % (ref 36–48)
HEMOGLOBIN: 8.9 G/DL (ref 12–16)
LYMPHOCYTES ABSOLUTE: 0.6 K/UL (ref 1–5.1)
LYMPHOCYTES RELATIVE PERCENT: 12.6 %
MCH RBC QN AUTO: 29 PG (ref 26–34)
MCHC RBC AUTO-ENTMCNC: 34.1 G/DL (ref 31–36)
MCV RBC AUTO: 85 FL (ref 80–100)
MONOCYTES ABSOLUTE: 0.6 K/UL (ref 0–1.3)
MONOCYTES RELATIVE PERCENT: 11.9 %
NEUTROPHILS ABSOLUTE: 3.3 K/UL (ref 1.7–7.7)
NEUTROPHILS RELATIVE PERCENT: 70.1 %
PDW BLD-RTO: 17.7 % (ref 12.4–15.4)
PERFORMED ON: ABNORMAL
PHOSPHORUS: 1.3 MG/DL (ref 2.5–4.9)
PLATELET # BLD: 135 K/UL (ref 135–450)
PMV BLD AUTO: 7.7 FL (ref 5–10.5)
POTASSIUM REFLEX MAGNESIUM: 4.7 MMOL/L (ref 3.5–5.1)
RBC # BLD: 3.06 M/UL (ref 4–5.2)
SODIUM BLD-SCNC: 141 MMOL/L (ref 136–145)
TOTAL PROTEIN: 5.7 G/DL (ref 6.4–8.2)
WBC # BLD: 4.8 K/UL (ref 4–11)

## 2020-08-08 PROCEDURE — 85025 COMPLETE CBC W/AUTO DIFF WBC: CPT

## 2020-08-08 PROCEDURE — 51700 IRRIGATION OF BLADDER: CPT

## 2020-08-08 PROCEDURE — 80053 COMPREHEN METABOLIC PANEL: CPT

## 2020-08-08 PROCEDURE — 6370000000 HC RX 637 (ALT 250 FOR IP): Performed by: INTERNAL MEDICINE

## 2020-08-08 PROCEDURE — 2060000000 HC ICU INTERMEDIATE R&B

## 2020-08-08 PROCEDURE — 2580000003 HC RX 258: Performed by: INTERNAL MEDICINE

## 2020-08-08 PROCEDURE — 84100 ASSAY OF PHOSPHORUS: CPT

## 2020-08-08 PROCEDURE — 2580000003 HC RX 258: Performed by: HOSPITALIST

## 2020-08-08 PROCEDURE — 6360000002 HC RX W HCPCS: Performed by: INTERNAL MEDICINE

## 2020-08-08 PROCEDURE — 2500000003 HC RX 250 WO HCPCS: Performed by: HOSPITALIST

## 2020-08-08 RX ADMIN — CEFTRIAXONE 1 G: 1 INJECTION, POWDER, FOR SOLUTION INTRAMUSCULAR; INTRAVENOUS at 11:03

## 2020-08-08 RX ADMIN — ACETAMINOPHEN 650 MG: 325 TABLET ORAL at 11:03

## 2020-08-08 RX ADMIN — INSULIN LISPRO 1 UNITS: 100 INJECTION, SOLUTION INTRAVENOUS; SUBCUTANEOUS at 13:31

## 2020-08-08 RX ADMIN — Medication 10 ML: at 10:14

## 2020-08-08 RX ADMIN — HEPARIN SODIUM 5000 UNITS: 5000 INJECTION INTRAVENOUS; SUBCUTANEOUS at 06:06

## 2020-08-08 RX ADMIN — HEPARIN SODIUM 5000 UNITS: 5000 INJECTION INTRAVENOUS; SUBCUTANEOUS at 13:32

## 2020-08-08 RX ADMIN — HEPARIN SODIUM 5000 UNITS: 5000 INJECTION INTRAVENOUS; SUBCUTANEOUS at 21:48

## 2020-08-08 RX ADMIN — POTASSIUM PHOSPHATE, MONOBASIC POTASSIUM PHOSPHATE, DIBASIC 20 MMOL: 224; 236 INJECTION, SOLUTION, CONCENTRATE INTRAVENOUS at 14:19

## 2020-08-08 ASSESSMENT — PAIN SCALES - PAIN ASSESSMENT IN ADVANCED DEMENTIA (PAINAD)
BODYLANGUAGE: 1
BREATHING: 0
NEGVOCALIZATION: 1
CONSOLABILITY: 0
FACIALEXPRESSION: 1
TOTALSCORE: 4
BODYLANGUAGE: 0
FACIALEXPRESSION: 1
CONSOLABILITY: 1
TOTALSCORE: 2
BREATHING: 0
NEGVOCALIZATION: 1

## 2020-08-08 ASSESSMENT — PAIN SCALES - GENERAL: PAINLEVEL_OUTOF10: 5

## 2020-08-08 NOTE — PROGRESS NOTES
Nephrology Note                                                                                                                                                                                                                                                                                                                                                               Office : [phone]     Fax :[phone]      Patient's Name: Baljeet Robison  9:01 AM  8/8/2020    Reason for Consult:  Elevated Cr    History of Present Ilness:    Baljeet Robison is a 80 y.o. female with PMH significant for carcinoma in situ of colon, pernicious anemia, and hypothyroidism. She presented to [facility] ED because she fell and was found on the floor. She is complaining of left hip and knee pain, as well as pain at the back of her head but she denies loss of consciousness. Denies chest pain, abdominal pain, and numbness or tingling. Denies being on blood thinner. According to her son, she ambulates a little bit with a walker, but mainly uses a wheelchair. He reports that she has been a little more confused in the last week. INTERVAL HISTORY    Feels same  Shortness of breath: No   UOP: Fair  Creat: trending down  On TF        Past Medical History:   Diagnosis Date    Carcinoma in situ of colon 1998    Diarrhea     Esophageal reflux     Other extrapyramidal disease and abnormal movement disorder     Pernicious anemia     S/P colonoscopy 4/09    OK - no further eval.    Unspecified cataract     Unspecified hypothyroidism     Unspecified pruritic disorder        Past Surgical History:   Procedure Laterality Date    COLECTOMY      partial    COLONOSCOPY  4/09    OK - no further eval    HYSTERECTOMY         No family history on file. reports that she has never smoked. She has never used smokeless tobacco. She reports that she does not drink alcohol or use drugs.     Allergies:  Bee venom    Current Medications: acetaminophen (TYLENOL) tablet 650 mg, Q6H PRN    Or  acetaminophen (TYLENOL) suppository 650 mg, Q6H PRN  heparin (porcine) injection 5,000 Units, 3 times per day  levothyroxine (SYNTHROID) tablet 125 mcg, Daily  sodium chloride flush 0.9 % injection 10 mL, 2 times per day  sodium chloride flush 0.9 % injection 10 mL, PRN  polyethylene glycol (GLYCOLAX) packet 17 g, Daily PRN  promethazine (PHENERGAN) tablet 12.5 mg, Q6H PRN    Or  ondansetron (ZOFRAN) injection 4 mg, Q6H PRN  [Held by provider] magnesium sulfate 2 g in 50 mL IVPB premix, PRN  cefTRIAXone (ROCEPHIN) 1 g IVPB in 50 mL D5W minibag, Q24H  insulin lispro (1 Unit Dial) 0-6 Units, TID WC  insulin lispro (1 Unit Dial) 0-3 Units, Nightly  glucose (GLUTOSE) 40 % oral gel 15 g, PRN  dextrose 50 % IV solution, PRN  glucagon (rDNA) injection 1 mg, PRN  dextrose 5 % solution, PRN  perflutren lipid microspheres (DEFINITY) injection 1.65 mg, ONCE PRN        Physical exam:     Vitals:  /82   Pulse 104   Temp 99.8 °F (37.7 °C) (Axillary)   Resp 18   Ht 5' 2\" (1.575 m)   Wt 157 lb 6.5 oz (71.4 kg)   SpO2 95%   BMI 28.79 kg/m²   Constitutional:  resting, NAD  HEENT: DHT in place  Cardiovascular:  S1, S2 reg  Ext: no lower extremity edema  CNS:  no agitation   : Cha in place    Limited exam to preserve PPE    Data:   Labs:  CBC:   Recent Labs     08/06/20  0329 08/07/20  0505 08/08/20  0605   WBC 5.3 5.1 4.8   HGB 10.2* 9.5* 8.9*    150 135     BMP:    Recent Labs     08/06/20  0329 08/07/20  0505 08/08/20  0605    141 141   K 4.6 4.5  4.5 4.7    105 109   CO2 25 25 25   BUN 38* 28* 30*   CREATININE 2.2* 1.8* 1.7*   GLUCOSE 91 116* 147*     Ca/Mg/Phos:   Recent Labs     08/06/20  0329 08/07/20  0505 08/08/20  0605   CALCIUM 8.3 8.4 8.3   PHOS 2.8 2.2* 1.3*     Hepatic:   Recent Labs     08/06/20  0329 08/07/20  0505 08/08/20  0605   AST 24 26 23   ALT 10 10 10   BILITOT 0.3 0.3 <0.2   ALKPHOS 65 62 68     Troponin:   No results for input(s): TROPONINI in the last 72 hours. BNP: No results for input(s): BNP in the last 72 hours. Lipids: No results for input(s): CHOL, TRIG, HDL, LDLCALC, LABVLDL in the last 72 hours. ABGs: No results for input(s): PHART, PO2ART, SYM8YKY in the last 72 hours. INR:   Recent Labs     08/06/20  0329 08/07/20  0505   INR 0.96 1.02     UA:  No results for input(s): Leita Halls, GLUCOSEU, BILIRUBINUR, KETUA, SPECGRAV, BLOODU, PHUR, PROTEINU, UROBILINOGEN, NITRU, LEUKOCYTESUR, Gearldean Snellen in the last 72 hours. Urine Microscopic:   No results for input(s): LABCAST, BACTERIA, COMU, HYALCAST, WBCUA, RBCUA, EPIU in the last 72 hours. Urine Culture:   No results for input(s): LABURIN in the last 72 hours. Urine Chemistry:   No results for input(s): Eulis Press, PROTEINUR, NAUR in the last 72 hours. IMAGING:  XR ABDOMEN (KUB) (SINGLE AP VIEW)   Final Result      NG tube advanced from the GE junction with tip overlying the expected position of the distal stomach. XR ABDOMEN (KUB) (SINGLE AP VIEW)   Final Result   Impression: Nasogastric tube is coiled within the distal esophagus. MRA NECK WO CONTRAST   Final Result   1. No evidence of acute infarct or other acute intracranial abnormality. 2. Moderate global volume loss with moderate chronic small vessel ischemic disease within the periventricular white matter. MRA NECK:      FINDINGS: The vertebral arteries are patent bilaterally and equal in size. No evidence of vertebral artery occlusion. Both vertebral arteries contribute to the basilar artery. The common carotid arteries are patent bilaterally. The internal and external carotid arteries are patent bilaterally. There is no high-grade stenosis. There is no evidence of vessel occlusion. IMPRESSION:   1. Patent carotid and vertebral arteries. No evidence of acute dissection or occlusion. MRI BRAIN WO CONTRAST   Final Result   1.  No evidence of acute infarct or

## 2020-08-08 NOTE — PLAN OF CARE
Problem: Airway Clearance - Ineffective  Goal: Achieve or maintain patent airway  Outcome: Ongoing     Problem: Gas Exchange - Impaired  Goal: Absence of hypoxia  Outcome: Ongoing     Problem: Gas Exchange - Impaired  Goal: Promote optimal lung function  Outcome: Ongoing     Problem: Breathing Pattern - Ineffective  Goal: Ability to achieve and maintain a regular respiratory rate  Outcome: Ongoing     Problem: Body Temperature -  Risk of, Imbalanced  Goal: Ability to maintain a body temperature within defined limits  Outcome: Ongoing     Problem: Body Temperature -  Risk of, Imbalanced  Goal: Will regain or maintain usual level of consciousness  Outcome: Ongoing     Problem:  Body Temperature -  Risk of, Imbalanced  Goal: Complications related to the disease process, condition or treatment will be avoided or minimized  Outcome: Ongoing     Problem: Isolation Precautions - Risk of Spread of Infection  Goal: Prevent transmission of infection  Outcome: Ongoing     Problem: Nutrition Deficits  Goal: Optimize nutrtional status  Outcome: Ongoing     Problem: Risk for Fluid Volume Deficit  Goal: Maintain normal heart rhythm  Outcome: Ongoing     Problem: Risk for Fluid Volume Deficit  Goal: Maintain absence of muscle cramping  Outcome: Ongoing     Problem: Risk for Fluid Volume Deficit  Goal: Maintain normal serum potassium, sodium, calcium, phosphorus, and pH  Outcome: Ongoing     Problem: Loneliness or Risk for Loneliness  Goal: Demonstrate positive use of time alone when socialization is not possible  Outcome: Ongoing

## 2020-08-08 NOTE — PROGRESS NOTES
Zoom conference setup for patient and family. Patient's son Blima Curling) updated.  All questions answered

## 2020-08-08 NOTE — PROGRESS NOTES
Spoke with patient's POC Yves Cullen). Updated on patient's condition, treatment plan and pending labs. All questions answered.

## 2020-08-08 NOTE — PROGRESS NOTES
Urology Progress Note   Juan Daniel Friend   :  3/12/1922   MRN:  [id number]     Length of Stay:  5      Assessment:  81yo F COVID + admit with UTI, urinary retention and bilateral hydro with JUANCARLOS. Has had persistent gross hematuria since cath insertion      Plan:  -JUANCARLOS resolved with indwelling cath. Likely due to retention.  -On Rocephin for UTI  -Gross hematuria improved but persistent on CBI. Primary Hospitalist off duty when I tried to call for care coordination this evening. Will coordinate with primary team in AM.  ? Cysto with possible fulguration.   Called son, Kelly Stoll, but no answer         Medications:     Scheduled Meds:   potassium phosphate IVPB  20 mmol Intravenous Once    heparin (porcine)  5,000 Units Subcutaneous 3 times per day    levothyroxine  125 mcg Oral Daily    sodium chloride flush  10 mL Intravenous 2 times per day    insulin lispro  0-6 Units Subcutaneous TID WC    insulin lispro  0-3 Units Subcutaneous Nightly        Continuous Infusions:   dextrose          PRN Meds:acetaminophen **OR** acetaminophen, sodium chloride flush, polyethylene glycol, promethazine **OR** ondansetron, [Held by provider] magnesium sulfate, glucose, dextrose, glucagon (rDNA), dextrose, perflutren lipid microspheres        24-hour Events:  No acute    Subjective: No complaints     ROS:  Unable to obtain reliable due to patient mental status      Objective:     Vital signs in last 24 hours:  BP (!) 141/91   Pulse 122   Temp 96.6 °F (35.9 °C) (Axillary)   Resp 19   Ht 5' 2\" (1.575 m)   Wt 157 lb 6.5 oz (71.4 kg)   SpO2 94%   BMI 28.79 kg/m²   Temp  Av.5 °F (36.9 °C)  Min: 96.6 °F (35.9 °C)  Max: 99.8 °F (37.7 °C)    Intake/Output Summary (Last 24 hours) at 2020 1807  Last data filed at 2020 1556  Gross per 24 hour   Intake 2350 ml   Output 6325 ml   Net -3975 ml       EXAM:    Gen: No acute distress; resting comfortably in hospital bed  Neuro: alert; oriented x3; normal speech  Head: NCAT  Eyes: sclera non icteric; conjunctiva non-injected  Neck: supple; full AROM  CV: no peripheral edema  Pulm: relaxed resp; symmetric chest rise  y      Labs:  WBC:    Lab Results   Component Value Date    WBC 4.8 08/08/2020     Hemoglobin/Hematocrit:    Lab Results   Component Value Date    HGB 8.9 08/08/2020    HCT 26.0 08/08/2020     BMP:    Lab Results   Component Value Date     08/08/2020    K 4.7 08/08/2020     08/08/2020    CO2 25 08/08/2020    BUN 30 08/08/2020    LABALBU 3.1 08/08/2020    CREATININE 1.7 08/08/2020    CALCIUM 8.3 08/08/2020    GFRAA 34 08/08/2020    GFRAA >60 01/05/2012    LABGLOM 28 08/08/2020     PT/INR:    Lab Results   Component Value Date    PROTIME 11.8 08/07/2020    INR 1.02 08/07/2020     PTT:    Lab Results   Component Value Date    APTT 38.7 08/07/2020   [APTT    CULTURES  URINE CULTURE  Results for orders placed or performed during the hospital encounter of 08/03/20   Culture, Urine    Specimen: Urine, clean catch   Result Value Ref Range    Urine Culture, Routine (A)      <10,000 CFU/ml mixed skin/urogenital cammie. No further workup    Organism Klebsiella pneumoniae (A)     Urine Culture, Routine 50,000 CFU/ml        Susceptibility    Klebsiella pneumoniae - BACTERIAL SUSCEPTIBILITY PANEL BY SABAS     ampicillin >=32 Resistant mcg/mL     ceFAZolin* <=4 Sensitive mcg/mL      * NOTE: Cefazolin should only be used for uncomplicated UTI      for E.coli or Klebsiella pneumoniae. cefepime <=0.12 Sensitive mcg/mL     cefTRIAXone <=0.25 Sensitive mcg/mL     ciprofloxacin <=0.25 Sensitive mcg/mL     ertapenem <=0.12 Sensitive mcg/mL     gentamicin <=1 Sensitive mcg/mL     levofloxacin <=0.12 Sensitive mcg/mL     nitrofurantoin 64 Intermediate mcg/mL     piperacillin-tazobactam <=4 Sensitive mcg/mL     trimethoprim-sulfamethoxazole <=20 Sensitive mcg/mL       BLOOD CULTURE  No results found for this or any previous visit.          Tania Martinez  8/8/20206:07 PM

## 2020-08-08 NOTE — PROGRESS NOTES
Received call from [facility]) Updated on pending COVID result. All questions answered.

## 2020-08-08 NOTE — ACP (ADVANCE CARE PLANNING)
Advanced Care Planning Note. Purpose of Encounter: Advanced care planning in light of acute and chronic deteriorating medical conditions. Parties In Attendance: ,  Son Khoa Figueroa), Dr. Per Cintron (myself)    Decisional Capacity: No currently confused and very hard of hearing    Subjective: family understand in this voluntary conversation that Michele Dubin continues to deteriorate and discuss what inteventions and plans patient would want implemented in light of the following diagnoses:  rachel    Objective: Pt has been having chronic decline in functional status with increased dependence on others for ADLs  Increased frequency of Hospitalizations. Likelihood of further hospitalizations with likelihood of escalation of medical interventions with the expectation of returning to a diminishing baseline level of functionality. Discussion highlights: I discussed with son  the ramifications of their acute and chronic medical problems- and the likely outcomes expected, both in terms of statistics, and as part of my experience seeing patients with similar conditions. We did discuss the meanings of the different possible code statuses- and we inquired which of these seem to line up with the patients current and previously expressed values. Goals of Care Determination: Son wishes to continue with DNR CC arrest but would want to go ahead with the PEG tube if she continues to fail a swallow eval    Plan: Continue to educate patient on medical conditions and the choices available regarding possible outcomes with regard to goals of care and code status.        Time spent on Advanced care Plannin minutes    Per Cintron MD  2020 1:13 PM
8/6/2020 at 11:40 AM

## 2020-08-09 LAB
A/G RATIO: 1 (ref 1.1–2.2)
ALBUMIN SERPL-MCNC: 3.1 G/DL (ref 3.4–5)
ALP BLD-CCNC: 74 U/L (ref 40–129)
ALT SERPL-CCNC: 12 U/L (ref 10–40)
ANION GAP SERPL CALCULATED.3IONS-SCNC: 11 MMOL/L (ref 3–16)
AST SERPL-CCNC: 24 U/L (ref 15–37)
BASOPHILS ABSOLUTE: 0.1 K/UL (ref 0–0.2)
BASOPHILS RELATIVE PERCENT: 1 %
BILIRUB SERPL-MCNC: 0.3 MG/DL (ref 0–1)
BUN BLDV-MCNC: 37 MG/DL (ref 7–20)
CALCIUM SERPL-MCNC: 8.2 MG/DL (ref 8.3–10.6)
CHLORIDE BLD-SCNC: 104 MMOL/L (ref 99–110)
CO2: 22 MMOL/L (ref 21–32)
CREAT SERPL-MCNC: 1.6 MG/DL (ref 0.6–1.2)
EOSINOPHILS ABSOLUTE: 0.3 K/UL (ref 0–0.6)
EOSINOPHILS RELATIVE PERCENT: 5.3 %
GFR AFRICAN AMERICAN: 36
GFR NON-AFRICAN AMERICAN: 30
GLOBULIN: 3 G/DL
GLUCOSE BLD-MCNC: 111 MG/DL (ref 70–99)
GLUCOSE BLD-MCNC: 113 MG/DL (ref 70–99)
GLUCOSE BLD-MCNC: 93 MG/DL (ref 70–99)
HCT VFR BLD CALC: 30.2 % (ref 36–48)
HEMOGLOBIN: 10.1 G/DL (ref 12–16)
LYMPHOCYTES ABSOLUTE: 0.6 K/UL (ref 1–5.1)
LYMPHOCYTES RELATIVE PERCENT: 9.8 %
MAGNESIUM: 1.7 MG/DL (ref 1.8–2.4)
MCH RBC QN AUTO: 28.4 PG (ref 26–34)
MCHC RBC AUTO-ENTMCNC: 33.4 G/DL (ref 31–36)
MCV RBC AUTO: 85.1 FL (ref 80–100)
MONOCYTES ABSOLUTE: 0.6 K/UL (ref 0–1.3)
MONOCYTES RELATIVE PERCENT: 10 %
NEUTROPHILS ABSOLUTE: 4.7 K/UL (ref 1.7–7.7)
NEUTROPHILS RELATIVE PERCENT: 73.9 %
PDW BLD-RTO: 18.1 % (ref 12.4–15.4)
PERFORMED ON: ABNORMAL
PERFORMED ON: NORMAL
PHOSPHORUS: 2.8 MG/DL (ref 2.5–4.9)
PLATELET # BLD: 156 K/UL (ref 135–450)
PMV BLD AUTO: 7.6 FL (ref 5–10.5)
POTASSIUM REFLEX MAGNESIUM: 4.8 MMOL/L (ref 3.5–5.1)
POTASSIUM SERPL-SCNC: 4.8 MMOL/L (ref 3.5–5.1)
RBC # BLD: 3.55 M/UL (ref 4–5.2)
SODIUM BLD-SCNC: 137 MMOL/L (ref 136–145)
TOTAL PROTEIN: 6.1 G/DL (ref 6.4–8.2)
WBC # BLD: 6.4 K/UL (ref 4–11)

## 2020-08-09 PROCEDURE — 2060000000 HC ICU INTERMEDIATE R&B

## 2020-08-09 PROCEDURE — 6360000002 HC RX W HCPCS: Performed by: INTERNAL MEDICINE

## 2020-08-09 PROCEDURE — 36415 COLL VENOUS BLD VENIPUNCTURE: CPT

## 2020-08-09 PROCEDURE — 85025 COMPLETE CBC W/AUTO DIFF WBC: CPT

## 2020-08-09 PROCEDURE — 83735 ASSAY OF MAGNESIUM: CPT

## 2020-08-09 PROCEDURE — 6370000000 HC RX 637 (ALT 250 FOR IP): Performed by: INTERNAL MEDICINE

## 2020-08-09 PROCEDURE — 80053 COMPREHEN METABOLIC PANEL: CPT

## 2020-08-09 PROCEDURE — 2580000003 HC RX 258: Performed by: INTERNAL MEDICINE

## 2020-08-09 PROCEDURE — 84100 ASSAY OF PHOSPHORUS: CPT

## 2020-08-09 RX ORDER — MAGNESIUM SULFATE IN WATER 40 MG/ML
2 INJECTION, SOLUTION INTRAVENOUS ONCE
Status: COMPLETED | OUTPATIENT
Start: 2020-08-09 | End: 2020-08-09

## 2020-08-09 RX ADMIN — Medication 10 ML: at 20:00

## 2020-08-09 RX ADMIN — HEPARIN SODIUM 5000 UNITS: 5000 INJECTION INTRAVENOUS; SUBCUTANEOUS at 06:26

## 2020-08-09 RX ADMIN — LEVOTHYROXINE SODIUM 125 MCG: 125 TABLET ORAL at 10:20

## 2020-08-09 RX ADMIN — MAGNESIUM SULFATE HEPTAHYDRATE 2 G: 40 INJECTION, SOLUTION INTRAVENOUS at 15:30

## 2020-08-09 RX ADMIN — Medication 10 ML: at 10:19

## 2020-08-09 NOTE — PROGRESS NOTES
Nephrology Note                                                                                                                                                                                                                                                                                                                                                               Office : [phone]     Fax :[phone]      Patient's Name: Danna Tiwari  10:13 AM  8/9/2020    Reason for Consult:  Elevated Cr    History of Present Ilness:    Danna Tiwari is a 80 y.o. female with PMH significant for carcinoma in situ of colon, pernicious anemia, and hypothyroidism. She presented to [facility] ED because she fell and was found on the floor. She is complaining of left hip and knee pain, as well as pain at the back of her head but she denies loss of consciousness. Denies chest pain, abdominal pain, and numbness or tingling. Denies being on blood thinner. According to her son, she ambulates a little bit with a walker, but mainly uses a wheelchair. He reports that she has been a little more confused in the last week. INTERVAL HISTORY    Feels same  Shortness of breath: No   UOP: Fair  Creat: trending down  TF on hold for possible cysto       Past Medical History:   Diagnosis Date    Carcinoma in situ of colon 1998    Diarrhea     Esophageal reflux     Other extrapyramidal disease and abnormal movement disorder     Pernicious anemia     S/P colonoscopy 4/09    OK - no further eval.    Unspecified cataract     Unspecified hypothyroidism     Unspecified pruritic disorder        Past Surgical History:   Procedure Laterality Date    COLECTOMY      partial    COLONOSCOPY  4/09    OK - no further eval    HYSTERECTOMY         No family history on file. reports that she has never smoked. She has never used smokeless tobacco. She reports that she does not drink alcohol or use drugs.     Allergies:  Bee venom    Current Medications:    acetaminophen (TYLENOL) tablet 650 mg, Q6H PRN    Or  acetaminophen (TYLENOL) suppository 650 mg, Q6H PRN  heparin (porcine) injection 5,000 Units, 3 times per day  levothyroxine (SYNTHROID) tablet 125 mcg, Daily  sodium chloride flush 0.9 % injection 10 mL, 2 times per day  sodium chloride flush 0.9 % injection 10 mL, PRN  polyethylene glycol (GLYCOLAX) packet 17 g, Daily PRN  promethazine (PHENERGAN) tablet 12.5 mg, Q6H PRN    Or  ondansetron (ZOFRAN) injection 4 mg, Q6H PRN  [Held by provider] magnesium sulfate 2 g in 50 mL IVPB premix, PRN  insulin lispro (1 Unit Dial) 0-6 Units, TID WC  insulin lispro (1 Unit Dial) 0-3 Units, Nightly  glucose (GLUTOSE) 40 % oral gel 15 g, PRN  dextrose 50 % IV solution, PRN  glucagon (rDNA) injection 1 mg, PRN  dextrose 5 % solution, PRN  perflutren lipid microspheres (DEFINITY) injection 1.65 mg, ONCE PRN        Physical exam:     Vitals:  BP (!) 151/96   Pulse 102   Temp 97.6 °F (36.4 °C) (Axillary)   Resp 17   Ht 5' 2\" (1.575 m)   Wt 157 lb 6.5 oz (71.4 kg)   SpO2 95%   BMI 28.79 kg/m²   Constitutional:  resting, NAD  HEENT: DHT in place  Cardiovascular:  S1, S2 reg  Ext: no lower extremity edema  CNS:  no agitation   : Cha in place    Limited exam to preserve PPE    Data:   Labs:  CBC:   Recent Labs     08/07/20  0505 08/08/20  0605 08/09/20  0320   WBC 5.1 4.8 6.4   HGB 9.5* 8.9* 10.1*    135 156     BMP:    Recent Labs     08/07/20  0505 08/08/20  0605 08/09/20  0320    141 137   K 4.5  4.5 4.7 4.8  4.8    109 104   CO2 25 25 22   BUN 28* 30* 37*   CREATININE 1.8* 1.7* 1.6*   GLUCOSE 116* 147* 113*     Ca/Mg/Phos:   Recent Labs     08/07/20  0505 08/08/20  0605 08/09/20  0320   CALCIUM 8.4 8.3 8.2*   MG  --   --  1.70*   PHOS 2.2* 1.3*  --      Hepatic:   Recent Labs     08/07/20  0505 08/08/20  0605 08/09/20  0320   AST 26 23 24   ALT 10 10 12   BILITOT 0.3 <0.2 0.3   ALKPHOS 62 68 74     Troponin:   No results for input(s): TROPONINI in the last 72 hours. BNP: No results for input(s): BNP in the last 72 hours. Lipids: No results for input(s): CHOL, TRIG, HDL, LDLCALC, LABVLDL in the last 72 hours. ABGs: No results for input(s): PHART, PO2ART, ENV0EAN in the last 72 hours. INR:   Recent Labs     08/07/20  0505   INR 1.02     UA:  No results for input(s): COLORU, CLARITYU, GLUCOSEU, BILIRUBINUR, KETUA, SPECGRAV, BLOODU, PHUR, PROTEINU, UROBILINOGEN, NITRU, LEUKOCYTESUR, [place] in the last 72 hours. Urine Microscopic:   No results for input(s): LABCAST, BACTERIA, COMU, HYALCAST, WBCUA, RBCUA, EPIU in the last 72 hours. Urine Culture:   No results for input(s): LABURIN in the last 72 hours. Urine Chemistry:   No results for input(s): [place], PROTEINUR, NAUR in the last 72 hours. IMAGING:  XR ABDOMEN (KUB) (SINGLE AP VIEW)   Final Result      NG tube advanced from the GE junction with tip overlying the expected position of the distal stomach. XR ABDOMEN (KUB) (SINGLE AP VIEW)   Final Result   Impression: Nasogastric tube is coiled within the distal esophagus. MRA NECK WO CONTRAST   Final Result   1. No evidence of acute infarct or other acute intracranial abnormality. 2. Moderate global volume loss with moderate chronic small vessel ischemic disease within the periventricular white matter. MRA NECK:      FINDINGS: The vertebral arteries are patent bilaterally and equal in size. No evidence of vertebral artery occlusion. Both vertebral arteries contribute to the basilar artery. The common carotid arteries are patent bilaterally. The internal and external carotid arteries are patent bilaterally. There is no high-grade stenosis. There is no evidence of vessel occlusion. IMPRESSION:   1. Patent carotid and vertebral arteries. No evidence of acute dissection or occlusion. MRI BRAIN WO CONTRAST   Final Result   1.  No evidence of acute infarct or other acute intracranial abnormality. 2. Moderate global volume loss with moderate chronic small vessel ischemic disease within the periventricular white matter. MRA NECK:      FINDINGS: The vertebral arteries are patent bilaterally and equal in size. No evidence of vertebral artery occlusion. Both vertebral arteries contribute to the basilar artery. The common carotid arteries are patent bilaterally. The internal and external carotid arteries are patent bilaterally. There is no high-grade stenosis. There is no evidence of vessel occlusion. IMPRESSION:   1. Patent carotid and vertebral arteries. No evidence of acute dissection or occlusion. CT ABDOMEN PELVIS WO CONTRAST Additional Contrast? None   Final Result      1. Mild bilateral hydronephrosis with ureters dilated to the level of the urinary bladder of uncertain etiology. 2. Collapsed urinary bladder with Cha catheter in place. There is suggestion of diffuse bladder wall thickening. Further evaluation with cystoscopy may be indicated. 3. Moderate age-indeterminate compression fracture of the L1 vertebral body. US RENAL COMPLETE   Final Result      Mild left hydronephrosis and mild right hydroureter. No ultrasound evidence of a renal calculus. If the patient is symptomatic for renal colic, need for further evaluation with CT abdomen can be determined clinically. Assessment/Plan   1. JUANCARLOS - secondary to obstruction/urinary retention/hypovolemia   -  Cr continuing to improve 2.2 --> 1.6, UO improving  - NS stopped, pt has NG tube  - Uprotein 435, Margarita 87, FeNa 4.0%, postrenal/obstructive JUANCARLOS  - uric acid elevated at 9.5, CK normal, rhabdo ruled out  - renal U/S revealed mild L hydronephrosis and mild R hydroureter  - follow up CT abd/pelvis wo contrast revealed mild bilateral hydronephrosis with dilated ureters as well as diffuse bladder wall thickening    2. Anion gap metabolic acidosis  - resolved    3.  Hypophosphatemia  -

## 2020-08-09 NOTE — PROGRESS NOTES
Urology Progress Note   Chau Tadeo   :  3/12/1922   MRN:  [id number]     Length of Stay:  6      Assessment:  79yo F COVID + admit with UTI, urinary retention and bilateral hydro with JUANCARLOS. Has had persistent gross hematuria since cath insertion. Today, 20, urine actually relatively clear.             Plan:  -JUANCARLOS resolved with indwelling cath. Likely due to retention.  -On Rocephin for UTI  -Gross hematuria better today with urine clear on very slow rate CBI, but has cleared and worsened again. Next step would be cystoscopy if we cannot get her off CBI. Called Dr. Valdemar Noguera today to discuss. Patient pending repeat COVID testing. Once negative, hopeful that family can come visit for more informed discussion on long term goals of care and how aggressive to be with care. Currently family wanting to pursue PEG placement due to patient failing swallow tests. If they decide to pursue more aggressive therapies, then could coordinate cysto as well.   If not, then will likely monitor given stable H/H    -Juan Gimenez Meter is POA ([phone])              Medications:     Scheduled Meds:   magnesium sulfate  2 g Intravenous Once    [Held by provider] heparin (porcine)  5,000 Units Subcutaneous 3 times per day    levothyroxine  125 mcg Oral Daily    sodium chloride flush  10 mL Intravenous 2 times per day    insulin lispro  0-6 Units Subcutaneous TID WC    insulin lispro  0-3 Units Subcutaneous Nightly        Continuous Infusions:   dextrose          PRN Meds:acetaminophen **OR** acetaminophen, sodium chloride flush, polyethylene glycol, promethazine **OR** ondansetron, [Held by provider] magnesium sulfate, glucose, dextrose, glucagon (rDNA), dextrose, perflutren lipid microspheres        24-hour Events:  No acute     Subjective: No complaints     ROS:  Unable to obtain due to mental status      Objective:     Vital signs in last 24 hours:  BP (!) 151/96   Pulse 102   Temp 97.6 °F (36.4 °C) (Axillary)   Resp 17 Ht 5' 2\" (1.575 m)   Wt 157 lb 6.5 oz (71.4 kg)   SpO2 95%   BMI 28.79 kg/m²   Temp  Av.3 °F (36.3 °C)  Min: 96.6 °F (35.9 °C)  Max: 97.6 °F (36.4 °C)    Intake/Output Summary (Last 24 hours) at 2020 1247  Last data filed at 2020 0759  Gross per 24 hour   Intake 1305 ml   Output 5150 ml   Net -3845 ml         EXAM:    Gen: No acute distress; resting comfortably in hospital bed  Neuro: alert; oriented x3; normal speech  Head: NCAT  Eyes: sclera non icteric; conjunctiva non-injected  Neck: supple; full AROM  CV: no peripheral edema    Labs:  WBC:    Lab Results   Component Value Date    WBC 6.4 2020     Hemoglobin/Hematocrit:    Lab Results   Component Value Date    HGB 10.1 2020    HCT 30.2 2020     BMP:    Lab Results   Component Value Date     2020    K 4.8 2020    K 4.8 2020     2020    CO2 22 2020    BUN 37 2020    LABALBU 3.1 2020    CREATININE 1.6 2020    CALCIUM 8.2 2020    GFRAA 36 2020    GFRAA >60 2012    LABGLOM 30 2020     PT/INR:    Lab Results   Component Value Date    PROTIME 11.8 2020    INR 1.02 2020     PTT:    Lab Results   Component Value Date    APTT 38.7 2020   [APTT    CULTURES  URINE CULTURE  Results for orders placed or performed during the hospital encounter of 20   Culture, Urine    Specimen: Urine, clean catch   Result Value Ref Range    Urine Culture, Routine (A)      <10,000 CFU/ml mixed skin/urogenital cammie. No further workup    Organism Klebsiella pneumoniae (A)     Urine Culture, Routine 50,000 CFU/ml        Susceptibility    Klebsiella pneumoniae - BACTERIAL SUSCEPTIBILITY PANEL BY SABAS     ampicillin >=32 Resistant mcg/mL     ceFAZolin* <=4 Sensitive mcg/mL      * NOTE: Cefazolin should only be used for uncomplicated UTI      for E.coli or Klebsiella pneumoniae.      cefepime <=0.12 Sensitive mcg/mL     cefTRIAXone <=0.25 Sensitive mcg/mL ciprofloxacin <=0.25 Sensitive mcg/mL     ertapenem <=0.12 Sensitive mcg/mL     gentamicin <=1 Sensitive mcg/mL     levofloxacin <=0.12 Sensitive mcg/mL     nitrofurantoin 64 Intermediate mcg/mL     piperacillin-tazobactam <=4 Sensitive mcg/mL     trimethoprim-sulfamethoxazole <=20 Sensitive mcg/mL       BLOOD CULTURE  No results found for this or any previous visit.          Remona Matter  8/9/202012:47 PM

## 2020-08-09 NOTE — PROGRESS NOTES
acetaminophen, sodium chloride flush, polyethylene glycol, promethazine **OR** ondansetron, [Held by provider] magnesium sulfate, glucose, dextrose, glucagon (rDNA), dextrose, perflutren lipid microspheres      Intake/Output Summary (Last 24 hours) at 8/9/2020 1225  Last data filed at 8/9/2020 0759  Gross per 24 hour   Intake 1305 ml   Output 5150 ml   Net -3845 ml       Physical Exam Performed:    BP (!) 151/96   Pulse 102   Temp 97.6 °F (36.4 °C) (Axillary)   Resp 17   Ht 5' 2\" (1.575 m)   Wt 157 lb 6.5 oz (71.4 kg)   SpO2 95%   BMI 28.79 kg/m²     General appearance: Alert, asking for food. HEENT: Pupils equal, round, and reactive to light. Conjunctivae/corneas clear. Neck: Susanne J collar present  Respiratory:  Normal respiratory effort. Clear to auscultation, bilaterally without Rales/Wheezes/Rhonchi. Cardiovascular: Regular rate and rhythm with normal S1/S2 without murmurs, rubs or gallops. Abdomen: Soft, non-tender, non-distended with normal bowel sounds. Musculoskeletal: No clubbing, cyanosis or edema bilaterally. Full range of motion without deformity. Skin: Skin color, texture, turgor normal.  No rashes or lesions. Neurologic:  Neurovascularly intact without any focal sensory/motor deficits.  Cranial nerves: II-XII intact, grossly non-focal.  Physical exam remains unchanged from 8/8        Labs:   Recent Labs     08/07/20  0505 08/08/20  0605 08/09/20  0320   WBC 5.1 4.8 6.4   HGB 9.5* 8.9* 10.1*   HCT 28.2* 26.0* 30.2*    135 156     Recent Labs     08/07/20  0505 08/08/20  0605 08/09/20  0320    141 137   K 4.5  4.5 4.7 4.8  4.8    109 104   CO2 25 25 22   BUN 28* 30* 37*   CREATININE 1.8* 1.7* 1.6*   CALCIUM 8.4 8.3 8.2*   PHOS 2.2* 1.3* 2.8     Recent Labs     08/07/20  0505 08/08/20  0605 08/09/20  0320   AST 26 23 24   ALT 10 10 12   BILITOT 0.3 <0.2 0.3   ALKPHOS 62 68 74     Recent Labs     08/07/20  0505   INR 1.02     No results for input(s): Amaryllis Goodell in the last 72 hours. Urinalysis:      Lab Results   Component Value Date    NITRU Negative 08/03/2020    WBCUA 335 08/03/2020    BACTERIA Rare 01/07/2020    RBCUA 77 08/03/2020    BLOODU LARGE 08/03/2020    SPECGRAV 1.013 08/03/2020    GLUCOSEU Negative 08/03/2020    GLUCOSEU NEGATIVE 05/17/2010       Radiology:  XR ABDOMEN (KUB) (SINGLE AP VIEW)   Final Result      NG tube advanced from the GE junction with tip overlying the expected position of the distal stomach. XR ABDOMEN (KUB) (SINGLE AP VIEW)   Final Result   Impression: Nasogastric tube is coiled within the distal esophagus. MRA NECK WO CONTRAST   Final Result   1. No evidence of acute infarct or other acute intracranial abnormality. 2. Moderate global volume loss with moderate chronic small vessel ischemic disease within the periventricular white matter. MRA NECK:      FINDINGS: The vertebral arteries are patent bilaterally and equal in size. No evidence of vertebral artery occlusion. Both vertebral arteries contribute to the basilar artery. The common carotid arteries are patent bilaterally. The internal and external carotid arteries are patent bilaterally. There is no high-grade stenosis. There is no evidence of vessel occlusion. IMPRESSION:   1. Patent carotid and vertebral arteries. No evidence of acute dissection or occlusion. MRI BRAIN WO CONTRAST   Final Result   1. No evidence of acute infarct or other acute intracranial abnormality. 2. Moderate global volume loss with moderate chronic small vessel ischemic disease within the periventricular white matter. MRA NECK:      FINDINGS: The vertebral arteries are patent bilaterally and equal in size. No evidence of vertebral artery occlusion. Both vertebral arteries contribute to the basilar artery. The common carotid arteries are patent bilaterally. The internal and external carotid arteries are patent bilaterally. There is no high-grade stenosis.  There is no evidence of vessel occlusion. IMPRESSION:   1. Patent carotid and vertebral arteries. No evidence of acute dissection or occlusion. CT ABDOMEN PELVIS WO CONTRAST Additional Contrast? None   Final Result      1. Mild bilateral hydronephrosis with ureters dilated to the level of the urinary bladder of uncertain etiology. 2. Collapsed urinary bladder with Cha catheter in place. There is suggestion of diffuse bladder wall thickening. Further evaluation with cystoscopy may be indicated. 3. Moderate age-indeterminate compression fracture of the L1 vertebral body. US RENAL COMPLETE   Final Result      Mild left hydronephrosis and mild right hydroureter. No ultrasound evidence of a renal calculus. If the patient is symptomatic for renal colic, need for further evaluation with CT abdomen can be determined clinically. Assessment/Plan:    Active Hospital Problems    Diagnosis Date Noted    COVID-19 [U07.1] 08/04/2020    Acute metabolic encephalopathy [P87.85] 08/03/2020       80year-old lady admitted to the hospital after a fall    COVID-19 pneumonia  Will check inflammatory markers CRP, ferritin, d-dimer  Patient is noted respiratory distress. Will hold off any dexamethasone. ID signed off    Acute metabolic encephalopathy: Slightly improved  Multifactorial UTI, renal failure    JUANCARLOS multifactorial prerenal and postrenal  PVR was reported to be 900 cc  Cont IVF as patient is NPO due to failed swallow eval. Will stop after she gets dubhoff. Renal ultrasound mild left hydronephrosis and mild right hydroureter. Seen by urology  Currently on CBI  Pinkish tinged urine  Creatinine continues to improve down to 1.6  Nephrology is following  Magnesium and phosphate have been replaced      UTI with Klebsiella  Completed 6 day course of IV abx     Hematuria of unclear etiology.   Continue CBI  And continues to have hematuria  I spoke to Dr. Pastor Patino from urology  At this point given that

## 2020-08-09 NOTE — PLAN OF CARE
Problem: Airway Clearance - Ineffective  Goal: Achieve or maintain patent airway  Outcome: Ongoing     Problem: Gas Exchange - Impaired  Goal: Absence of hypoxia  Outcome: Ongoing     Problem: Breathing Pattern - Ineffective  Goal: Ability to achieve and maintain a regular respiratory rate  Outcome: Ongoing     Problem:  Body Temperature -  Risk of, Imbalanced  Goal: Ability to maintain a body temperature within defined limits  Outcome: Ongoing     Problem: Isolation Precautions - Risk of Spread of Infection  Goal: Prevent transmission of infection  Outcome: Ongoing     Problem: Nutrition Deficits  Goal: Optimize nutrtional status  Outcome: Ongoing

## 2020-08-10 ENCOUNTER — APPOINTMENT (OUTPATIENT)
Dept: GENERAL RADIOLOGY | Age: 85
DRG: 668 | End: 2020-08-10
Attending: INTERNAL MEDICINE
Payer: COMMERCIAL

## 2020-08-10 LAB
A/G RATIO: 1 (ref 1.1–2.2)
ALBUMIN SERPL-MCNC: 3.1 G/DL (ref 3.4–5)
ALP BLD-CCNC: 76 U/L (ref 40–129)
ALT SERPL-CCNC: 12 U/L (ref 10–40)
ANION GAP SERPL CALCULATED.3IONS-SCNC: 11 MMOL/L (ref 3–16)
AST SERPL-CCNC: 24 U/L (ref 15–37)
BASOPHILS ABSOLUTE: 0 K/UL (ref 0–0.2)
BASOPHILS RELATIVE PERCENT: 0.5 %
BILIRUB SERPL-MCNC: 0.3 MG/DL (ref 0–1)
BUN BLDV-MCNC: 39 MG/DL (ref 7–20)
CALCIUM SERPL-MCNC: 8.5 MG/DL (ref 8.3–10.6)
CHLORIDE BLD-SCNC: 103 MMOL/L (ref 99–110)
CO2: 23 MMOL/L (ref 21–32)
CREAT SERPL-MCNC: 1.6 MG/DL (ref 0.6–1.2)
EOSINOPHILS ABSOLUTE: 0.2 K/UL (ref 0–0.6)
EOSINOPHILS RELATIVE PERCENT: 2.7 %
GFR AFRICAN AMERICAN: 36
GFR NON-AFRICAN AMERICAN: 30
GLOBULIN: 3 G/DL
GLUCOSE BLD-MCNC: 110 MG/DL (ref 70–99)
GLUCOSE BLD-MCNC: 123 MG/DL (ref 70–99)
GLUCOSE BLD-MCNC: 125 MG/DL (ref 70–99)
GLUCOSE BLD-MCNC: 129 MG/DL (ref 70–99)
HCT VFR BLD CALC: 28.4 % (ref 36–48)
HEMOGLOBIN: 9.5 G/DL (ref 12–16)
LYMPHOCYTES ABSOLUTE: 0.5 K/UL (ref 1–5.1)
LYMPHOCYTES RELATIVE PERCENT: 6.6 %
MAGNESIUM: 2.3 MG/DL (ref 1.8–2.4)
MCH RBC QN AUTO: 28.8 PG (ref 26–34)
MCHC RBC AUTO-ENTMCNC: 33.6 G/DL (ref 31–36)
MCV RBC AUTO: 85.9 FL (ref 80–100)
MONOCYTES ABSOLUTE: 0.8 K/UL (ref 0–1.3)
MONOCYTES RELATIVE PERCENT: 10.9 %
NEUTROPHILS ABSOLUTE: 6.1 K/UL (ref 1.7–7.7)
NEUTROPHILS RELATIVE PERCENT: 79.3 %
PDW BLD-RTO: 18.1 % (ref 12.4–15.4)
PERFORMED ON: ABNORMAL
PLATELET # BLD: 153 K/UL (ref 135–450)
PMV BLD AUTO: 7.7 FL (ref 5–10.5)
POTASSIUM REFLEX MAGNESIUM: 5.1 MMOL/L (ref 3.5–5.1)
POTASSIUM SERPL-SCNC: 5.1 MMOL/L (ref 3.5–5.1)
RBC # BLD: 3.31 M/UL (ref 4–5.2)
REPORT: NORMAL
SARS-COV-2: NOT DETECTED
SODIUM BLD-SCNC: 137 MMOL/L (ref 136–145)
THIS TEST SENT TO: NORMAL
TOTAL PROTEIN: 6.1 G/DL (ref 6.4–8.2)
WBC # BLD: 7.6 K/UL (ref 4–11)

## 2020-08-10 PROCEDURE — 83735 ASSAY OF MAGNESIUM: CPT

## 2020-08-10 PROCEDURE — 6370000000 HC RX 637 (ALT 250 FOR IP): Performed by: INTERNAL MEDICINE

## 2020-08-10 PROCEDURE — 2580000003 HC RX 258: Performed by: INTERNAL MEDICINE

## 2020-08-10 PROCEDURE — 92611 MOTION FLUOROSCOPY/SWALLOW: CPT

## 2020-08-10 PROCEDURE — 1200000000 HC SEMI PRIVATE

## 2020-08-10 PROCEDURE — 85025 COMPLETE CBC W/AUTO DIFF WBC: CPT

## 2020-08-10 PROCEDURE — 74230 X-RAY XM SWLNG FUNCJ C+: CPT

## 2020-08-10 PROCEDURE — 80053 COMPREHEN METABOLIC PANEL: CPT

## 2020-08-10 RX ADMIN — LEVOTHYROXINE SODIUM 125 MCG: 125 TABLET ORAL at 08:26

## 2020-08-10 RX ADMIN — Medication 10 ML: at 08:26

## 2020-08-10 RX ADMIN — Medication 10 ML: at 21:56

## 2020-08-10 ASSESSMENT — PAIN DESCRIPTION - PAIN TYPE: TYPE: ACUTE PAIN

## 2020-08-10 ASSESSMENT — PAIN DESCRIPTION - FREQUENCY: FREQUENCY: INTERMITTENT

## 2020-08-10 ASSESSMENT — PAIN SCALES - PAIN ASSESSMENT IN ADVANCED DEMENTIA (PAINAD)
CONSOLABILITY: 0
FACIALEXPRESSION: 0
BODYLANGUAGE: 0
NEGVOCALIZATION: 0
BREATHING: 0
TOTALSCORE: 0

## 2020-08-10 ASSESSMENT — PAIN - FUNCTIONAL ASSESSMENT: PAIN_FUNCTIONAL_ASSESSMENT: PREVENTS OR INTERFERES SOME ACTIVE ACTIVITIES AND ADLS

## 2020-08-10 ASSESSMENT — PAIN DESCRIPTION - DESCRIPTORS: DESCRIPTORS: SORE

## 2020-08-10 ASSESSMENT — PAIN SCALES - GENERAL: PAINLEVEL_OUTOF10: 5

## 2020-08-10 ASSESSMENT — PAIN DESCRIPTION - PROGRESSION: CLINICAL_PROGRESSION: NOT CHANGED

## 2020-08-10 ASSESSMENT — PAIN DESCRIPTION - ONSET: ONSET: ON-GOING

## 2020-08-10 ASSESSMENT — PAIN DESCRIPTION - ORIENTATION: ORIENTATION: MID

## 2020-08-10 ASSESSMENT — PAIN DESCRIPTION - LOCATION: LOCATION: NECK;HEAD

## 2020-08-10 NOTE — PROGRESS NOTES
Hospitalist Progress Note      PCP: Jose L Mendoza MD    Date of Admission: 8/3/2020    Chief Complaint: Fall, [facility] Course: Patient is 59-year-old female resident of assisted living history of hypothyroid, hypertension presented to [facility] ED after she had a fall. On my evaluation patient is confused so most of the history was obtained from the charts and talking to family over the phone Brinda Oliva and Gamal Johnson. Per ED notes  Patient was last seen at 10:00 by the assisted living nurse. She was sitting in a chair when they came back to see her at 12 midnight they found her on the floor. Complain of left hip and knee pain.     On arrival to ED hemodynamically stable. Blood work significant for creatinine 5.3, CO2 11, potassium 6.4 troponin 0 0.22. EKG normal sinus rhythm with some PACs.     Imaging including CT head, cervical spine small right frontal scalp contusion. Comminuted displaced left occipital condyle and C1 lateral mass fracture. Prominence of the CSF space along the right convexity may related to patient positioning as opposed to subdural hygroma. Age-related cerebral volume loss. X-ray left knee no fracture or malalignment. X-ray hip no evidence of acute finding in the pelvis. Subjective:   Very Hard of hearing.   No new complaints  Her hematuria seems to have cleared up  Tolerating tube feeds        Medications:  Reviewed    Infusion Medications    dextrose       Scheduled Medications    [Held by provider] heparin (porcine)  5,000 Units Subcutaneous 3 times per day    levothyroxine  125 mcg Oral Daily    sodium chloride flush  10 mL Intravenous 2 times per day    insulin lispro  0-6 Units Subcutaneous TID WC    insulin lispro  0-3 Units Subcutaneous Nightly     PRN Meds: acetaminophen **OR** acetaminophen, sodium chloride flush, polyethylene glycol, promethazine **OR** ondansetron, [Held by provider] magnesium sulfate, glucose, dextrose, glucagon (rDNA), dextrose, perflutren lipid microspheres      Intake/Output Summary (Last 24 hours) at 8/10/2020 0905  Last data filed at 8/10/2020 0645  Gross per 24 hour   Intake 858 ml   Output 5826 ml   Net -4968 ml       Physical Exam Performed:    /78   Pulse 109   Temp 97.3 °F (36.3 °C) (Axillary)   Resp 14   Ht 5' 2\" (1.575 m)   Wt 157 lb 6.5 oz (71.4 kg)   SpO2 94%   BMI 28.79 kg/m²     General appearance: Alert, asking for food. HEENT: Pupils equal, round, and reactive to light. Conjunctivae/corneas clear. Neck: Susanne J collar present  Respiratory:  Normal respiratory effort. Clear to auscultation, bilaterally without Rales/Wheezes/Rhonchi. Cardiovascular: Regular rate and rhythm with normal S1/S2 without murmurs, rubs or gallops. Abdomen: Soft, non-tender, non-distended with normal bowel sounds. Musculoskeletal: No clubbing, cyanosis or edema bilaterally. Full range of motion without deformity. Skin: Skin color, texture, turgor normal.  No rashes or lesions. Neurologic:  Neurovascularly intact without any focal sensory/motor deficits. Cranial nerves: II-XII intact, grossly non-focal.  Physical exam remains unchanged from 8/9        Labs:   Recent Labs     08/08/20  0605 08/09/20 0320 08/10/20  0535   WBC 4.8 6.4 7.6   HGB 8.9* 10.1* 9.5*   HCT 26.0* 30.2* 28.4*    156 153     Recent Labs     08/08/20  0605 08/09/20  0320    137   K 4.7 4.8  4.8    104   CO2 25 22   BUN 30* 37*   CREATININE 1.7* 1.6*   CALCIUM 8.3 8.2*   PHOS 1.3* 2.8     Recent Labs     08/08/20  0605 08/09/20  0320   AST 23 24   ALT 10 12   BILITOT <0.2 0.3   ALKPHOS 68 74     No results for input(s): INR in the last 72 hours. No results for input(s): Deondre Smoker in the last 72 hours.     Urinalysis:      Lab Results   Component Value Date    NITRU Negative 08/03/2020    WBCUA 335 08/03/2020    BACTERIA Rare 01/07/2020    RBCUA 77 08/03/2020    BLOODU LARGE 08/03/2020    SPECGRAV 1.013 08/03/2020    GLUCOSEU Negative ureters dilated to the level of the urinary bladder of uncertain etiology. 2. Collapsed urinary bladder with Cha catheter in place. There is suggestion of diffuse bladder wall thickening. Further evaluation with cystoscopy may be indicated. 3. Moderate age-indeterminate compression fracture of the L1 vertebral body. US RENAL COMPLETE   Final Result      Mild left hydronephrosis and mild right hydroureter. No ultrasound evidence of a renal calculus. If the patient is symptomatic for renal colic, need for further evaluation with CT abdomen can be determined clinically. Assessment/Plan:    Active Hospital Problems    Diagnosis Date Noted    COVID-19 [U07.1] 08/04/2020    Acute metabolic encephalopathy [E83.88] 08/03/2020       80year-old lady admitted to the hospital after a fall    COVID-19 pneumonia  Will check inflammatory markers CRP, ferritin, d-dimer  Patient is noted respiratory distress. Will hold off any dexamethasone. ID signed off    Acute metabolic encephalopathy: Slightly improved  Multifactorial UTI, renal failure    JUANCARLOS multifactorial prerenal and postrenal  PVR was reported to be 900 cc  Cont IVF as patient is NPO due to failed swallow eval. Will stop after she gets dubhoff. Renal ultrasound mild left hydronephrosis and mild right hydroureter. Seen by urology  Currently on CBI  Hematuria has cleared up  Basic metabolic profile pending  Nephrology is following        UTI with Klebsiella  Completed 6 day course of IV abx     Hematuria of unclear etiology. Continue CBI  And continues to have hematuria  I spoke to Dr. Drea Mendoza from urology  Hematuria has cleared up  May not need a cystoscopy      Elevated troponin in setting of JUANCARLOS. Troponin trending down. Echocardiogram pending    Fall unsure mechanical versus other reason. Follow-up on echocardiogram.  MRA and MRI reviewed no aneurysm or stroke.     Traumatic comminuted and displaced left occipital condyle and C1 left lateral mass fracture  Neurosurgery consult reviewed and appreciated  [place] J collar to be worn at all times  Cervical collar does NOT need to be worn when eating, bathing or showering  Cervical collar pads to be cleaned with soap & water, air dried, and changed daily   looks comfortable currently on as needed Tylenol    Dysphagia  Multifactorial exacerbated by her cervical collar  Per family request they want to move go ahead with the PEG tube insertion  Gastroenterology have been consulted       DVT Prophylaxis: SCD only for now given persistent hematuria have held off on the subcutaneous heparin  Diet: DIET TUBE FEED CONTINUOUS/CYCLIC NPO; STANDARD WITH FIBER (Jevity 1.2); Nasogastric; Continuous; 20; 55; 23  Dietary Nutrition Supplements: Protein Modular  Code Status: Limited    PT/OT Eval Status: Consulted    Dispo -[facility] placement. Cont inpatient care.      This chart was likely completed using voice recognition technology and may contain unintended grammatical , phraseology,and/or punctuation errors      Jayden Lindsay MD

## 2020-08-10 NOTE — CARE COORDINATION
CM following for discharge planning. Pt received second Covid-19 negative result. CM spoke with pt's son Glenys Paz who stated [facility] may be able to take pt back even with her new level of care. CM spoke with Jesus Newell and Jone Summers at [facility] and dicussed pt's level of care as two person assist and possible peg tube placement. Per Jone Summers, they will not be able to accommodate her level of care. CM circled back with son and discussed new referrals for SNF  . Referrals sent to [facility] and [facility]. Precert needed. CM to continue to follow. Addendum 16:10 [facility] can accept pt if no N/G tube. CM perfect served Dr Gonsalo Barton. Peg tube on hold and will start oral feeding. CM to continue to follow. Will need precert for SNF. CM left message for son Glenys Paz regarding [facility] able to accept pt, await call back from son. CM department to follow up with [facility] in am to start precert if son in agreement. Addendum 16:25 CM received phone call from Nereyda at [facility], they are still looking at patient and will follow up on 8/10/2020.     Onesimo Huitron RN, BSN, [id number]  Case Management Department  [phone]

## 2020-08-10 NOTE — PROGRESS NOTES
Nephrology Note                                                                                                                                                                                                                                                                                                                                                               Office : [phone]     Fax :[phone]      Patient's Name: Douglas Nunez  10:16 AM  8/10/2020    Reason for Consult:  Elevated Cr    History of Present Ilness:    Douglas Nunez is a 80 y.o. female with PMH significant for carcinoma in situ of colon, pernicious anemia, and hypothyroidism. She presented to [facility] ED because she fell and was found on the floor. She is complaining of left hip and knee pain, as well as pain at the back of her head but she denies loss of consciousness. Denies chest pain, abdominal pain, and numbness or tingling. Denies being on blood thinner. According to her son, she ambulates a little bit with a walker, but mainly uses a wheelchair. He reports that she has been a little more confused in the last week. INTERVAL HISTORY    Feels same  Shortness of breath: No   UOP: Fair  Creat: trending down      Past Medical History:   Diagnosis Date    Carcinoma in situ of colon 1998    Diarrhea     Esophageal reflux     Other extrapyramidal disease and abnormal movement disorder     Pernicious anemia     S/P colonoscopy 4/09    OK - no further eval.    Unspecified cataract     Unspecified hypothyroidism     Unspecified pruritic disorder        Past Surgical History:   Procedure Laterality Date    COLECTOMY      partial    COLONOSCOPY  4/09    OK - no further eval    HYSTERECTOMY         No family history on file. reports that she has never smoked. She has never used smokeless tobacco. She reports that she does not drink alcohol or use drugs.     Allergies:  Bee venom    Current Medications: acetaminophen (TYLENOL) tablet 650 mg, Q6H PRN    Or  acetaminophen (TYLENOL) suppository 650 mg, Q6H PRN  [Held by provider] heparin (porcine) injection 5,000 Units, 3 times per day  levothyroxine (SYNTHROID) tablet 125 mcg, Daily  sodium chloride flush 0.9 % injection 10 mL, 2 times per day  sodium chloride flush 0.9 % injection 10 mL, PRN  polyethylene glycol (GLYCOLAX) packet 17 g, Daily PRN  promethazine (PHENERGAN) tablet 12.5 mg, Q6H PRN    Or  ondansetron (ZOFRAN) injection 4 mg, Q6H PRN  [Held by provider] magnesium sulfate 2 g in 50 mL IVPB premix, PRN  insulin lispro (1 Unit Dial) 0-6 Units, TID WC  insulin lispro (1 Unit Dial) 0-3 Units, Nightly  glucose (GLUTOSE) 40 % oral gel 15 g, PRN  dextrose 50 % IV solution, PRN  glucagon (rDNA) injection 1 mg, PRN  dextrose 5 % solution, PRN  perflutren lipid microspheres (DEFINITY) injection 1.65 mg, ONCE PRN        Physical exam:     Vitals:  /78   Pulse 109   Temp 97.3 °F (36.3 °C) (Axillary)   Resp 14   Ht 5' 2\" (1.575 m)   Wt 157 lb 6.5 oz (71.4 kg)   SpO2 94%   BMI 28.79 kg/m²   Constitutional:  resting, NAD  HEENT: DHT in place  Cardiovascular:  S1, S2 reg  Ext: no lower extremity edema  CNS:  no agitation   : Cha in place    Limited exam to preserve PPE    Data:   Labs:  CBC:   Recent Labs     08/08/20  0605 08/09/20  0320 08/10/20  0535   WBC 4.8 6.4 7.6   HGB 8.9* 10.1* 9.5*    156 153     BMP:    Recent Labs     08/08/20  0605 08/09/20  0320 08/10/20  0535    137 137   K 4.7 4.8  4.8 5.1  5.1    104 103   CO2 25 22 23   BUN 30* 37* 39*   CREATININE 1.7* 1.6* 1.6*   GLUCOSE 147* 113* 125*     Ca/Mg/Phos:   Recent Labs     08/08/20  0605 08/09/20  0320 08/10/20  0535   CALCIUM 8.3 8.2* 8.5   MG  --  1.70* 2.30   PHOS 1.3* 2.8  --      Hepatic:   Recent Labs     08/08/20  0605 08/09/20  0320 08/10/20  0535   AST 23 24 24   ALT 10 12 12   BILITOT <0.2 0.3 0.3   ALKPHOS 68 74 76     Troponin:   No results for input(s): TROPONINI in the last 72 hours. BNP: No results for input(s): BNP in the last 72 hours. Lipids: No results for input(s): CHOL, TRIG, HDL, LDLCALC, LABVLDL in the last 72 hours. ABGs: No results for input(s): PHART, PO2ART, WPH1OCR in the last 72 hours. INR:   No results for input(s): INR in the last 72 hours. UA:  No results for input(s): Raheem Bombard, GLUCOSEU, BILIRUBINUR, KETUA, SPECGRAV, BLOODU, PHUR, PROTEINU, UROBILINOGEN, NITRU, LEUKOCYTESUR, Trinna Antis in the last 72 hours. Urine Microscopic:   No results for input(s): LABCAST, BACTERIA, COMU, HYALCAST, WBCUA, RBCUA, EPIU in the last 72 hours. Urine Culture:   No results for input(s): LABURIN in the last 72 hours. Urine Chemistry:   No results for input(s): Zapata Georgian, PROTEINUR, NAUR in the last 72 hours. IMAGING:  XR ABDOMEN (KUB) (SINGLE AP VIEW)   Final Result      NG tube advanced from the GE junction with tip overlying the expected position of the distal stomach. XR ABDOMEN (KUB) (SINGLE AP VIEW)   Final Result   Impression: Nasogastric tube is coiled within the distal esophagus. MRA NECK WO CONTRAST   Final Result   1. No evidence of acute infarct or other acute intracranial abnormality. 2. Moderate global volume loss with moderate chronic small vessel ischemic disease within the periventricular white matter. MRA NECK:      FINDINGS: The vertebral arteries are patent bilaterally and equal in size. No evidence of vertebral artery occlusion. Both vertebral arteries contribute to the basilar artery. The common carotid arteries are patent bilaterally. The internal and external carotid arteries are patent bilaterally. There is no high-grade stenosis. There is no evidence of vessel occlusion. IMPRESSION:   1. Patent carotid and vertebral arteries. No evidence of acute dissection or occlusion. MRI BRAIN WO CONTRAST   Final Result   1.  No evidence of acute infarct or other acute

## 2020-08-10 NOTE — PROGRESS NOTES
Urology Attending Progress Note      Subjective: Resting quietly. NAD    Vitals:  /78   Pulse 109   Temp 97.3 °F (36.3 °C) (Axillary)   Resp 14   Ht 5' 2\" (1.575 m)   Wt 157 lb 6.5 oz (71.4 kg)   SpO2 94%   BMI 28.79 kg/m²   Temp  Av.5 °F (36.4 °C)  Min: 97 °F (36.1 °C)  Max: 98.6 °F (37 °C)    Intake/Output Summary (Last 24 hours) at 8/10/2020 0942  Last data filed at 8/10/2020 0645  Gross per 24 hour   Intake 858 ml   Output 5826 ml   Net -4968 ml       Exam: abd soft. Cha draining clear urine off CBI    Labs:  WBC:    Lab Results   Component Value Date    WBC 7.6 08/10/2020     Hemoglobin/Hematocrit:    Lab Results   Component Value Date    HGB 9.5 08/10/2020    HCT 28.4 08/10/2020     BMP:    Lab Results   Component Value Date     08/10/2020    K 5.1 08/10/2020    K 5.1 08/10/2020     08/10/2020    CO2 23 08/10/2020    BUN 39 08/10/2020    LABALBU 3.1 08/10/2020    CREATININE 1.6 08/10/2020    CALCIUM 8.5 08/10/2020    GFRAA 36 08/10/2020    GFRAA >60 2012    LABGLOM 30 08/10/2020     PT/INR:    Lab Results   Component Value Date    PROTIME 11.8 2020    INR 1.02 2020     PTT:    Lab Results   Component Value Date    APTT 38.7 2020   [APTT        Urine Culture:  Klebsiella         Antibiotic Therapy:  None     Imaging: no new       Impression/Plan: 79yo F COVID + admit with UTI, urinary retention and bilateral hydro with JUANCARLOS.  Has had persistent gross hematuria since cath insertion    -urine clear off CBI. Will monitor urine   -Cr stable @ 1.6  -repeat COVID test pending   -may consider cysto once COVID neg.  No urgency for now as urine is clear   Ashleigh James, APRN - CNP

## 2020-08-10 NOTE — PROGRESS NOTES
RN gave phone report to [facility] at this time.   RN placed transport orders

## 2020-08-10 NOTE — PROCEDURES
INSTRUMENTAL SWALLOW REPORT  MODIFIED BARIUM SWALLOW    NAME: Chau Tadeo   : 3/12/1922  MRN: [id number]       Date of Eval: 8/10/2020     Ordering Physician: Dr. Valdemar Noguera  Radiologist: Dr. Sweeney Poster     Referring Diagnosis(es): Referring Diagnosis: Oropharyngeal Dysphagia s/p fall    Past Medical History:  has a past medical history of Carcinoma in situ of colon, Diarrhea, Esophageal reflux, Other extrapyramidal disease and abnormal movement disorder, Pernicious anemia, S/P colonoscopy, Unspecified cataract, Unspecified hypothyroidism, and Unspecified pruritic disorder. Past Surgical History:  has a past surgical history that includes colectomy; Colonoscopy (); and Hysterectomy. Current Diet Solid Consistency: NPO  Current Diet Liquid Consistency: NPO  Type of Study: Initial MBS       Recent CXR: 8/3/2020  Impression    No acute disease. Patient Complaints/Reason for Referral:  Chau Tadeo was referred for a MBS to assess the efficiency of his/her swallow function, assess for aspiration, and to make recommendations regarding safe dietary consistencies, effective compensatory strategies, and safe eating environment. Patient complaints: Throughout study \"I need some water\"    Onset of problem:   Date of Onset: 8/3/2020  General Comment  Comments: Pt crying out \"Help me!!\" and \"I want to go home\" prior to and after study. The study was completed with the cervical collar removed and it was replaced following MBS. Subjective  Subjective: Pt presented 8/3 s/p fall from assisted living facility. CT revealing comminuted displaced left occipital condyle and C1 lateral mass fracture. BSE and subsequent followup dysphagia treatment sessions demonstrated significant concerns for pharyngeal phase dysphagia with overt coughing, choking and vocal wetness with all PO given. Pt presents with date for MBS to evaluate pharyngeal phase. Behavior/Cognition/Vision/Hearing:  Behavior/Cognition: Alert;Confused; Requires cueing  Patient Position: Lateral and Patient Degrees: 90* upright  Consistencies Administered: Dysphagia Soft and Bite-Sized (Dysphagia III); Pudding teaspoon;Honey teaspoon;Nectar  teaspoon;Nectar straw; Thin cup; Thin teaspoon; Thin straw  Compensatory Swallowing Strategies Attempted: (Pt unable to comprehend any cues)     Impressions:  Oral Phase: Pt presents with moderate oral phase dysphagia with anterior loss of thins via cup, reduced bolus cohesion and propulsion with lingual pumping with all textures. Pt with residue along palate and mid-posterior portion of tongue, base of tongue with pudding thick via tsp. Pt demonstrating piecemeal swallowing intermittently during study. Pharyngeal: Pt presents with reduced anterior movement of hyolaryngeal mechanics with absent epiglottic distention resulting in aspiration of all textures trialed with the exception of soft solid (although during mastication of soft solid the pt had moderate amount of silent aspiration of pudding thick tsp). The patient demonstrated delayed swallow initiation with less viscous liquids contributing to aspiration / aspiration risk and residue after the swallow in the anterior portion of the laryngeal vestibule which likely resulted in ongoing trace aspiration after the study was complete. The patient had increased coating and residue when given bolus with increased viscosity in the valleculae and on posterior pharyngeal wall. Initially pt tolerated pudding thick via tsp with flash penetration x2 that was partially clearing. The pt was given soft solid and incidentally took pudding without biting solid which resulted in SILENT aspiration of pudding thick residue that was coated to underside of epiglottis. The patient did have intermittent use of second swallows although this is not clear if this was due to oral residue or if pt was sensate for pharyngeal residue.  The patients dysphagia is exacerbated by inability to self administer bolus via cup, inability to hear or use visual cues to understand compensation techniques such as cough/reswallow. Dysphagia Outcome Severity Scale: Level 1: Severe dysphagia- NPO. Unable to tolerate any PO safely  Penetration-Aspiration Scale (PAS): 8 - Material Enters the airway, passes below the vocal folds, and no effort is made to eject    Recommended Diet:  Solid consistency: NPO(VS PO for QOL)  Liquid consistency: NPO  Medication administration: Via NG/PEG    Safe Swallow Protocol:  Supervision: (If PO is desired for QOL would rec 1:1 assist)  Compensatory Swallowing Strategies: Alternate solids and liquids;Eat/Feed slowly;Upright as possible for all oral intake;Remain upright for 30-45 minutes after meals;Small bites/sips(If PO is desired for QOL. Also frequent oral care)    Recommendations/Treatment  Requires SLP Intervention: Yes(If aggressive measures are desired)  Recommendations: F/U MBS  D/C Recommendations: To be determined     Recommended Exercises:    Therapeutic Interventions: Effortful swallow;Diet tolerance monitoring;Patient/Family education; Therapeutic PO trials with SLP(If aggressive measures are desired)     Education:   Patient Education: Patient was unable to demonstrate any comprehension of information this date  Patient Education Response: No evidence of learning    Prognosis  Prognosis for safe diet advancement: poor  Barriers to reach goals: age;severity of dysphagia;time post onset;other (comment)  Barriers/Prognosis Comment: Inability to hear/see this date  Duration/Frequency of Treatment  Duration/Frequency of Treatment: 3-5x week for length of stay  Safety Devices  Safety Devices in place: (Pt left radiology via transport)    Goals:    Short Term:  Goal 1: Pt will tolerate PO at bedside without overt s/s of aspiration. Goal 2: Patient/caregiver will demonstrate understanding of recommendations for swallowing safety/function.   8/10 - Following MBS, Pts HCPOA, Tiesha Head, was attempted by phone Pudding teaspoon;Honey teaspoon;Nectar teaspoon; Thin cup; Thin straw;Nectar straw  Aspiration After: Pudding teaspoon(During mastication of solid)  Trace Aspiration: Nectar straw  No Cough Reflex: Pudding teaspoon  Delayed Cough Reflex: Thin straw; Thin cup; Thin teaspoon;Nectar teaspoon;Nectar straw;Honey teaspoon  No Bolus Expelled: All  Pharyngeal Residue - Valleculae: All(increased with increased viscosity of liquids/pudding.  Not noted to be significantly increased with soft solid)  Pharyngeal Residue - Posterior Pharynx: Pudding teaspoon    Esophageal Phase  Esophageal Screen: WFL    Pain   Patient Currently in Pain: No; Following study pt reported head pain but after transfer stopped reporting this    Therapy Time:   Individual Concurrent Group Co-treatment   Time In 1445 0000 0000 0000   Time Out 1515 0000 0000 0000   Minutes 30 0 0 0   Variance: 0  Timed Code Treatment Minutes: 0 Minutes  Total Treatment Time: 30    Angelica Huff M.A., [place]  Speech-Language Pathologist

## 2020-08-10 NOTE — PROGRESS NOTES
Palliative Care Chart Review  and Check in Note:     NAME:  Stephany Date: 8/3/2020  Hospital Day:  Hospital Day: 8   Current Code status: Limited    Palliative care is continuing to following Ms. Caryle Nip for symptom management,  and goals of care discussion as needed. Patient's chart reviewed today 8/10/20. Called Vinod Armenta, and gave updates regarding course of treatment. Pt currently with Dobhoff, but subjectively improved bloody urine output. Pending urology to see pt today to determine need for cystoscopy. 2nd COVID result also negative in UC care everywhere. SLP to see again today and possibly do MBS in order to determine need for PEG tube as well. SW updated regarding 2nd negative COVID result and to contact son regarding discharge options. Son had video calls with mother over the weekend and saw some improvement in mental status and is confident regarding decision to place PEG if needed. The following are the currently established goals/code status, and Symptom management. Goals of care:  1. Goals of Care/Advanced Care planning/Code status: DNR/DNI (Limitedx4)  2. Pain: Medicated as needed for c/o pain per the advanced dementia pain scale. 3. SOB: Satting well on room air  4. AMS: likely 2/2 acute metabolic encephalopathy in setting of UTI on JUANCARLOS  5.  Dysphagia: 2/2 AMS and possibly some contribution of C1 fracture, unlikely to improve in the short term  6. Disposition: TBD pending hospital course and ongoing family discussions regarding goals of care    Code status: Limitedx4 (DNR/DNI)    Discharge plan: TBD pending hospital course, need for cysto and/or PEG    Electronically signed by Li Dunn MD on 8/10/2020 at 9:48 AM

## 2020-08-10 NOTE — CONSULTS
[address] [place] [address]  GI Consultation      Patient: Nathan Washington  : 3/12/1922       Date:  8/10/2020    Subjective:       History of Present Illness  Patient is a 80 y.o.  female admitted with Acute metabolic encephalopathy [S08.24] who is seen in consult for PEG tube placement. Patient is 63-year-old female resident of assisted living history of hypothyroid, hypertension presented to [facility] ED after she had a fall. C1 lateral mass fracture, did not pass swallow evaluation. Dysphagia exacerbated by C collar. Family requests PEG. At this time this is no urgency in terms of placing PEG. Urology would like to coordinate cysto with PEG placement. Per urology progress note today there is no urgency for the cysto as urine is clear. Past Medical History:   Diagnosis Date    Carcinoma in situ of colon     Diarrhea     Esophageal reflux     Other extrapyramidal disease and abnormal movement disorder     Pernicious anemia     S/P colonoscopy     OK - no further eval.    Unspecified cataract     Unspecified hypothyroidism     Unspecified pruritic disorder       Past Surgical History:   Procedure Laterality Date    COLECTOMY      partial    COLONOSCOPY      OK - no further eval    HYSTERECTOMY            Admission Meds  No current facility-administered medications on file prior to encounter. Current Outpatient Medications on File Prior to Encounter   Medication Sig Dispense Refill    FLUZONE QUADRIVALENT 0.5 ML injection TO BE ADMINISTERED BY PHARMACIST FOR IMMUNIZATION  0    triamcinolone (KENALOG) 0.1 % cream Apply topically 2 times daily.  1 Tube 0    potassium chloride (KLOR-CON M) 20 MEQ extended release tablet Take 0.5 tablets by mouth daily 30 tablet 0    latanoprost (XALATAN) 0.005 % ophthalmic solution Place 1 drop into both eyes nightly      lisinopril (PRINIVIL;ZESTRIL) 2.5 MG tablet Take 2.5 mg by mouth daily      metoprolol tartrate (LOPRESSOR) 25 MG tablet Take 25 mg by mouth 2 times daily      calcium elemental (OYSCO 500) 500 MG TABS tablet Take 500 mg by mouth 2 times daily       solifenacin (VESICARE) 5 MG tablet Take 5 mg by mouth daily      vitamin D (CHOLECALCIFEROL) 1000 UNIT TABS tablet Take 2,000 Units by mouth daily      hydrALAZINE (APRESOLINE) 10 MG tablet Take 10 mg by mouth 2 times daily as needed      polyethylene glycol (GLYCOLAX) powder Take 17 g by mouth 2 times daily as needed      senna-docusate (PERICOLACE) 8.6-50 MG per tablet Take 1 tablet by mouth 2 times daily as needed for Constipation      loratadine (CLARITIN) 10 MG tablet Take 10 mg by mouth daily      aspirin 325 MG EC tablet Take 1 tablet by mouth daily 30 tablet 0    dorzolamide-timolol (COSOPT) 22.3-6.8 MG/ML ophthalmic solution Place 1 drop into both eyes every 12 hours      Flax OIL Take by mouth daily      acetaminophen (AMINOFEN) 325 MG tablet Take 2 tablets by mouth every 6 hours as needed for Pain (Patient taking differently: Take 650 mg by mouth 2 times daily ) 120 tablet 0    ranitidine (ZANTAC) 150 MG tablet TAKE ONE TABLET BY MOUTH TWICE A  tablet 1    levothyroxine (SYNTHROID) 125 MCG tablet TAKE ONE TABLET BY MOUTH DAILY 90 tablet 1    cyanocobalamin 1000 MCG/ML injection INJECT 1 ML ONCE EVERY MONTH 1 vial 11    Compression Stockings MISC by Does not apply route 20-30 mmHg -- knee high 1 each 1    SYRINGE-NEEDLE, DISP, 3 ML (B-D 3CC LUER-ENEDINA SYR 25GX5/8\") 25G X 5/8\" 3 ML MISC 1 mL by Alternating Nares route every 30 days 15 each 1    Needles & Syringes MISC by Does not apply route. Patient ASA use. Allergies  Allergies   Allergen Reactions    Bee Venom       Social   Social History     Tobacco Use    Smoking status: Never Smoker    Smokeless tobacco: Never Used   Substance Use Topics    Alcohol use: No        No family history on file. No family history of colon cancer, Crohn's disease, or ulcerative colitis.     Review of Systems  Review of systems not obtained due to patient factors. Physical Exam    /79   Pulse 107   Temp 97.5 °F (36.4 °C) (Axillary)   Resp 16   Ht 5' 2\" (1.575 m)   Wt 157 lb 6.5 oz (71.4 kg)   SpO2 93%   BMI 28.79 kg/m²   General appearance: alert, cooperative, no distress, appears stated age  Anicteric, No Jaundice  Head: Normocephalic, without obvious abnormality  Lungs: clear to auscultation bilaterally, Normal Effort  Heart: regular rate and rhythm, normal S1 and S2, no murmurs or rubs  Abdomen: BSx4, no tenderness, no masses noted  Extremities: atraumatic, no cyanosis or edema  Skin: warm and dry        Data Review:    Recent Labs     08/08/20  0605 08/09/20  0320 08/10/20  0535   WBC 4.8 6.4 7.6   HGB 8.9* 10.1* 9.5*   HCT 26.0* 30.2* 28.4*   MCV 85.0 85.1 85.9    156 153     Recent Labs     08/08/20  0605 08/09/20  0320 08/10/20  0535    137 137   K 4.7 4.8  4.8 5.1  5.1    104 103   CO2 25 22 23   PHOS 1.3* 2.8  --    BUN 30* 37* 39*   CREATININE 1.7* 1.6* 1.6*     Recent Labs     08/08/20  0605 08/09/20  0320 08/10/20  0535   AST 23 24 24   ALT 10 12 12   BILITOT <0.2 0.3 0.3   ALKPHOS 68 74 76     No results for input(s): LIPASE, AMYLASE in the last 72 hours. No results for input(s): PROTIME, INR in the last 72 hours. No results for input(s): PTT in the last 72 hours. No results for input(s): OCCULTBLD in the last 72 hours. Imaging Studies:                                         Assessment:     Active Problems:    Acute metabolic encephalopathy    COVID-19  Resolved Problems:    * No resolved hospital problems. *        Recommendations:     - No urgent need for PEG placement, will coordinate with urology     Thank you for the opportunity to participate in [address] [place] [address].

## 2020-08-10 NOTE — PROGRESS NOTES
4 Eyes Admission Assessment     I agree as the admission nurse that 2 RN's have performed a thorough Head to Toe Skin Assessment on the patient. ALL assessment sites listed below have been assessed on admission. Areas assessed by both nurses: Yes  [x]   Head, Face, and Ears WDL  [x]   Shoulders, Back, and Chest WDL  [x]   Arms, Elbows, and Hands L arm skin tear  [x]   Coccyx, Sacrum, and Ischum Redness  [x]   Legs, Feet, and Heels L great toe skin tear        Does the Patient have Skin Breakdown?   No         Jonathan Prevention initiated:  Yes   Wound Care Orders initiated:  No      [facility] nurse consulted for Pressure Injury (Stage 3,4, Unstageable, DTI, NWPT, and Complex wounds):  No      Nurse 1 eSignature: Electronically signed by Marcos Pinto RN on 8/10/20 at [address] PM EDT    **SHARE this note so that the co-signing nurse is able to place an eSignature**    Nurse 2 eSignature: Electronically signed by Quinton Merlin, RN on 8/10/20 at 7:02 PM EDT

## 2020-08-10 NOTE — PROGRESS NOTES
Updated patient's son Markell Moore, on patient status and plan of care. All questions answered at this time.  Electronically signed by Yeyo Carranza RN on 8/10/2020 at 7:18 AM

## 2020-08-11 ENCOUNTER — APPOINTMENT (OUTPATIENT)
Dept: GENERAL RADIOLOGY | Age: 85
DRG: 668 | End: 2020-08-11
Attending: INTERNAL MEDICINE
Payer: COMMERCIAL

## 2020-08-11 ENCOUNTER — ANESTHESIA EVENT (OUTPATIENT)
Dept: ENDOSCOPY | Age: 85
DRG: 668 | End: 2020-08-11
Payer: COMMERCIAL

## 2020-08-11 LAB
A/G RATIO: 1.2 (ref 1.1–2.2)
ALBUMIN SERPL-MCNC: 3.2 G/DL (ref 3.4–5)
ALP BLD-CCNC: 78 U/L (ref 40–129)
ALT SERPL-CCNC: 11 U/L (ref 10–40)
ANION GAP SERPL CALCULATED.3IONS-SCNC: 10 MMOL/L (ref 3–16)
AST SERPL-CCNC: 22 U/L (ref 15–37)
BASOPHILS ABSOLUTE: 0.1 K/UL (ref 0–0.2)
BASOPHILS RELATIVE PERCENT: 0.8 %
BILIRUB SERPL-MCNC: 0.3 MG/DL (ref 0–1)
BUN BLDV-MCNC: 45 MG/DL (ref 7–20)
CALCIUM SERPL-MCNC: 8.4 MG/DL (ref 8.3–10.6)
CHLORIDE BLD-SCNC: 105 MMOL/L (ref 99–110)
CO2: 23 MMOL/L (ref 21–32)
CREAT SERPL-MCNC: 1.5 MG/DL (ref 0.6–1.2)
EOSINOPHILS ABSOLUTE: 0.4 K/UL (ref 0–0.6)
EOSINOPHILS RELATIVE PERCENT: 5.6 %
GFR AFRICAN AMERICAN: 39
GFR NON-AFRICAN AMERICAN: 32
GLOBULIN: 2.6 G/DL
GLUCOSE BLD-MCNC: 102 MG/DL (ref 70–99)
GLUCOSE BLD-MCNC: 108 MG/DL (ref 70–99)
GLUCOSE BLD-MCNC: 113 MG/DL (ref 70–99)
GLUCOSE BLD-MCNC: 116 MG/DL (ref 70–99)
GLUCOSE BLD-MCNC: 125 MG/DL (ref 70–99)
HCT VFR BLD CALC: 25.6 % (ref 36–48)
HEMOGLOBIN: 8.7 G/DL (ref 12–16)
INR BLD: 1.1 (ref 0.86–1.14)
LV EF: 48 %
LVEF MODALITY: NORMAL
LYMPHOCYTES ABSOLUTE: 0.7 K/UL (ref 1–5.1)
LYMPHOCYTES RELATIVE PERCENT: 11.6 %
MAGNESIUM: 2.3 MG/DL (ref 1.8–2.4)
MCH RBC QN AUTO: 28.9 PG (ref 26–34)
MCHC RBC AUTO-ENTMCNC: 33.9 G/DL (ref 31–36)
MCV RBC AUTO: 85.2 FL (ref 80–100)
MONOCYTES ABSOLUTE: 0.8 K/UL (ref 0–1.3)
MONOCYTES RELATIVE PERCENT: 12.8 %
NEUTROPHILS ABSOLUTE: 4.4 K/UL (ref 1.7–7.7)
NEUTROPHILS RELATIVE PERCENT: 69.2 %
PDW BLD-RTO: 18.8 % (ref 12.4–15.4)
PERFORMED ON: ABNORMAL
PLATELET # BLD: 157 K/UL (ref 135–450)
PMV BLD AUTO: 8.2 FL (ref 5–10.5)
POTASSIUM REFLEX MAGNESIUM: 5.4 MMOL/L (ref 3.5–5.1)
POTASSIUM SERPL-SCNC: 5.4 MMOL/L (ref 3.5–5.1)
PROTHROMBIN TIME: 12.8 SEC (ref 10–13.2)
RBC # BLD: 3.01 M/UL (ref 4–5.2)
SODIUM BLD-SCNC: 138 MMOL/L (ref 136–145)
TOTAL PROTEIN: 5.8 G/DL (ref 6.4–8.2)
TRIGL SERPL-MCNC: 123 MG/DL (ref 0–150)
WBC # BLD: 6.3 K/UL (ref 4–11)

## 2020-08-11 PROCEDURE — 80053 COMPREHEN METABOLIC PANEL: CPT

## 2020-08-11 PROCEDURE — C8929 TTE W OR WO FOL WCON,DOPPLER: HCPCS

## 2020-08-11 PROCEDURE — 85025 COMPLETE CBC W/AUTO DIFF WBC: CPT

## 2020-08-11 PROCEDURE — 6370000000 HC RX 637 (ALT 250 FOR IP): Performed by: INTERNAL MEDICINE

## 2020-08-11 PROCEDURE — 85610 PROTHROMBIN TIME: CPT

## 2020-08-11 PROCEDURE — 83735 ASSAY OF MAGNESIUM: CPT

## 2020-08-11 PROCEDURE — 2580000003 HC RX 258: Performed by: INTERNAL MEDICINE

## 2020-08-11 PROCEDURE — 1200000000 HC SEMI PRIVATE

## 2020-08-11 PROCEDURE — 36415 COLL VENOUS BLD VENIPUNCTURE: CPT

## 2020-08-11 PROCEDURE — 73502 X-RAY EXAM HIP UNI 2-3 VIEWS: CPT

## 2020-08-11 RX ADMIN — ACETAMINOPHEN 650 MG: 650 SUPPOSITORY RECTAL at 04:23

## 2020-08-11 RX ADMIN — Medication 10 ML: at 19:44

## 2020-08-11 ASSESSMENT — PAIN SCALES - GENERAL
PAINLEVEL_OUTOF10: 0
PAINLEVEL_OUTOF10: 4
PAINLEVEL_OUTOF10: 5

## 2020-08-11 ASSESSMENT — PAIN DESCRIPTION - DESCRIPTORS: DESCRIPTORS: SORE

## 2020-08-11 ASSESSMENT — PAIN DESCRIPTION - ORIENTATION: ORIENTATION: MID

## 2020-08-11 ASSESSMENT — PAIN SCALES - WONG BAKER: WONGBAKER_NUMERICALRESPONSE: 0

## 2020-08-11 ASSESSMENT — PAIN DESCRIPTION - LOCATION: LOCATION: NECK;HEAD

## 2020-08-11 ASSESSMENT — PAIN SCALES - PAIN ASSESSMENT IN ADVANCED DEMENTIA (PAINAD)
FACIALEXPRESSION: 0
BODYLANGUAGE: 0
TOTALSCORE: 0
NEGVOCALIZATION: 0
CONSOLABILITY: 0
BREATHING: 0

## 2020-08-11 ASSESSMENT — PAIN DESCRIPTION - FREQUENCY: FREQUENCY: INTERMITTENT

## 2020-08-11 ASSESSMENT — PAIN DESCRIPTION - PAIN TYPE: TYPE: ACUTE PAIN

## 2020-08-11 ASSESSMENT — PAIN DESCRIPTION - PROGRESSION: CLINICAL_PROGRESSION: NOT CHANGED

## 2020-08-11 NOTE — PROGRESS NOTES
Hospitalist Progress Note      PCP: Abhishek Alejo MD    Date of Admission: 8/3/2020    Chief Complaint: Fall, [facility] Hospital Course: Patient is 80-year-old female resident of assisted living history of hypothyroid, hypertension presented to [facility] ED after she had a fall. On my evaluation patient is confused so most of the history was obtained from the charts and talking to family over the phone Johanna Bautista. Per ED notes  Patient was last seen at 10:00 by the assisted living nurse. She was sitting in a chair when they came back to see her at 12 midnight they found her on the floor. Complain of left hip and knee pain.     On arrival to ED hemodynamically stable. Blood work significant for creatinine 5.3, CO2 11, potassium 6.4 troponin 0 0.22. EKG normal sinus rhythm with some PACs.     Imaging including CT head, cervical spine small right frontal scalp contusion. Comminuted displaced left occipital condyle and C1 lateral mass fracture. Prominence of the CSF space along the right convexity may related to patient positioning as opposed to subdural hygroma. Age-related cerebral volume loss. X-ray left knee no fracture or malalignment. X-ray hip no evidence of acute finding in the pelvis. Subjective:   Very Hard of hearing. Was very lethargic in the morning initially, then family came by to visit, patient was awake and interactive with her son. She indicated pain in left hip.          Medications:  Reviewed    Infusion Medications    dextrose       Scheduled Medications    [Held by provider] heparin (porcine)  5,000 Units Subcutaneous 3 times per day    levothyroxine  125 mcg Oral Daily    sodium chloride flush  10 mL Intravenous 2 times per day    insulin lispro  0-6 Units Subcutaneous TID WC    insulin lispro  0-3 Units Subcutaneous Nightly     PRN Meds: acetaminophen **OR** acetaminophen, sodium chloride flush, polyethylene glycol, promethazine **OR** ondansetron, [Held by 08/03/2020    BACTERIA Rare 01/07/2020    RBCUA 77 08/03/2020    BLOODU LARGE 08/03/2020    SPECGRAV 1.013 08/03/2020    GLUCOSEU Negative 08/03/2020    GLUCOSEU NEGATIVE 05/17/2010       Radiology:  FL MODIFIED BARIUM SWALLOW W VIDEO   Final Result      Deep penetration/aspiration observed with essentially all substances examined as described above. Please see speech therapy report for further comments and recommendations. XR ABDOMEN (KUB) (SINGLE AP VIEW)   Final Result      NG tube advanced from the GE junction with tip overlying the expected position of the distal stomach. XR ABDOMEN (KUB) (SINGLE AP VIEW)   Final Result   Impression: Nasogastric tube is coiled within the distal esophagus. MRA NECK WO CONTRAST   Final Result   1. No evidence of acute infarct or other acute intracranial abnormality. 2. Moderate global volume loss with moderate chronic small vessel ischemic disease within the periventricular white matter. MRA NECK:      FINDINGS: The vertebral arteries are patent bilaterally and equal in size. No evidence of vertebral artery occlusion. Both vertebral arteries contribute to the basilar artery. The common carotid arteries are patent bilaterally. The internal and external carotid arteries are patent bilaterally. There is no high-grade stenosis. There is no evidence of vessel occlusion. IMPRESSION:   1. Patent carotid and vertebral arteries. No evidence of acute dissection or occlusion. MRI BRAIN WO CONTRAST   Final Result   1. No evidence of acute infarct or other acute intracranial abnormality. 2. Moderate global volume loss with moderate chronic small vessel ischemic disease within the periventricular white matter. MRA NECK:      FINDINGS: The vertebral arteries are patent bilaterally and equal in size. No evidence of vertebral artery occlusion. Both vertebral arteries contribute to the basilar artery.       The common carotid arteries are patent cleared up  May not need a cystoscopy      Elevated troponin in setting of JUANCARLOS. Troponin trending down. Echocardiogram pending    Fall unsure mechanical versus other reason. Follow-up on echocardiogram.  MRA and MRI reviewed no aneurysm or stroke. Traumatic comminuted and displaced left occipital condyle and C1 left lateral mass fracture  Neurosurgery consult reviewed and appreciated  [place] J collar to be worn at all times  Cervical collar does NOT need to be worn when eating, bathing or showering  Cervical collar pads to be cleaned with soap & water, air dried, and changed daily   looks comfortable currently on as needed Tylenol    Dysphagia  Multifactorial exacerbated by her cervical collar  Per family request they want to move go ahead with the PEG tube insertion  Gastroenterology have been consulted, PEG is planned for tomorrow    Left hip pain:  Will repeat Xrays      DVT Prophylaxis: SCD only for now given persistent hematuria have held off on the subcutaneous heparin  Diet: DIET TUBE FEED CONTINUOUS/CYCLIC NPO; STANDARD WITH FIBER (nepro); Nasogastric; Continuous; 20; (dietician to determine ); 23  Code Status: Limited    PT/OT Eval Status: Consulted    Dispo -Dubff placement. Cont inpatient care.      Ollie Gusman MD

## 2020-08-11 NOTE — PROGRESS NOTES
Patient alert, resting in the bed with family member at bedside. Tube feed Nepro running at 40 ml/hr. Patient tolerating. Tube feed needs to be stopped at midnight for PEG tube placement tomorrow afternoon around 12-1. Patients family members aware. VSS. No acute distress noted. Bed linens and gown changed. Patient tolerated well. Repositioned for comfort. Will continue to monitor patient.

## 2020-08-11 NOTE — PROGRESS NOTES
Palliative Care Chart Review  and Check in Note:     NAME:  Stephany Date: 8/3/2020  Hospital Day:  Hospital Day: 9   Current Code status: Limited    Palliative care is continuing to following Ms. Nanci Rodriguez for symptom management,  and goals of care discussion as needed. Patient's chart reviewed today 8/11/20. Called Marry Cardenas, and gave updates regarding course of treatment. Per Urology, cysto of unclear benefit at this time so will likely not be necessary based on current clinical picture. Answered questions regarding L hip pain, went over the imaging on 8/3, but per family pt now complaining of L hip pain, repeat XR ordered. GI to perform PEG tomorrow per discussion with hospitalist.Family would like PEG for nutritional needs, and hope that she can take PO for comfort in the future. We discussed comfort care and hospice and how family would have an easier time visiting pt if she were hospice, however, they were content with current plan to go to nursing facility with arrangements for visitations. They did say that they were likely to continue following hospice at the facility that she ends up going to. The following are the currently established goals/code status, and Symptom management. Goals of care:  1. Goals of Care/Advanced Care planning/Code status: DNR/DNI (Limitedx4)  2. Pain: Medicated as needed for c/o pain per the advanced dementia pain scale (Tylenol), L hip pain per family  3. SOB: Satting well on room air  4. AMS: likely 2/2 acute metabolic encephalopathy  5.  Dysphagia: 2/2 AMS and possibly some contribution of C1 fracture, unlikely to improve in the short term, GI on board for consideration for PEG  6. Disposition: TBD pending hospital course and ongoing family discussions regarding goals of care    Code status: Limitedx4 (DNR/DNI)    Discharge plan: TBD pending hospital course, less likely to need cysto, GI planning to place PEG tube tomorrow    Electronically signed

## 2020-08-11 NOTE — PROGRESS NOTES
Nephrology Note                                                                                                                                                                                                                                                                                                                                                               Office : [phone]     Fax :[phone]      Patient's Name: Ladonna Estrada  8:51 AM  8/11/2020    Reason for Consult:  Elevated Cr    History of Present Ilness:    Ladonna Estrada is a 80 y.o. female with PMH significant for carcinoma in situ of colon, pernicious anemia, and hypothyroidism. She presented to [facility] ED because she fell and was found on the floor. She is complaining of left hip and knee pain, as well as pain at the back of her head but she denies loss of consciousness. Denies chest pain, abdominal pain, and numbness or tingling. Denies being on blood thinner. According to her son, she ambulates a little bit with a walker, but mainly uses a wheelchair. He reports that she has been a little more confused in the last week. INTERVAL HISTORY    Feels same  Shortness of breath: No   UOP: Fair  Creat: trending down      Past Medical History:   Diagnosis Date    Carcinoma in situ of colon 1998    Diarrhea     Esophageal reflux     Other extrapyramidal disease and abnormal movement disorder     Pernicious anemia     S/P colonoscopy 4/09    OK - no further eval.    Unspecified cataract     Unspecified hypothyroidism     Unspecified pruritic disorder        Past Surgical History:   Procedure Laterality Date    COLECTOMY      partial    COLONOSCOPY  4/09    OK - no further eval    HYSTERECTOMY         No family history on file. reports that she has never smoked. She has never used smokeless tobacco. She reports that she does not drink alcohol or use drugs.     Allergies:  Bee venom    Current Medications: acetaminophen (TYLENOL) tablet 650 mg, Q6H PRN    Or  acetaminophen (TYLENOL) suppository 650 mg, Q6H PRN  [Held by provider] heparin (porcine) injection 5,000 Units, 3 times per day  levothyroxine (SYNTHROID) tablet 125 mcg, Daily  sodium chloride flush 0.9 % injection 10 mL, 2 times per day  sodium chloride flush 0.9 % injection 10 mL, PRN  polyethylene glycol (GLYCOLAX) packet 17 g, Daily PRN  promethazine (PHENERGAN) tablet 12.5 mg, Q6H PRN    Or  ondansetron (ZOFRAN) injection 4 mg, Q6H PRN  [Held by provider] magnesium sulfate 2 g in 50 mL IVPB premix, PRN  insulin lispro (1 Unit Dial) 0-6 Units, TID WC  insulin lispro (1 Unit Dial) 0-3 Units, Nightly  glucose (GLUTOSE) 40 % oral gel 15 g, PRN  dextrose 50 % IV solution, PRN  glucagon (rDNA) injection 1 mg, PRN  dextrose 5 % solution, PRN  perflutren lipid microspheres (DEFINITY) injection 1.65 mg, ONCE PRN        Physical exam:     Vitals:  /65   Pulse 72   Temp 97.1 °F (36.2 °C) (Axillary)   Resp 16   Ht 5' 2\" (1.575 m)   Wt 157 lb 6.5 oz (71.4 kg)   SpO2 97%   BMI 28.79 kg/m²   Constitutional:  resting, NAD  HEENT: DHT in place  Cardiovascular:  S1, S2 reg  Ext: no lower extremity edema  CNS:  no agitation   : Cha in place    Limited exam to preserve PPE    Data:   Labs:  CBC:   Recent Labs     08/09/20  0320 08/10/20  0535 08/11/20  0449   WBC 6.4 7.6 6.3   HGB 10.1* 9.5* 8.7*    153 157     BMP:    Recent Labs     08/09/20  0320 08/10/20  0535 08/11/20  0449    137 138   K 4.8  4.8 5.1  5.1 5.4*  5.4*    103 105   CO2 22 23 23   BUN 37* 39* 45*   CREATININE 1.6* 1.6* 1.5*   GLUCOSE 113* 125* 102*     Ca/Mg/Phos:   Recent Labs     08/09/20  0320 08/10/20  0535 08/11/20  0449   CALCIUM 8.2* 8.5 8.4   MG 1.70* 2.30 2.30   PHOS 2.8  --   --      Hepatic:   Recent Labs     08/09/20  0320 08/10/20  0535 08/11/20  0449   AST 24 24 22   ALT 12 12 11   BILITOT 0.3 0.3 0.3   ALKPHOS 74 76 78     Troponin:   No results for input(s): TROPONINI in the last 72 hours. BNP: No results for input(s): BNP in the last 72 hours. Lipids: No results for input(s): CHOL, TRIG, HDL, LDLCALC, LABVLDL in the last 72 hours. ABGs: No results for input(s): PHART, PO2ART, AJQ0BKT in the last 72 hours. INR:   No results for input(s): INR in the last 72 hours. UA:  No results for input(s): Heriberto Bidding, GLUCOSEU, BILIRUBINUR, KETUA, SPECGRAV, BLOODU, PHUR, PROTEINU, UROBILINOGEN, NITRU, LEUKOCYTESUR, Ramon Sportsman in the last 72 hours. Urine Microscopic:   No results for input(s): LABCAST, BACTERIA, COMU, HYALCAST, WBCUA, RBCUA, EPIU in the last 72 hours. Urine Culture:   No results for input(s): LABURIN in the last 72 hours. Urine Chemistry:   No results for input(s): Selena Mura, PROTEINUR, NAUR in the last 72 hours. IMAGING:  FL MODIFIED BARIUM SWALLOW W VIDEO   Final Result      Deep penetration/aspiration observed with essentially all substances examined as described above. Please see speech therapy report for further comments and recommendations. XR ABDOMEN (KUB) (SINGLE AP VIEW)   Final Result      NG tube advanced from the GE junction with tip overlying the expected position of the distal stomach. XR ABDOMEN (KUB) (SINGLE AP VIEW)   Final Result   Impression: Nasogastric tube is coiled within the distal esophagus. MRA NECK WO CONTRAST   Final Result   1. No evidence of acute infarct or other acute intracranial abnormality. 2. Moderate global volume loss with moderate chronic small vessel ischemic disease within the periventricular white matter. MRA NECK:      FINDINGS: The vertebral arteries are patent bilaterally and equal in size. No evidence of vertebral artery occlusion. Both vertebral arteries contribute to the basilar artery. The common carotid arteries are patent bilaterally. The internal and external carotid arteries are patent bilaterally. There is no high-grade stenosis.  There is no evidence of vessel occlusion. IMPRESSION:   1. Patent carotid and vertebral arteries. No evidence of acute dissection or occlusion. MRI BRAIN WO CONTRAST   Final Result   1. No evidence of acute infarct or other acute intracranial abnormality. 2. Moderate global volume loss with moderate chronic small vessel ischemic disease within the periventricular white matter. MRA NECK:      FINDINGS: The vertebral arteries are patent bilaterally and equal in size. No evidence of vertebral artery occlusion. Both vertebral arteries contribute to the basilar artery. The common carotid arteries are patent bilaterally. The internal and external carotid arteries are patent bilaterally. There is no high-grade stenosis. There is no evidence of vessel occlusion. IMPRESSION:   1. Patent carotid and vertebral arteries. No evidence of acute dissection or occlusion. CT ABDOMEN PELVIS WO CONTRAST Additional Contrast? None   Final Result      1. Mild bilateral hydronephrosis with ureters dilated to the level of the urinary bladder of uncertain etiology. 2. Collapsed urinary bladder with Cha catheter in place. There is suggestion of diffuse bladder wall thickening. Further evaluation with cystoscopy may be indicated. 3. Moderate age-indeterminate compression fracture of the L1 vertebral body. US RENAL COMPLETE   Final Result      Mild left hydronephrosis and mild right hydroureter. No ultrasound evidence of a renal calculus. If the patient is symptomatic for renal colic, need for further evaluation with CT abdomen can be determined clinically. Assessment/Plan   1. JUANCARLOS - secondary to obstruction/urinary retention/hypovolemia   -  Cr continuing to improve   - on TF   - K elevated - change TF to low K TF     2. Anion gap metabolic acidosis  - resolved    3. Hypophosphatemia  - supplemented    4. Acute metabolic encephalopathy  - likely secondary to infection       5.  Occipital condyle & C1 fracture  - follow neurosurgery recs      6. Bilateral hydronephrosis  -  likely secondary to urinary retention, status post Cha    7. Gross hematuria  - urology following,  - bladder irrigation  - cysto in future     8.  COVID 19 infection  - per hospitalist      Thank you for allowing us to participate in care of [address] [place] [address] free to contact me   [facility] [address]  Office : [phone]  Fax :[phone]

## 2020-08-11 NOTE — PROGRESS NOTES
Pt oriented to self. Pt is very hard of hearing. Pt had some facial grimacing and moaning when being turned or repositioned. Tylenol suppository given. Call light in hand bed at 30 degrees. Bed alarm on. CBI in place, newman draining with light pink output. Will continue to monitor.

## 2020-08-11 NOTE — PROGRESS NOTES
Speech Language Pathology  Dysphagia - contact note    Chart reviewed, d/w RN. RN states family leaning toward PEG placement. Rn requested SLP attempt to call [address] for cont education,  however there was no answer. SLP Tameka Corral attempted FAUSKE after MBS as well, therefore she called alternate Emi Underwood and explained results of MBS (see note 8/10 for full details). Will follow up with cont education as appropriate. Discussed with Mike Freeman NP from  palliative care as well. Naldo Barker M.S./[facility]-SLP #[id number]  Pg.  # [phone]

## 2020-08-11 NOTE — PROGRESS NOTES
Urology Attending Progress Note      Subjective: resting quietly. NAD    Vitals:  /65   Pulse 72   Temp 97.1 °F (36.2 °C) (Axillary)   Resp 16   Ht 5' 2\" (1.575 m)   Wt 157 lb 6.5 oz (71.4 kg)   SpO2 97%   BMI 28.79 kg/m²   Temp  Av.6 °F (36.4 °C)  Min: 97.1 °F (36.2 °C)  Max: 98.1 °F (36.7 °C)    Intake/Output Summary (Last 24 hours) at 2020 1001  Last data filed at 2020 0169  Gross per 24 hour   Intake 250 ml   Output 650 ml   Net -400 ml       Exam: newman draining clear urine off CBI    Labs:  WBC:    Lab Results   Component Value Date    WBC 6.3 2020     Hemoglobin/Hematocrit:    Lab Results   Component Value Date    HGB 8.7 2020    HCT 25.6 2020     BMP:    Lab Results   Component Value Date     2020    K 5.4 2020    K 5.4 2020     2020    CO2 23 2020    BUN 45 2020    LABALBU 3.2 2020    CREATININE 1.5 2020    CALCIUM 8.4 2020    GFRAA 39 2020    GFRAA >60 2012    LABGLOM 32 2020     PT/INR:    Lab Results   Component Value Date    PROTIME 11.8 2020    INR 1.02 2020     PTT:    Lab Results   Component Value Date    APTT 38.7 2020   [APTT            Antibiotic Therapy:  None     Imaging: no new       Impression/Plan:  81yo F COVID + admit with UTI, urinary retention and bilateral hydro with JUANCARLOS.  Has had persistent gross hematuria since cath insertion     -urine clear off CBI.     -Cr stable @ 1.5  -may consider cysto once medically stable     Amarilis Mejia, CELIA - CNP

## 2020-08-11 NOTE — CARE COORDINATION
CM following, spoke with Rodrigue Ferrer [phone], he wants Peg and Chick Larve, reached out to Brian Lozano to start precert for poss DC Friday or Sat.   Electronically signed by Chyna Gomez RN on 8/11/2020 at 3:47 PM  [phone]

## 2020-08-12 ENCOUNTER — ANESTHESIA EVENT (OUTPATIENT)
Dept: OPERATING ROOM | Age: 85
DRG: 668 | End: 2020-08-12
Payer: COMMERCIAL

## 2020-08-12 ENCOUNTER — ANESTHESIA (OUTPATIENT)
Dept: ENDOSCOPY | Age: 85
DRG: 668 | End: 2020-08-12
Payer: COMMERCIAL

## 2020-08-12 VITALS — DIASTOLIC BLOOD PRESSURE: 84 MMHG | SYSTOLIC BLOOD PRESSURE: 114 MMHG | OXYGEN SATURATION: 97 %

## 2020-08-12 LAB
A/G RATIO: 1 (ref 1.1–2.2)
ALBUMIN SERPL-MCNC: 2.8 G/DL (ref 3.4–5)
ALP BLD-CCNC: 65 U/L (ref 40–129)
ALT SERPL-CCNC: 11 U/L (ref 10–40)
ANION GAP SERPL CALCULATED.3IONS-SCNC: 9 MMOL/L (ref 3–16)
AST SERPL-CCNC: 23 U/L (ref 15–37)
BASOPHILS ABSOLUTE: 0 K/UL (ref 0–0.2)
BASOPHILS RELATIVE PERCENT: 0.9 %
BILIRUB SERPL-MCNC: 0.3 MG/DL (ref 0–1)
BUN BLDV-MCNC: 44 MG/DL (ref 7–20)
CALCIUM SERPL-MCNC: 8.5 MG/DL (ref 8.3–10.6)
CHLORIDE BLD-SCNC: 105 MMOL/L (ref 99–110)
CO2: 23 MMOL/L (ref 21–32)
CREAT SERPL-MCNC: 1.7 MG/DL (ref 0.6–1.2)
EOSINOPHILS ABSOLUTE: 0.3 K/UL (ref 0–0.6)
EOSINOPHILS RELATIVE PERCENT: 5.8 %
GFR AFRICAN AMERICAN: 34
GFR NON-AFRICAN AMERICAN: 28
GLOBULIN: 2.7 G/DL
GLUCOSE BLD-MCNC: 104 MG/DL (ref 70–99)
GLUCOSE BLD-MCNC: 112 MG/DL (ref 70–99)
GLUCOSE BLD-MCNC: 94 MG/DL (ref 70–99)
GLUCOSE BLD-MCNC: 98 MG/DL (ref 70–99)
HCT VFR BLD CALC: 24.6 % (ref 36–48)
HEMOGLOBIN: 8.3 G/DL (ref 12–16)
LYMPHOCYTES ABSOLUTE: 0.6 K/UL (ref 1–5.1)
LYMPHOCYTES RELATIVE PERCENT: 11 %
MCH RBC QN AUTO: 28.9 PG (ref 26–34)
MCHC RBC AUTO-ENTMCNC: 33.6 G/DL (ref 31–36)
MCV RBC AUTO: 85.9 FL (ref 80–100)
MONOCYTES ABSOLUTE: 0.6 K/UL (ref 0–1.3)
MONOCYTES RELATIVE PERCENT: 11.3 %
NEUTROPHILS ABSOLUTE: 3.7 K/UL (ref 1.7–7.7)
NEUTROPHILS RELATIVE PERCENT: 71 %
PDW BLD-RTO: 18.8 % (ref 12.4–15.4)
PERFORMED ON: ABNORMAL
PERFORMED ON: ABNORMAL
PERFORMED ON: NORMAL
PLATELET # BLD: 158 K/UL (ref 135–450)
PMV BLD AUTO: 8.1 FL (ref 5–10.5)
POTASSIUM REFLEX MAGNESIUM: 5 MMOL/L (ref 3.5–5.1)
RBC # BLD: 2.86 M/UL (ref 4–5.2)
SODIUM BLD-SCNC: 137 MMOL/L (ref 136–145)
TOTAL PROTEIN: 5.5 G/DL (ref 6.4–8.2)
WBC # BLD: 5.2 K/UL (ref 4–11)

## 2020-08-12 PROCEDURE — 3609013300 HC EGD TUBE PLACEMENT: Performed by: INTERNAL MEDICINE

## 2020-08-12 PROCEDURE — 7100000000 HC PACU RECOVERY - FIRST 15 MIN: Performed by: INTERNAL MEDICINE

## 2020-08-12 PROCEDURE — 6360000002 HC RX W HCPCS: Performed by: INTERNAL MEDICINE

## 2020-08-12 PROCEDURE — 2580000003 HC RX 258: Performed by: INTERNAL MEDICINE

## 2020-08-12 PROCEDURE — 2500000003 HC RX 250 WO HCPCS: Performed by: NURSE ANESTHETIST, CERTIFIED REGISTERED

## 2020-08-12 PROCEDURE — 2709999900 HC NON-CHARGEABLE SUPPLY: Performed by: INTERNAL MEDICINE

## 2020-08-12 PROCEDURE — 3700000000 HC ANESTHESIA ATTENDED CARE: Performed by: INTERNAL MEDICINE

## 2020-08-12 PROCEDURE — 85025 COMPLETE CBC W/AUTO DIFF WBC: CPT

## 2020-08-12 PROCEDURE — 3700000001 HC ADD 15 MINUTES (ANESTHESIA): Performed by: INTERNAL MEDICINE

## 2020-08-12 PROCEDURE — 1200000000 HC SEMI PRIVATE

## 2020-08-12 PROCEDURE — 7100000001 HC PACU RECOVERY - ADDTL 15 MIN: Performed by: INTERNAL MEDICINE

## 2020-08-12 PROCEDURE — 2580000003 HC RX 258: Performed by: NURSE ANESTHETIST, CERTIFIED REGISTERED

## 2020-08-12 PROCEDURE — 6370000000 HC RX 637 (ALT 250 FOR IP): Performed by: UROLOGY

## 2020-08-12 PROCEDURE — 6360000002 HC RX W HCPCS: Performed by: NURSE ANESTHETIST, CERTIFIED REGISTERED

## 2020-08-12 PROCEDURE — 80053 COMPREHEN METABOLIC PANEL: CPT

## 2020-08-12 PROCEDURE — 0DH63UZ INSERTION OF FEEDING DEVICE INTO STOMACH, PERCUTANEOUS APPROACH: ICD-10-PCS | Performed by: INTERNAL MEDICINE

## 2020-08-12 PROCEDURE — 36415 COLL VENOUS BLD VENIPUNCTURE: CPT

## 2020-08-12 PROCEDURE — 2700000000 HC OXYGEN THERAPY PER DAY

## 2020-08-12 RX ORDER — SODIUM CHLORIDE, SODIUM LACTATE, POTASSIUM CHLORIDE, CALCIUM CHLORIDE 600; 310; 30; 20 MG/100ML; MG/100ML; MG/100ML; MG/100ML
INJECTION, SOLUTION INTRAVENOUS CONTINUOUS PRN
Status: DISCONTINUED | OUTPATIENT
Start: 2020-08-12 | End: 2020-08-12 | Stop reason: SDUPTHER

## 2020-08-12 RX ORDER — PROPOFOL 10 MG/ML
INJECTION, EMULSION INTRAVENOUS PRN
Status: DISCONTINUED | OUTPATIENT
Start: 2020-08-12 | End: 2020-08-12 | Stop reason: SDUPTHER

## 2020-08-12 RX ORDER — ATROPA BELLADONNA AND OPIUM 16.2; 6 MG/1; MG/1
30 SUPPOSITORY RECTAL ONCE
Status: DISCONTINUED | OUTPATIENT
Start: 2020-08-12 | End: 2020-08-12

## 2020-08-12 RX ORDER — PROPOFOL 10 MG/ML
INJECTION, EMULSION INTRAVENOUS CONTINUOUS PRN
Status: DISCONTINUED | OUTPATIENT
Start: 2020-08-12 | End: 2020-08-12 | Stop reason: SDUPTHER

## 2020-08-12 RX ORDER — LIDOCAINE HYDROCHLORIDE 20 MG/ML
INJECTION, SOLUTION INTRAVENOUS PRN
Status: DISCONTINUED | OUTPATIENT
Start: 2020-08-12 | End: 2020-08-12 | Stop reason: SDUPTHER

## 2020-08-12 RX ORDER — KETAMINE HYDROCHLORIDE 50 MG/ML
INJECTION, SOLUTION, CONCENTRATE INTRAMUSCULAR; INTRAVENOUS PRN
Status: DISCONTINUED | OUTPATIENT
Start: 2020-08-12 | End: 2020-08-12 | Stop reason: SDUPTHER

## 2020-08-12 RX ADMIN — CEFAZOLIN 1 G: 1 INJECTION, POWDER, FOR SOLUTION INTRAMUSCULAR; INTRAVENOUS at 12:36

## 2020-08-12 RX ADMIN — PROPOFOL 20 MG: 10 INJECTION, EMULSION INTRAVENOUS at 12:31

## 2020-08-12 RX ADMIN — LIDOCAINE HYDROCHLORIDE 50 MG: 20 INJECTION, SOLUTION INTRAVENOUS at 12:32

## 2020-08-12 RX ADMIN — Medication 10 ML: at 08:04

## 2020-08-12 RX ADMIN — PROPOFOL 25 MCG/KG/MIN: 10 INJECTION, EMULSION INTRAVENOUS at 12:31

## 2020-08-12 RX ADMIN — HYDROMORPHONE HYDROCHLORIDE 0.5 MG: 1 INJECTION, SOLUTION INTRAMUSCULAR; INTRAVENOUS; SUBCUTANEOUS at 07:01

## 2020-08-12 RX ADMIN — SODIUM CHLORIDE, SODIUM LACTATE, POTASSIUM CHLORIDE, AND CALCIUM CHLORIDE: 600; 310; 30; 20 INJECTION, SOLUTION INTRAVENOUS at 12:51

## 2020-08-12 RX ADMIN — Medication 10 ML: at 21:15

## 2020-08-12 RX ADMIN — KETAMINE HYDROCHLORIDE 50 MG: 50 INJECTION, SOLUTION INTRAMUSCULAR; INTRAVENOUS at 12:32

## 2020-08-12 RX ADMIN — KETAMINE HYDROCHLORIDE 50 MG: 50 INJECTION, SOLUTION INTRAMUSCULAR; INTRAVENOUS at 12:30

## 2020-08-12 ASSESSMENT — PULMONARY FUNCTION TESTS
PIF_VALUE: 0

## 2020-08-12 ASSESSMENT — PAIN SCALES - GENERAL
PAINLEVEL_OUTOF10: 0
PAINLEVEL_OUTOF10: 0
PAINLEVEL_OUTOF10: 10
PAINLEVEL_OUTOF10: 0

## 2020-08-12 ASSESSMENT — PAIN SCALES - WONG BAKER
WONGBAKER_NUMERICALRESPONSE: 0
WONGBAKER_NUMERICALRESPONSE: 0

## 2020-08-12 NOTE — PROGRESS NOTES
Urology ordered suppository and Gold ordered Dilaudid 0.5mL. Pt was in a lot of pain and anxious about being turned and saying \"don't do anything. \" Dilaudid was then given and suppository was held. Pt now resting. CBI on high flow rate.

## 2020-08-12 NOTE — PROGRESS NOTES
Urology Attending Progress Note      Subjective: NAD. Awaiting PEG tube placement today  Vitals:  /73   Pulse 101   Temp 97.4 °F (36.3 °C) (Oral)   Resp 16   Ht 5' 2\" (1.575 m)   Wt 157 lb 6.5 oz (71.4 kg)   SpO2 94%   BMI 28.79 kg/m²   Temp  Av.6 °F (36.4 °C)  Min: 96.7 °F (35.9 °C)  Max: 98.2 °F (36.8 °C)    Intake/Output Summary (Last 24 hours) at 2020 1001  Last data filed at 2020 0650  Gross per 24 hour   Intake --   Output 4275 ml   Net -4275 ml       Exam: abd soft. Newman draining clear urine on minimal CBI    Labs:  WBC:    Lab Results   Component Value Date    WBC 5.2 2020     Hemoglobin/Hematocrit:    Lab Results   Component Value Date    HGB 8.3 2020    HCT 24.6 2020     BMP:    Lab Results   Component Value Date     2020    K 5.0 2020     2020    CO2 23 2020    BUN 44 2020    LABALBU 2.8 2020    CREATININE 1.7 2020    CALCIUM 8.5 2020    GFRAA 34 2020    GFRAA >60 2012    LABGLOM 28 2020     PT/INR:    Lab Results   Component Value Date    PROTIME 12.8 2020    INR 1.10 2020     PTT:    Lab Results   Component Value Date    APTT 38.7 2020   [APTT        Antibiotic Therapy:    Ancef    Imaging: CT abd/pelvis 8/3/20  1. Mild bilateral hydronephrosis with ureters dilated to the level of the urinary bladder of uncertain etiology. 2. Collapsed urinary bladder with Newman catheter in place. There is suggestion of diffuse bladder wall thickening. Further evaluation with cystoscopy may be indicated. 3. Moderate age-indeterminate compression fracture of the L1 vertebral body. Impression/Plan: 81yo F COVID + (now negative) admitted with UTI, urinary retention and bilateral hydro with JUANCARLOS.  Has had persistent gross hematuria since cath insertion     -newman clotted off overnight. Irrigated multiple times for small clots.  Urine now clear on minimal CBI  -Cr @

## 2020-08-12 NOTE — PROGRESS NOTES
dextrose, glucagon (rDNA), dextrose, perflutren lipid microspheres      Intake/Output Summary (Last 24 hours) at 8/12/2020 1115  Last data filed at 8/12/2020 1044  Gross per 24 hour   Intake --   Output 4775 ml   Net -4775 ml       Physical Exam Performed:    /74   Pulse 100   Temp 97.6 °F (36.4 °C) (Axillary)   Resp 16   Ht 5' 2\" (1.575 m)   Wt 157 lb 6.5 oz (71.4 kg)   SpO2 94%   BMI 28.79 kg/m²     General appearance: Wakes up to command but not able to answer any questions as she is very hard of hearing  HEENT: Pupils equal, round, and reactive to light. Conjunctivae/corneas clear. Neck: [place]  J collar present  Respiratory:  Normal respiratory effort. Clear to auscultation, bilaterally without Rales/Wheezes/Rhonchi. Cardiovascular: Regular rate and rhythm with normal S1/S2 without murmurs, rubs or gallops. Abdomen: Soft, non-tender, non-distended with normal bowel sounds. Musculoskeletal: No clubbing, cyanosis or edema bilaterally. Pain with passive ROM hip  Skin: Skin color, texture, turgor normal.    Neurologic:  Neurovascularly intact without any focal sensory/motor deficits. Cranial nerves: II-XII intact, grossly non-focal.          Labs:   Recent Labs     08/10/20  0535 08/11/20  0449 08/12/20  0449   WBC 7.6 6.3 5.2   HGB 9.5* 8.7* 8.3*   HCT 28.4* 25.6* 24.6*    157 158     Recent Labs     08/10/20  0535 08/11/20  0449 08/12/20  0449    138 137   K 5.1  5.1 5.4*  5.4* 5.0    105 105   CO2 23 23 23   BUN 39* 45* 44*   CREATININE 1.6* 1.5* 1.7*   CALCIUM 8.5 8.4 8.5     Recent Labs     08/10/20  0535 08/11/20  0449 08/12/20  0449   AST 24 22 23   ALT 12 11 11   BILITOT 0.3 0.3 0.3   ALKPHOS 76 78 65     Recent Labs     08/11/20  1525   INR 1.10     No results for input(s): CKTOTAL, TROPONINI in the last 72 hours.     Urinalysis:      Lab Results   Component Value Date    NITRU Negative 08/03/2020    WBCUA 335 08/03/2020    BACTERIA Rare 01/07/2020    RBCUA 77 08/03/2020    BLOODU LARGE 08/03/2020    SPECGRAV 1.013 08/03/2020    GLUCOSEU Negative 08/03/2020    GLUCOSEU NEGATIVE 05/17/2010       Radiology:  XR HIP 2-3 VW W PELVIS LEFT   Final Result      No evidence of acute osseous abnormality. If the patient is nonweightbearing or there is further clinical suspicion for hip fracture, CT would be more sensitive. FL MODIFIED BARIUM SWALLOW W VIDEO   Final Result      Deep penetration/aspiration observed with essentially all substances examined as described above. Please see speech therapy report for further comments and recommendations. XR ABDOMEN (KUB) (SINGLE AP VIEW)   Final Result      NG tube advanced from the GE junction with tip overlying the expected position of the distal stomach. XR ABDOMEN (KUB) (SINGLE AP VIEW)   Final Result   Impression: Nasogastric tube is coiled within the distal esophagus. MRA NECK WO CONTRAST   Final Result   1. No evidence of acute infarct or other acute intracranial abnormality. 2. Moderate global volume loss with moderate chronic small vessel ischemic disease within the periventricular white matter. MRA NECK:      FINDINGS: The vertebral arteries are patent bilaterally and equal in size. No evidence of vertebral artery occlusion. Both vertebral arteries contribute to the basilar artery. The common carotid arteries are patent bilaterally. The internal and external carotid arteries are patent bilaterally. There is no high-grade stenosis. There is no evidence of vessel occlusion. IMPRESSION:   1. Patent carotid and vertebral arteries. No evidence of acute dissection or occlusion. MRI BRAIN WO CONTRAST   Final Result   1. No evidence of acute infarct or other acute intracranial abnormality. 2. Moderate global volume loss with moderate chronic small vessel ischemic disease within the periventricular white matter.       MRA NECK:      FINDINGS: The vertebral arteries are patent bilaterally and equal in size. No evidence of vertebral artery occlusion. Both vertebral arteries contribute to the basilar artery. The common carotid arteries are patent bilaterally. The internal and external carotid arteries are patent bilaterally. There is no high-grade stenosis. There is no evidence of vessel occlusion. IMPRESSION:   1. Patent carotid and vertebral arteries. No evidence of acute dissection or occlusion. CT ABDOMEN PELVIS WO CONTRAST Additional Contrast? None   Final Result      1. Mild bilateral hydronephrosis with ureters dilated to the level of the urinary bladder of uncertain etiology. 2. Collapsed urinary bladder with Cha catheter in place. There is suggestion of diffuse bladder wall thickening. Further evaluation with cystoscopy may be indicated. 3. Moderate age-indeterminate compression fracture of the L1 vertebral body. US RENAL COMPLETE   Final Result      Mild left hydronephrosis and mild right hydroureter. No ultrasound evidence of a renal calculus. If the patient is symptomatic for renal colic, need for further evaluation with CT abdomen can be determined clinically. Assessment/Plan:    Active Hospital Problems    Diagnosis Date Noted    COVID-19 [U07.1] 08/04/2020    Acute metabolic encephalopathy [A89.18] 08/03/2020       80year-old lady admitted to the hospital after a fall    COVID-19 pneumonia  Will check inflammatory markers CRP, ferritin, d-dimer  Patient is noted respiratory distress. Will hold off any dexamethasone. ID signed off  Patient tested negative twice and out of precautions. Acute metabolic encephalopathy:   improved and interactive with family appropriately   Multifactorial UTI, renal failure    JUANCARLOS multifactorial prerenal and postrenal  PVR was reported to be 900 cc  Renal ultrasound mild left hydronephrosis and mild right hydroureter.     Seen by urology  Currently on CBI  Hematuria has cleared up  Creatinine is stable  Nephrology is following        UTI with Klebsiella  Completed 6 day course of IV abx     Hematuria of unclear etiology. Continue CBI per urology  Hematuria has cleared up  Discussed with urology the plan is to do cystoscopy tomorrow      Elevated troponin in setting of JUANCARLOS. Troponin trending down. Echocardiogram shows ejection fraction of 40 to 45% with moderate to severe aortic stenosis    New onset mild systolic heart failure with moderate to severe aortic stenosis  Currently seems euvolemic      Fall unsure mechanical versus other reason. Follow-up on echocardiogram.  MRA and MRI reviewed no aneurysm or stroke. Traumatic comminuted and displaced left occipital condyle and C1 left lateral mass fracture  Neurosurgery consult reviewed and appreciated  [place] J collar to be worn at all times  Cervical collar does NOT need to be worn when eating, bathing or showering  Cervical collar pads to be cleaned with soap & water, air dried, and changed daily   looks comfortable currently on as needed Tylenol    Dysphagia  Multifactorial exacerbated by her cervical collar  Per family request they want to move go ahead with the PEG tube insertion  Gastroenterology have been consulted, PEG is planned for today    Left hip pain:  Stress did not show any fractures      DVT Prophylaxis: SCD only for now given persistent hematuria have held off on the subcutaneous heparin  Diet: Dietary Nutrition Supplements: Protein Modular  Diet NPO, After Midnight Exceptions are: Other (See Comment)  Code Status: Limited    PT/OT Eval Status: Consulted    Dispo -[facility] placement. Cont inpatient care.      Pia Gibson MD

## 2020-08-12 NOTE — PLAN OF CARE
Nutrition Problem #1: Inadequate oral intake  Intervention: Food and/or Nutrient Delivery: Continue NPO, Continue Current Tube Feeding  Nutritional Goals: Patient to tolerate EN to meet 100% of nutrition needs

## 2020-08-12 NOTE — OP NOTE
Patient:   Juan Daniel Madrigal   :    3/12/1922   Facility:   [facility]  Procedure:   Esophagogastroduodenoscopy with placement of a percutaneous endoscopic gastrostomy tube  Date:     2020   Endoscopist:  Eli Lux     Preoperative Diagnosis:  Feeding Difficulties    Postoperative Diagnosis:  Feeding Difficulties    Preprocedure medications: One gram of Cefazolin was given. immediately prior to the procedure. ASA:   Mallampati:    Anesthesia:  MAC    Estimated blood loss: Minimal    Complications: None    Description of Procedure:  Informed consent was obtained  after explanation of the procedure including indications, description of the procedure,  benefits and possible risks and complications of the procedure, and alternatives. Questions were answered. The patient's history was reviewed and a directed physical examination was performed prior to the procedure. Patient recieved prophylactic antibiotics prior to the start of the procedure and was monitored throughout the procedure with pulse oximetry and periodic assessment of vital signs. Patient was sedated as noted above. With the patient in the semi-Hinojosa's position, Olympus flexible endoscope was introduced and passed without difficulty. Visualization of the esophagus, stomach, and duodenum was performed and retroflexed view of the proximal stomach was obtained. The scope was passed to the 2nd portion of the duodenum. No contraindication to placement of the gastrostomy tube was appreciated. The site for placement of the gastrostomy tube was identified with palpation and transillumination of the abdomen. The abdomen, having been prepared, was draped in a sterile fashion. Local anesthesia was provided with  1% Xylocaine. A nick was made in the skin with a #11 scalpel blade. Angiocath was introduced through the anterior abdominal wall. This was visualized internally with endoscope.  The needle was withdrawn and an insertion wire introduced. This was grasped with a snare and withdrawn through the patient's mouth with withdrawal of the endoscope. A 20-Greek Ponsky-type gastrostomy tube was affixed to the wire and traction applied to the later. The tube was delivered through the anterior abdominal wall and a bolster placed at 2 cm. Dry sterile dressing was applied and an abdominal binder was not placed. The patient tolerated the procedure well. Findings:  -Successful PEG tube placement. Recommendations: -The PEG tube may be used for feedings as soon as bowel sounds are confirmed post-procedure. The head of the bed should be kept elevated to 30 degrees during tube feeds, and the tube should be flushed with 30 mL of water every 8 hours and after giving medications.       Ardine Brunner, MD

## 2020-08-12 NOTE — H&P
History and Physical / Pre-Sedation Assessment      PMHx:   Past Medical History:   Diagnosis Date    Carcinoma in situ of colon 1998    Diarrhea     Esophageal reflux     Other extrapyramidal disease and abnormal movement disorder     Pernicious anemia     S/P colonoscopy 4/09    OK - no further eval.    Unspecified cataract     Unspecified hypothyroidism     Unspecified pruritic disorder        Medications:   Prior to Admission medications    Medication Sig Start Date End Date Taking? Authorizing Provider   FLUZONE QUADRIVALENT 0.5 ML injection TO BE ADMINISTERED BY PHARMACIST FOR IMMUNIZATION 10/30/18   Historical Provider, MD   triamcinolone (KENALOG) 0.1 % cream Apply topically 2 times daily.  10/19/18   Saroj Mckeon MD   potassium chloride (KLOR-CON M) 20 MEQ extended release tablet Take 0.5 tablets by mouth daily 10/19/18   Saroj Kaye MD   latanoprost (XALATAN) 0.005 % ophthalmic solution Place 1 drop into both eyes nightly    Historical Provider, MD   lisinopril (PRINIVIL;ZESTRIL) 2.5 MG tablet Take 2.5 mg by mouth daily    Historical Provider, MD   metoprolol tartrate (LOPRESSOR) 25 MG tablet Take 25 mg by mouth 2 times daily    Historical Provider, MD   calcium elemental (OYSCO 500) 500 MG TABS tablet Take 500 mg by mouth 2 times daily     Historical Provider, MD   solifenacin (VESICARE) 5 MG tablet Take 5 mg by mouth daily    Historical Provider, MD   vitamin D (CHOLECALCIFEROL) 1000 UNIT TABS tablet Take 2,000 Units by mouth daily    Historical Provider, MD   hydrALAZINE (APRESOLINE) 10 MG tablet Take 10 mg by mouth 2 times daily as needed    Historical Provider, MD   polyethylene glycol (GLYCOLAX) powder Take 17 g by mouth 2 times daily as needed    Historical Provider, MD   senna-docusate (PERICOLACE) 8.6-50 MG per tablet Take 1 tablet by mouth 2 times daily as needed for Constipation    Historical Provider, MD   loratadine (CLARITIN) 10 MG tablet Take 10 mg by mouth daily Historical Provider, MD   aspirin 325 MG EC tablet Take 1 tablet by mouth daily 10/17/18 11/16/18  CELIA Coy CNP   dorzolamide-timolol (COSOPT) 22.3-6.8 MG/ML ophthalmic solution Place 1 drop into both eyes every 12 hours    Historical Provider, MD   Flax OIL Take by mouth daily    Historical Provider, MD   acetaminophen (AMINOFEN) 325 MG tablet Take 2 tablets by mouth every 6 hours as needed for Pain  Patient taking differently: Take 650 mg by mouth 2 times daily  4/18/16   CELIA Khan CNP   ranitidine (ZANTAC) 150 MG tablet TAKE ONE TABLET BY MOUTH TWICE A DAY 1/26/16   Vonnie Zuleta MD   levothyroxine (SYNTHROID) 125 MCG tablet TAKE ONE TABLET BY MOUTH DAILY 1/26/16   Vonnie Zuleta MD   cyanocobalamin 1000 MCG/ML injection INJECT 1 ML ONCE EVERY MONTH 12/7/15   Vonnie Zuleta MD   Compression Stockings MISC by Does not apply route 20-30 mmHg -- knee high 9/28/15   Vonnie Zuleta MD   SYRINGE-NEEDLE, DISP, 3 ML (B-D 3CC LUER-ENEDINA SYR 25GX5/8\") 25G X 5/8\" 3 ML MISC 1 mL by Alternating Nares route every 30 days 8/14/15   Vonnie Zuleta MD   Needles & Syringes MISC by Does not apply route. Historical Provider, MD       Allergies: Allergies   Allergen Reactions    Bee Venom        PSHx:   Past Surgical History:   Procedure Laterality Date    COLECTOMY      partial    COLONOSCOPY  4/09    OK - no further eval    HYSTERECTOMY         Social Hx:   Social History     Socioeconomic History    Marital status:       Spouse name: Not on file    Number of children: Not on file    Years of education: Not on file    Highest education level: Not on file   Occupational History    Not on file   Social Needs    Financial resource strain: Not on file    Food insecurity     Worry: Not on file     Inability: Not on file    Transportation needs     Medical: Not on file     Non-medical: Not on file   Tobacco Use    Smoking status: Never Smoker    Smokeless tobacco: Never Used Substance and Sexual Activity    Alcohol use: No    Drug use: No    Sexual activity: Not on file   Lifestyle    Physical activity     Days per week: Not on file     Minutes per session: Not on file    Stress: Not on file   Relationships    Social connections     Talks on phone: Not on file     Gets together: Not on file     Attends Sabianist service: Not on file     Active member of club or organization: Not on file     Attends meetings of clubs or organizations: Not on file     Relationship status: Not on file    Intimate partner violence     Fear of current or ex partner: Not on file     Emotionally abused: Not on file     Physically abused: Not on file     Forced sexual activity: Not on file   Other Topics Concern    Not on file   Social History Narrative    Not on file       Family Hx:   No family history on file. Physical Exam:  Vital Signs: /74   Pulse 100   Temp 97.6 °F (36.4 °C) (Axillary)   Resp 16   Ht 5' 2\" (1.575 m)   Wt 157 lb 6.5 oz (71.4 kg)   SpO2 94%   BMI 28.79 kg/m²    Airway: Mallampati: III (soft palate, base of uvula visible)  Pulmonary:Normal  Cardiac:Normal  Abdomen:Normal    Pre-Procedure Assessment / Plan:  ASA: Class 4 - A patient with an incapacitating systemic disease that is a constant threat to life  Level of Sedation Plan:Deep sedation  Post Procedure plan: Return to same level of care    I assessed the patient and find that the patient is in satisfactory condition to proceed with the planned procedure and sedation plan. I have explained the risk, benefits, and alternatives to the procedure; the patient and the patient's  and son understands and agrees to proceed.        Roxy Yang  8/12/2020

## 2020-08-12 NOTE — PROGRESS NOTES
Patient resting in bed. Call light in patient hand, fall precautions in place. Bed in lowest position. Patient POA in the room for update. Endoscopy nurse received consent on the phone for PEG tube placement that is scheduled for today. Patient VSS, no acute signs of distress noted. Will continue to monitor.

## 2020-08-12 NOTE — PROGRESS NOTES
Nephrology Note                                                                                                                                                                                                                                                                                                                                                               Office : [phone]     Fax :[phone]      Patient's Name: Chris John  10:17 AM  8/12/2020    Reason for Consult:  Elevated Cr    History of Present Ilness:    Chris John is a 80 y.o. female with PMH significant for carcinoma in situ of colon, pernicious anemia, and hypothyroidism. She presented to [facility] ED because she fell and was found on the floor. She is complaining of left hip and knee pain, as well as pain at the back of her head but she denies loss of consciousness. Denies chest pain, abdominal pain, and numbness or tingling. Denies being on blood thinner. According to her son, she ambulates a little bit with a walker, but mainly uses a wheelchair. He reports that she has been a little more confused in the last week. INTERVAL HISTORY    Feels same  Shortness of breath: No   UOP: Fair  Creat: stable   K better     Past Medical History:   Diagnosis Date    Carcinoma in situ of colon 1998    Diarrhea     Esophageal reflux     Other extrapyramidal disease and abnormal movement disorder     Pernicious anemia     S/P colonoscopy 4/09    OK - no further eval.    Unspecified cataract     Unspecified hypothyroidism     Unspecified pruritic disorder        Past Surgical History:   Procedure Laterality Date    COLECTOMY      partial    COLONOSCOPY  4/09    OK - no further eval    HYSTERECTOMY         No family history on file. reports that she has never smoked. She has never used smokeless tobacco. She reports that she does not drink alcohol or use drugs.     Allergies:  Bee venom    Current Medications: opium-belladonna (B&O SUPPRETTES) 16.2-60 MG suppository 30 mg, Once  ceFAZolin (ANCEF) 1 g in dextrose 5 % 50 mL IVPB (mini-bag), Once  acetaminophen (TYLENOL) tablet 650 mg, Q6H PRN    Or  acetaminophen (TYLENOL) suppository 650 mg, Q6H PRN  [Held by provider] heparin (porcine) injection 5,000 Units, 3 times per day  levothyroxine (SYNTHROID) tablet 125 mcg, Daily  sodium chloride flush 0.9 % injection 10 mL, 2 times per day  sodium chloride flush 0.9 % injection 10 mL, PRN  polyethylene glycol (GLYCOLAX) packet 17 g, Daily PRN  promethazine (PHENERGAN) tablet 12.5 mg, Q6H PRN    Or  ondansetron (ZOFRAN) injection 4 mg, Q6H PRN  [Held by provider] magnesium sulfate 2 g in 50 mL IVPB premix, PRN  insulin lispro (1 Unit Dial) 0-6 Units, TID WC  insulin lispro (1 Unit Dial) 0-3 Units, Nightly  glucose (GLUTOSE) 40 % oral gel 15 g, PRN  dextrose 50 % IV solution, PRN  glucagon (rDNA) injection 1 mg, PRN  dextrose 5 % solution, PRN  perflutren lipid microspheres (DEFINITY) injection 1.65 mg, ONCE PRN        Physical exam:     Vitals:  /73   Pulse 101   Temp 97.4 °F (36.3 °C) (Oral)   Resp 16   Ht 5' 2\" (1.575 m)   Wt 157 lb 6.5 oz (71.4 kg)   SpO2 94%   BMI 28.79 kg/m²   Constitutional:  resting, NAD  HEENT: DHT in place  Cardiovascular:  S1, S2 reg  Ext: no lower extremity edema  CNS:  no agitation   : Cha in place    Limited exam to preserve PPE    Data:   Labs:  CBC:   Recent Labs     08/10/20  0535 08/11/20  0449 08/12/20  0449   WBC 7.6 6.3 5.2   HGB 9.5* 8.7* 8.3*    157 158     BMP:    Recent Labs     08/10/20  0535 08/11/20  0449 08/12/20  0449    138 137   K 5.1  5.1 5.4*  5.4* 5.0    105 105   CO2 23 23 23   BUN 39* 45* 44*   CREATININE 1.6* 1.5* 1.7*   GLUCOSE 125* 102* 94     Ca/Mg/Phos:   Recent Labs     08/10/20  0535 08/11/20 0449 08/12/20 0449   CALCIUM 8.5 8.4 8.5   MG 2.30 2.30  --      Hepatic:   Recent Labs     08/10/20  0535 08/11/20 0449 08/12/20 0449 AST 24 22 23   ALT 12 11 11   BILITOT 0.3 0.3 0.3   ALKPHOS 76 78 65     Troponin:   No results for input(s): TROPONINI in the last 72 hours. BNP: No results for input(s): BNP in the last 72 hours. Lipids: No results for input(s): CHOL, TRIG, HDL, LDLCALC, LABVLDL in the last 72 hours. ABGs: No results for input(s): PHART, PO2ART, DHK8HHY in the last 72 hours. INR:   Recent Labs     08/11/20  1525   INR 1.10     UA:  No results for input(s): COLORU, CLARITYU, GLUCOSEU, BILIRUBINUR, KETUA, SPECGRAV, BLOODU, PHUR, PROTEINU, UROBILINOGEN, NITRU, LEUKOCYTESUR, Kaushik Candi in the last 72 hours. Urine Microscopic:   No results for input(s): LABCAST, BACTERIA, COMU, HYALCAST, WBCUA, RBCUA, EPIU in the last 72 hours. Urine Culture:   No results for input(s): LABURIN in the last 72 hours. Urine Chemistry:   No results for input(s): Myrtice Seats, PROTEINUR, NAUR in the last 72 hours. IMAGING:  XR HIP 2-3 VW W PELVIS LEFT   Final Result      No evidence of acute osseous abnormality. If the patient is nonweightbearing or there is further clinical suspicion for hip fracture, CT would be more sensitive. FL MODIFIED BARIUM SWALLOW W VIDEO   Final Result      Deep penetration/aspiration observed with essentially all substances examined as described above. Please see speech therapy report for further comments and recommendations. XR ABDOMEN (KUB) (SINGLE AP VIEW)   Final Result      NG tube advanced from the GE junction with tip overlying the expected position of the distal stomach. XR ABDOMEN (KUB) (SINGLE AP VIEW)   Final Result   Impression: Nasogastric tube is coiled within the distal esophagus. MRA NECK WO CONTRAST   Final Result   1. No evidence of acute infarct or other acute intracranial abnormality. 2. Moderate global volume loss with moderate chronic small vessel ischemic disease within the periventricular white matter.       MRA NECK:      FINDINGS: The Assessment/Plan   1. JUANCARLOS - secondary to obstruction/urinary retention/hypovolemia   -  Cr continuing to improve   - on TF   - K was elevated - changed TF to low K TF     2. Anion gap metabolic acidosis  - resolved    3. Hypophosphatemia  - supplemented    4. Acute metabolic encephalopathy  - likely secondary to infection       5. Occipital condyle & C1 fracture  - follow neurosurgery recs      6. Bilateral hydronephrosis  -  likely secondary to urinary retention, status post Cha    7. Gross hematuria  - urology following,  - bladder irrigation  - cysto in future     8.  COVID 19 infection  - per hospitalist      Thank you for allowing us to participate in care of [address] [place] [address] free to contact me   [facility] [address]  Office : [phone]  Fax :[phone]

## 2020-08-12 NOTE — PROGRESS NOTES
PACU Transfer Note    Vitals:    08/12/20 1320   BP: 132/69   Pulse: 90   Resp: (!) 31   Temp: 97.3 °F (36.3 °C)   SpO2: 97%       In: 50 [I.V.:50]  Out: 1200 [Urine:1200]    Pain assessment:  Patient not interactive. Pain Level: 0    Report given to Receiving unit RN. [facility] RN    8/12/2020 1:29 PM

## 2020-08-12 NOTE — PROGRESS NOTES
Speech Language Pathology  DYSPHAGIA - hold    Chart reviewed, noted pt having PEG placed today. Will follow up as pt appropriate and schedule allows      Pearl Elias M.S./CCC-SLP #[id number]  Pg.  # [phone]

## 2020-08-12 NOTE — CARE COORDINATION
CM following, pt went for peg tube today, will go for Cysto 8/13/20, spoke with Ashley Nugent at [facility] couldn't start precert until peg was placed so precert started today, for poss DC Friday vs Sat pending pt medical stability. Will need COVID with in 5 days of DC.   Electronically signed by Wendy Chua RN on 8/12/2020 at 4:30 PM  [phone]

## 2020-08-12 NOTE — PROGRESS NOTES
Patient arrived from  to PACU spot 10. Attached to monitoring system. Report received perCRNA. Patient given small dose of Ketamine for procedure. Patient awake upon arrival to [facility]. Suctioned for small amount of oral secretions. No problems reported during procedure. Status stable at this time.

## 2020-08-12 NOTE — PLAN OF CARE
Problem: Nutrition Deficits  Goal: Optimize nutrtional status  Outcome: Ongoing   Pt on TF, stopped at midnight. Problem: Skin Integrity:  Goal: Will show no infection signs and symptoms  Description: Will show no infection signs and symptoms  Outcome: Ongoing   Pt on specialty mattress, no new skin issue noticed during this shift. Pt repositioned. Will continue to monitor.

## 2020-08-12 NOTE — ANESTHESIA PRE PROCEDURE
Department of Anesthesiology  Preprocedure Note       Name:  Dario Hinton   Age:  80 y.o.  :  3/12/1922                                          MRN:  [id number]         Date:  2020      Surgeon: Caden Rubio):  Jarrett Lopes MD    Procedure: Procedure(s):  EGD ESOPHAGOGASTRODUODENOSCOPY PEG TUBE INSERTION    Medications prior to admission:   Prior to Admission medications    Medication Sig Start Date End Date Taking? Authorizing Provider   FLUZONE QUADRIVALENT 0.5 ML injection TO BE ADMINISTERED BY PHARMACIST FOR IMMUNIZATION 10/30/18   Historical Provider, MD   triamcinolone (KENALOG) 0.1 % cream Apply topically 2 times daily.  10/19/18   Saroj Patel MD   potassium chloride (KLOR-CON M) 20 MEQ extended release tablet Take 0.5 tablets by mouth daily 10/19/18   Saroj Patel MD   latanoprost (XALATAN) 0.005 % ophthalmic solution Place 1 drop into both eyes nightly    Historical Provider, MD   lisinopril (PRINIVIL;ZESTRIL) 2.5 MG tablet Take 2.5 mg by mouth daily    Historical Provider, MD   metoprolol tartrate (LOPRESSOR) 25 MG tablet Take 25 mg by mouth 2 times daily    Historical Provider, MD   calcium elemental (OYSCO 500) 500 MG TABS tablet Take 500 mg by mouth 2 times daily     Historical Provider, MD   solifenacin (VESICARE) 5 MG tablet Take 5 mg by mouth daily    Historical Provider, MD   vitamin D (CHOLECALCIFEROL) 1000 UNIT TABS tablet Take 2,000 Units by mouth daily    Historical Provider, MD   hydrALAZINE (APRESOLINE) 10 MG tablet Take 10 mg by mouth 2 times daily as needed    Historical Provider, MD   polyethylene glycol (GLYCOLAX) powder Take 17 g by mouth 2 times daily as needed    Historical Provider, MD   senna-docusate (PERICOLACE) 8.6-50 MG per tablet Take 1 tablet by mouth 2 times daily as needed for Constipation    Historical Provider, MD   loratadine (CLARITIN) 10 MG tablet Take 10 mg by mouth daily    Historical Provider, MD   aspirin 325 MG EC tablet Take 1 tablet by mouth daily 10/17/18 11/16/18  CELIA Whittington CNP   dorzolamide-timolol (COSOPT) 22.3-6.8 MG/ML ophthalmic solution Place 1 drop into both eyes every 12 hours    Historical Provider, MD   Flax OIL Take by mouth daily    Historical Provider, MD   acetaminophen (AMINOFEN) 325 MG tablet Take 2 tablets by mouth every 6 hours as needed for Pain  Patient taking differently: Take 650 mg by mouth 2 times daily  4/18/16   CELIA Parks CNP   ranitidine (ZANTAC) 150 MG tablet TAKE ONE TABLET BY MOUTH TWICE A DAY 1/26/16   Destinee Buitrago MD   levothyroxine (SYNTHROID) 125 MCG tablet TAKE ONE TABLET BY MOUTH DAILY 1/26/16   Destinee Buitrago MD   cyanocobalamin 1000 MCG/ML injection INJECT 1 ML ONCE EVERY MONTH 12/7/15   Destinee Buitrago MD   Compression Stockings MISC by Does not apply route 20-30 mmHg -- knee high 9/28/15   Destinee Buitrago MD   SYRINGE-NEEDLE, DISP, 3 ML (B-D 3CC LUER-ENEDINA SYR 25GX5/8\") 25G X 5/8\" 3 ML MISC 1 mL by Alternating Nares route every 30 days 8/14/15   Destinee Buitrago MD   Needles & Syringes MISC by Does not apply route.     Historical Provider, MD       Current medications:    Current Facility-Administered Medications   Medication Dose Route Frequency Provider Last Rate Last Dose    ceFAZolin (ANCEF) 1 g in dextrose 5 % 50 mL IVPB (mini-bag)  1 g Intravenous Once Charmayne Sicilian, MD        acetaminophen (TYLENOL) tablet 650 mg  650 mg Oral Q6H PRN Song Hunt MD   650 mg at 08/08/20 1103    Or    acetaminophen (TYLENOL) suppository 650 mg  650 mg Rectal Q6H PRN Song Hunt MD   650 mg at 08/11/20 0423    [Held by provider] heparin (porcine) injection 5,000 Units  5,000 Units Subcutaneous 3 times per day Song Hunt MD   5,000 Units at 08/09/20 0626    levothyroxine (SYNTHROID) tablet 125 mcg  125 mcg Oral Daily Song Hunt MD   125 mcg at 08/10/20 0826    sodium chloride flush 0.9 % injection 10 mL  10 mL Intravenous 2 times per day Song Hunt MD   10 mL at 08/12/20 0804    sodium chloride flush 0.9 % injection 10 mL  10 mL Intravenous PRN Kisha Coon MD   10 mL at 08/04/20 0514    polyethylene glycol (GLYCOLAX) packet 17 g  17 g Oral Daily PRN Kisha Coon MD        promethazine (PHENERGAN) tablet 12.5 mg  12.5 mg Oral Q6H PRN Kisha Coon MD        Or    [facility]) injection 4 mg  4 mg Intravenous Q6H PRN Kisha Coon MD   4 mg at 08/05/20 0057    [Held by provider] magnesium sulfate 2 g in 50 mL IVPB premix  2 g Intravenous PRN Kisha Coon MD        insulin lispro (1 Unit Dial) 0-6 Units  0-6 Units Subcutaneous TID WC Kisha Coon MD   Stopped at 08/10/20 1223    insulin lispro (1 Unit Dial) 0-3 Units  0-3 Units Subcutaneous Nightly Kisha Coon MD   1 Units at 08/04/20 2155    glucose (GLUTOSE) 40 % oral gel 15 g  15 g Oral PRN Kisha Coon MD        dextrose 50 % IV solution  12.5 g Intravenous PRN Kisha Coon MD   12.5 g at 08/03/20 0930    glucagon (rDNA) injection 1 mg  1 mg Intramuscular PRN Kisha Coon MD        dextrose 5 % solution  100 mL/hr Intravenous PRN Kisha Coon MD        perflutren lipid microspheres (DEFINITY) injection 1.65 mg  1.5 mL Intravenous ONCE PRN Kisha Coon MD         Facility-Administered Medications Ordered in Other Encounters   Medication Dose Route Frequency Provider Last Rate Last Dose    ketamine (KETALAR) injection    PRN CELIA Mendoza CRNA   50 mg at 08/12/20 1232    propofol injection    PRN CELIA Mendoza CRNA   20 mg at 08/12/20 1231    propofol injection    Continuous PRN CELIA Mendoza CRNA 10.7 mL/hr at 08/12/20 1231 25 mcg/kg/min at 08/12/20 1231       Allergies:     Allergies   Allergen Reactions    Bee Venom        Problem List:    Patient Active Problem List   Diagnosis Code    Esophageal reflux K21.9    Hypothyroidism E03.9    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    Cataract H26.9    Allergic rhinitis J30.9    Pernicious anemia D51.0    Menopausal symptoms N95.1    Onychomycosis B35.1    Elevated blood pressure reading R03.0    Leukopenia D72.819    Edema R60.9    Shoulder pain, bilateral M25.511, M25.512    Pain in both feet M79.671, M79.672    Heart murmur R01.1    Impacted cerumen of both ears H61.23    Skin lesion L98.9    Closed bimalleolar fracture of right ankle S82.841A    Bimalleolar ankle fracture, left, closed, initial encounter S82.842A    Acute metabolic encephalopathy Y93.09    COVID-19 U07.1       Past Medical History:        Diagnosis Date    Carcinoma in situ of colon 1998    Diarrhea     Esophageal reflux     Other extrapyramidal disease and abnormal movement disorder     Pernicious anemia     S/P colonoscopy 4/09    OK - no further eval.    Unspecified cataract     Unspecified hypothyroidism     Unspecified pruritic disorder        Past Surgical History:        Procedure Laterality Date    COLECTOMY      partial    COLONOSCOPY  4/09    OK - no further eval    HYSTERECTOMY         Social History:    Social History     Tobacco Use    Smoking status: Never Smoker    Smokeless tobacco: Never Used   Substance Use Topics    Alcohol use:  No                                Counseling given: Not Answered      Vital Signs (Current):   Vitals:    08/12/20 0010 08/12/20 0312 08/12/20 0730 08/12/20 1035   BP: 135/72 (!) 142/70 138/73 139/74   Pulse: 98 90 101 100   Resp: 16 16 16 16   Temp: 97.8 °F (36.6 °C) 96.7 °F (35.9 °C) 97.4 °F (36.3 °C) 97.6 °F (36.4 °C)   TempSrc: Axillary Axillary Oral Axillary   SpO2: 94% 93% 94% 94%   Weight:       Height:                                                  BP Readings from Last 3 Encounters:   08/12/20 139/74   08/03/20 (!) 110/53   01/28/19 118/68       NPO Status: Time of last liquid consumption: 0800                        Time of last solid consumption: 0800                        Date of last liquid consumption: (jessica)                        Date of last solid food consumption: (jessica)    BMI:   Wt Readings from Last 3 Encounters:   08/05/20 157 lb 6.5 oz (71.4 kg)   08/03/20 155 lb (70.3 kg)   01/28/19 169 lb 5 oz (76.8 kg)     Body mass index is 28.79 kg/m².     CBC:   Lab Results   Component Value Date    WBC 5.2 08/12/2020    RBC 2.86 08/12/2020    HGB 8.3 08/12/2020    HCT 24.6 08/12/2020    MCV 85.9 08/12/2020    RDW 18.8 08/12/2020     08/12/2020       CMP:   Lab Results   Component Value Date     08/12/2020    K 5.0 08/12/2020     08/12/2020    CO2 23 08/12/2020    BUN 44 08/12/2020    CREATININE 1.7 08/12/2020    GFRAA 34 08/12/2020    GFRAA >60 01/05/2012    AGRATIO 1.0 08/12/2020    LABGLOM 28 08/12/2020    GLUCOSE 94 08/12/2020    GLUCOSE 92 07/09/2011    PROT 5.5 08/12/2020    PROT 7.3 01/05/2012    CALCIUM 8.5 08/12/2020    BILITOT 0.3 08/12/2020    ALKPHOS 65 08/12/2020    AST 23 08/12/2020    ALT 11 08/12/2020       POC Tests:   Recent Labs     08/12/20  1206   POCGLU 104*       Coags:   Lab Results   Component Value Date    PROTIME 12.8 08/11/2020    INR 1.10 08/11/2020    APTT 38.7 08/07/2020       HCG (If Applicable): No results found for: PREGTESTUR, PREGSERUM, HCG, HCGQUANT     ABGs: No results found for: PHART, PO2ART, NCG0NIE, KLY0QIE, BEART, T7UTCTFT     Type & Screen (If Applicable):  No results found for: LABABO, LABRH    Drug/Infectious Status (If Applicable):  No results found for: HIV, HEPCAB    COVID-19 Screening (If Applicable):   Lab Results   Component Value Date    COVID19 Not Detected 08/07/2020    [address] DETECTED 08/03/2020         Anesthesia Evaluation  Patient summary reviewed and Nursing notes reviewed no history of anesthetic complications:   Airway: Mallampati: II  TM distance: >3 FB   Neck ROM: limited  Comment: cx collar in place  fx C1  Mouth opening: > = 3 FB Dental: normal exam         Pulmonary: breath sounds clear to auscultation                             Cardiovascular:  Exercise tolerance: poor (<4 METS), (-) past MI    NYHA Classification: II    Rhythm: regular  Rate: normal                    Neuro/Psych:                ROS comment: Pt poor historian  Wander Veloz found down at [facility] facility  covid neg x 2,    Sustained C1 fx  Has collar in place    GI/Hepatic/Renal:   (+) GERD:,          ROS comment: Recent barium swallow   + for aspiration . Endo/Other:                     Abdominal:           Vascular:                                        Anesthesia Plan      MAC     ASA 4 - emergent       Induction: intravenous. Anesthetic plan and risks discussed with patient. Plan discussed with CRNA.     Attending anesthesiologist reviewed and agrees with Pre Eval content              Army Tiarra DO   8/12/2020

## 2020-08-12 NOTE — PROGRESS NOTES
[address] [place] [address]  GI Progress Note        Chris John is a 80 y.o. female patient. No diagnosis found. Admit Date: 8/3/2020    Subjective:       Patient very tired this AM. Per RN- tube feeds stopped at midnight. PEG placement this afternoon. KALIE Castrejon consented. ROS:  Pt unresponsive, unable to obtain    Scheduled Meds:   opium-belladonna  30 mg Rectal Once    ceFAZolin  1 g Intravenous Once    [Held by provider] heparin (porcine)  5,000 Units Subcutaneous 3 times per day    levothyroxine  125 mcg Oral Daily    sodium chloride flush  10 mL Intravenous 2 times per day    insulin lispro  0-6 Units Subcutaneous TID WC    insulin lispro  0-3 Units Subcutaneous Nightly       Continuous Infusions:   dextrose         PRN Meds:  acetaminophen **OR** acetaminophen, sodium chloride flush, polyethylene glycol, promethazine **OR** ondansetron, [Held by provider] magnesium sulfate, glucose, dextrose, glucagon (rDNA), dextrose, perflutren lipid microspheres      Objective:       Patient Vitals for the past 24 hrs:   BP Temp Temp src Pulse Resp SpO2   20 0730 138/73 97.4 °F (36.3 °C) Oral 101 16 94 %   20 0312 (!) 142/70 96.7 °F (35.9 °C) Axillary 90 16 93 %   20 0010 135/72 97.8 °F (36.6 °C) Axillary 98 16 94 %   20 1940 125/67 98.2 °F (36.8 °C) Axillary 98 16 94 %   20 1541 133/78 97.5 °F (36.4 °C) Oral 89 -- 98 %   20 1220 133/78 98.2 °F (36.8 °C) Oral 103 -- 93 %       Exam:  VITALS:  /73   Pulse 101   Temp 97.4 °F (36.3 °C) (Oral)   Resp 16   Ht 5' 2\" (1.575 m)   Wt 157 lb 6.5 oz (71.4 kg)   SpO2 94%   BMI 28.79 kg/m²   TEMPERATURE:  Current - Temp: 97.4 °F (36.3 °C);  Max - Temp  Av.6 °F (36.4 °C)  Min: 96.7 °F (35.9 °C)  Max: 98.2 °F (36.8 °C)    NAD  General appearance: alert, appears stated age, cooperative and no distress  Head: Normocephalic, without obvious abnormality, atraumatic  Neck: supple, symmetrical, trachea midline and thyroid not enlarged, symmetric, no tenderness/mass/nodules  CVS:  RRR, Nl s1s2  Lungs CTA Bilaterally, normal effort  Abdomen: soft, non-tender; bowel sounds normal; no masses,  no organomegaly  AAOx3, No asterixis or encephalopathy  Extremities: No edema. Recent Labs     08/10/20  0535 08/11/20 0449 08/12/20 0449   WBC 7.6 6.3 5.2   HGB 9.5* 8.7* 8.3*   HCT 28.4* 25.6* 24.6*   MCV 85.9 85.2 85.9    157 158     Recent Labs     08/10/20  0535 08/11/20  0449 08/12/20 0449    138 137   K 5.1  5.1 5.4*  5.4* 5.0    105 105   CO2 23 23 23   BUN 39* 45* 44*   CREATININE 1.6* 1.5* 1.7*     Recent Labs     08/10/20  0535 08/11/20 0449 08/12/20 0449   AST 24 22 23   ALT 12 11 11   BILITOT 0.3 0.3 0.3   ALKPHOS 76 78 65     No results for input(s): LIPASE, AMYLASE in the last 72 hours. Recent Labs     08/10/20  0535 08/11/20  0449 08/11/20  1525 08/12/20 0449   PROT 6.1* 5.8*  --  5.5*   INR  --   --  1.10  --      No results for input(s): PTT in the last 72 hours. No results for input(s): OCCULTBLD in the last 72 hours. Radiology review:     Assessment:       Active Problems:    Acute metabolic encephalopathy    COVID-19  Resolved Problems:    * No resolved hospital problems.  *      Recommendations:       - EGD with PEG placement scheduled for today at 12pm    Jenny Nasim  8/12/2020  9:16 AM

## 2020-08-12 NOTE — PLAN OF CARE
Problem: Gas Exchange - Impaired  Goal: Absence of hypoxia  Outcome: Ongoing  Note: VSS throughout shift, will continue to monitor. Problem: Patient Education: Go to Patient Education Activity  Goal: Patient/Family Education  Outcome: Ongoing  Note: Patient family updated, will continue to monitor patient.

## 2020-08-12 NOTE — PROGRESS NOTES
Patient on continuous bladder irrigation, now having blood clots, asked RN to update urology  For pain control we will give one-time dose of 0.5 mg Dilaudid

## 2020-08-12 NOTE — PROGRESS NOTES
Tube Feeding   Nutrition education/counseling/coordination of care: Continue Inpatient Monitoring  or Speech Therapy    NUTRITION MONITORING & EVALUATION:  Evaluation:Progressing towards goal   Goals:Goals: Patient to tolerate EN to meet 100% of nutrition needs  Monitoring: I/O, Pertinent Labs , TF Intake  or TF Tolerance      OBJECTIVE DATA:  · Nutrition-Focused Physical Findings: +confusion. Minimal verbalization. K+ improved 5.0 8/12    · Wounds: Multiple, skin tear, blister     Past Medical History:   Diagnosis Date    Carcinoma in situ of colon 1998    Diarrhea     Esophageal reflux     Other extrapyramidal disease and abnormal movement disorder     Pernicious anemia     S/P colonoscopy 4/09    OK - no further eval.    Unspecified cataract     Unspecified hypothyroidism     Unspecified pruritic disorder         ANTHROPOMETRICS  Current Height: 5' 2\" (157.5 cm)  Current Weight: 157 lb 6.5 oz (71.4 kg)    Admission weight: 155 lb (70.3 kg)  Ideal Bodyweight: 50kg  Usual Bodyweight: UTO  Adjusted Bodyweight: n/a  Weight Changes: No weight taken x7 days. BMI BMI (Calculated): 28.9    Wt Readings from Last 50 Encounters:   08/05/20 157 lb 6.5 oz (71.4 kg)   08/03/20 155 lb (70.3 kg)   01/28/19 169 lb 5 oz (76.8 kg)       COMPARATIVE STANDARDS  Estimated Total Kcals/Day : 22-25  Current Bodyweight (71.4 kg) 1571 - 1785 kcal  - overweight  Estimated Total Protein (g/day) : 1.3-1.5 Current Bodyweight (71.4 kg) 93 - 107g/day  Estimated Daily Total Fluid (ml/day): 1550 - 1800 mL per day     Food / Nutrition-Related History  Pre-Admission / Home Diet:  Pre-Admission/Home Diet: Unable to Obtain   Home Supplements / Herbals:   none noted  Food Restrictions / Cultural Requests:   none noted    Diet Orders / Intake / Nutrition Support  Current diet/supplement order: Dietary Nutrition Supplements: Protein Modular  Diet NPO, After Midnight Exceptions are:  Other (See Comment)     NSG Recorded PO:   PO Fluids P.O.: 0 mL  PO Meals PO Meals Eaten (%): 0   PO Intake: NPO  PO Supplement: NPO   PO Supplement Intake: NPO  IVF: none    Current EN Order/Goal: Nepro Renal @ 40 mL x23 hours provides 920 mL TV, 1656 kcal, 74.5 grams protein, 668 mL free water. Feeding Route: NG  Duration:  Continuous   Water bolus 160 ml every 4 hours when no IVF. Additives/Modulars:  Protein     NUTRITION RISK LEVEL: Risk Level:  Moderate     Alley Elliott, [address]  Julian:  [phone]  Office:  [phone]

## 2020-08-12 NOTE — ANESTHESIA POSTPROCEDURE EVALUATION
Department of Anesthesiology  Postprocedure Note    Patient: Danna Tiwari  MRN: [id number]  YOB: 1922  Date of evaluation: 8/12/2020  Time:  1:43 PM     Procedure Summary     Date:  08/12/20 Room / Location:  [place] [address] / [facility]    Anesthesia Start:  2042 Anesthesia Stop:  6986    Procedure:  EGD ESOPHAGOGASTRODUODENOSCOPY PEG TUBE INSERTION (N/A ) Diagnosis:  (malnutrition)    Surgeon:  Elizabeth Singh MD Responsible Provider:  Maggi Gabriel DO    Anesthesia Type:  MAC ASA Status:  4 - Emergent          Anesthesia Type: No value filed. Joyce Phase I: Joyce Score: 9    Joyce Phase II:      Last vitals: Reviewed and per EMR flowsheets.        Anesthesia Post Evaluation    Patient location during evaluation: PACU  Patient participation: complete - patient participated  Level of consciousness: awake and alert  Pain score: 0  Airway patency: patent  Nausea & Vomiting: no nausea and no vomiting  Complications: no  Cardiovascular status: hemodynamically stable  Respiratory status: acceptable  Hydration status: stable

## 2020-08-12 NOTE — FLOWSHEET NOTE
08/11/20 8529   Encounter Summary   Services provided to: Patient   Continue Visiting   (8/11, citlali )   Complexity of Encounter Low   Length of Encounter 15 minutes   Routine   Type Initial   Assessment   (No acknowledgement)   Intervention Explored feelings, thoughts, concerns   Outcome Did not respond   Staff Cosme harris MA

## 2020-08-13 ENCOUNTER — APPOINTMENT (OUTPATIENT)
Dept: GENERAL RADIOLOGY | Age: 85
DRG: 668 | End: 2020-08-13
Attending: INTERNAL MEDICINE
Payer: COMMERCIAL

## 2020-08-13 ENCOUNTER — APPOINTMENT (OUTPATIENT)
Dept: CT IMAGING | Age: 85
DRG: 668 | End: 2020-08-13
Attending: INTERNAL MEDICINE
Payer: COMMERCIAL

## 2020-08-13 ENCOUNTER — ANESTHESIA (OUTPATIENT)
Dept: OPERATING ROOM | Age: 85
DRG: 668 | End: 2020-08-13
Payer: COMMERCIAL

## 2020-08-13 VITALS
SYSTOLIC BLOOD PRESSURE: 155 MMHG | DIASTOLIC BLOOD PRESSURE: 87 MMHG | OXYGEN SATURATION: 95 % | RESPIRATION RATE: 30 BRPM

## 2020-08-13 PROBLEM — R31.9 HEMATURIA: Status: ACTIVE | Noted: 2020-08-13

## 2020-08-13 LAB
A/G RATIO: 0.9 (ref 1.1–2.2)
ALBUMIN SERPL-MCNC: 2.7 G/DL (ref 3.4–5)
ALP BLD-CCNC: 69 U/L (ref 40–129)
ALT SERPL-CCNC: 9 U/L (ref 10–40)
ANION GAP SERPL CALCULATED.3IONS-SCNC: 10 MMOL/L (ref 3–16)
AST SERPL-CCNC: 20 U/L (ref 15–37)
BASOPHILS ABSOLUTE: 0 K/UL (ref 0–0.2)
BASOPHILS RELATIVE PERCENT: 0.5 %
BILIRUB SERPL-MCNC: 0.4 MG/DL (ref 0–1)
BUN BLDV-MCNC: 44 MG/DL (ref 7–20)
CALCIUM SERPL-MCNC: 8.3 MG/DL (ref 8.3–10.6)
CHLORIDE BLD-SCNC: 107 MMOL/L (ref 99–110)
CO2: 21 MMOL/L (ref 21–32)
CREAT SERPL-MCNC: 1.6 MG/DL (ref 0.6–1.2)
EOSINOPHILS ABSOLUTE: 0.1 K/UL (ref 0–0.6)
EOSINOPHILS RELATIVE PERCENT: 0.8 %
GFR AFRICAN AMERICAN: 36
GFR NON-AFRICAN AMERICAN: 30
GLOBULIN: 3 G/DL
GLUCOSE BLD-MCNC: 106 MG/DL (ref 70–99)
GLUCOSE BLD-MCNC: 108 MG/DL (ref 70–99)
HCT VFR BLD CALC: 25.6 % (ref 36–48)
HEMOGLOBIN: 8.6 G/DL (ref 12–16)
LYMPHOCYTES ABSOLUTE: 0.5 K/UL (ref 1–5.1)
LYMPHOCYTES RELATIVE PERCENT: 6.3 %
MAGNESIUM: 2.2 MG/DL (ref 1.8–2.4)
MCH RBC QN AUTO: 29.1 PG (ref 26–34)
MCHC RBC AUTO-ENTMCNC: 33.8 G/DL (ref 31–36)
MCV RBC AUTO: 85.9 FL (ref 80–100)
MONOCYTES ABSOLUTE: 0.8 K/UL (ref 0–1.3)
MONOCYTES RELATIVE PERCENT: 10.5 %
NEUTROPHILS ABSOLUTE: 6 K/UL (ref 1.7–7.7)
NEUTROPHILS RELATIVE PERCENT: 81.9 %
PDW BLD-RTO: 18.5 % (ref 12.4–15.4)
PERFORMED ON: ABNORMAL
PLATELET # BLD: 187 K/UL (ref 135–450)
PMV BLD AUTO: 7.9 FL (ref 5–10.5)
POTASSIUM REFLEX MAGNESIUM: 4.7 MMOL/L (ref 3.5–5.1)
POTASSIUM SERPL-SCNC: 4.7 MMOL/L (ref 3.5–5.1)
RBC # BLD: 2.97 M/UL (ref 4–5.2)
SODIUM BLD-SCNC: 138 MMOL/L (ref 136–145)
TOTAL PROTEIN: 5.7 G/DL (ref 6.4–8.2)
WBC # BLD: 7.3 K/UL (ref 4–11)

## 2020-08-13 PROCEDURE — 85025 COMPLETE CBC W/AUTO DIFF WBC: CPT

## 2020-08-13 PROCEDURE — 0BH17EZ INSERTION OF ENDOTRACHEAL AIRWAY INTO TRACHEA, VIA NATURAL OR ARTIFICIAL OPENING: ICD-10-PCS | Performed by: INTERNAL MEDICINE

## 2020-08-13 PROCEDURE — 0TBB8ZX EXCISION OF BLADDER, VIA NATURAL OR ARTIFICIAL OPENING ENDOSCOPIC, DIAGNOSTIC: ICD-10-PCS | Performed by: UROLOGY

## 2020-08-13 PROCEDURE — 0TCB8ZZ EXTIRPATION OF MATTER FROM BLADDER, VIA NATURAL OR ARTIFICIAL OPENING ENDOSCOPIC: ICD-10-PCS | Performed by: UROLOGY

## 2020-08-13 PROCEDURE — 2000000000 HC ICU R&B

## 2020-08-13 PROCEDURE — 97530 THERAPEUTIC ACTIVITIES: CPT

## 2020-08-13 PROCEDURE — 0T5B8ZZ DESTRUCTION OF BLADDER, VIA NATURAL OR ARTIFICIAL OPENING ENDOSCOPIC: ICD-10-PCS | Performed by: UROLOGY

## 2020-08-13 PROCEDURE — 94770 HC ETCO2 MONITOR DAILY: CPT

## 2020-08-13 PROCEDURE — 74420 UROGRAPHY RTRGR +-KUB: CPT

## 2020-08-13 PROCEDURE — 6360000002 HC RX W HCPCS: Performed by: INTERNAL MEDICINE

## 2020-08-13 PROCEDURE — 36415 COLL VENOUS BLD VENIPUNCTURE: CPT

## 2020-08-13 PROCEDURE — 2580000003 HC RX 258: Performed by: INTERNAL MEDICINE

## 2020-08-13 PROCEDURE — 80053 COMPREHEN METABOLIC PANEL: CPT

## 2020-08-13 PROCEDURE — 3600000014 HC SURGERY LEVEL 4 ADDTL 15MIN: Performed by: UROLOGY

## 2020-08-13 PROCEDURE — 94750 HC PULMONARY COMPLIANCE STUDY: CPT

## 2020-08-13 PROCEDURE — 6360000002 HC RX W HCPCS: Performed by: STUDENT IN AN ORGANIZED HEALTH CARE EDUCATION/TRAINING PROGRAM

## 2020-08-13 PROCEDURE — 83735 ASSAY OF MAGNESIUM: CPT

## 2020-08-13 PROCEDURE — 94761 N-INVAS EAR/PLS OXIMETRY MLT: CPT

## 2020-08-13 PROCEDURE — 5A1935Z RESPIRATORY VENTILATION, LESS THAN 24 CONSECUTIVE HOURS: ICD-10-PCS | Performed by: INTERNAL MEDICINE

## 2020-08-13 PROCEDURE — 3700000000 HC ANESTHESIA ATTENDED CARE: Performed by: UROLOGY

## 2020-08-13 PROCEDURE — 3600000004 HC SURGERY LEVEL 4 BASE: Performed by: UROLOGY

## 2020-08-13 PROCEDURE — 7100000000 HC PACU RECOVERY - FIRST 15 MIN: Performed by: UROLOGY

## 2020-08-13 PROCEDURE — 2700000000 HC OXYGEN THERAPY PER DAY

## 2020-08-13 PROCEDURE — 6370000000 HC RX 637 (ALT 250 FOR IP): Performed by: INTERNAL MEDICINE

## 2020-08-13 PROCEDURE — 97110 THERAPEUTIC EXERCISES: CPT

## 2020-08-13 PROCEDURE — 2709999900 HC NON-CHARGEABLE SUPPLY: Performed by: UROLOGY

## 2020-08-13 PROCEDURE — 88305 TISSUE EXAM BY PATHOLOGIST: CPT

## 2020-08-13 PROCEDURE — 94002 VENT MGMT INPAT INIT DAY: CPT

## 2020-08-13 PROCEDURE — 2580000003 HC RX 258: Performed by: UROLOGY

## 2020-08-13 PROCEDURE — 72193 CT PELVIS W/DYE: CPT

## 2020-08-13 PROCEDURE — 3700000001 HC ADD 15 MINUTES (ANESTHESIA): Performed by: UROLOGY

## 2020-08-13 PROCEDURE — 2500000003 HC RX 250 WO HCPCS: Performed by: NURSE ANESTHETIST, CERTIFIED REGISTERED

## 2020-08-13 PROCEDURE — 6360000004 HC RX CONTRAST MEDICATION: Performed by: UROLOGY

## 2020-08-13 PROCEDURE — 7100000001 HC PACU RECOVERY - ADDTL 15 MIN: Performed by: UROLOGY

## 2020-08-13 PROCEDURE — 6360000002 HC RX W HCPCS: Performed by: NURSE ANESTHETIST, CERTIFIED REGISTERED

## 2020-08-13 PROCEDURE — 6370000000 HC RX 637 (ALT 250 FOR IP): Performed by: UROLOGY

## 2020-08-13 RX ORDER — SCOLOPAMINE TRANSDERMAL SYSTEM 1 MG/1
1 PATCH, EXTENDED RELEASE TRANSDERMAL
Status: DISCONTINUED | OUTPATIENT
Start: 2020-08-13 | End: 2020-08-14 | Stop reason: HOSPADM

## 2020-08-13 RX ORDER — LORAZEPAM 2 MG/ML
2 INJECTION INTRAMUSCULAR
Status: DISCONTINUED | OUTPATIENT
Start: 2020-08-13 | End: 2020-08-14 | Stop reason: HOSPADM

## 2020-08-13 RX ORDER — ENALAPRILAT 2.5 MG/2ML
1.25 INJECTION INTRAVENOUS
Status: CANCELLED | OUTPATIENT
Start: 2020-08-13 | End: 2020-08-13

## 2020-08-13 RX ORDER — MORPHINE SULFATE 4 MG/ML
4 INJECTION, SOLUTION INTRAMUSCULAR; INTRAVENOUS
Status: DISCONTINUED | OUTPATIENT
Start: 2020-08-13 | End: 2020-08-14 | Stop reason: HOSPADM

## 2020-08-13 RX ORDER — ONDANSETRON 2 MG/ML
INJECTION INTRAMUSCULAR; INTRAVENOUS PRN
Status: DISCONTINUED | OUTPATIENT
Start: 2020-08-13 | End: 2020-08-13 | Stop reason: SDUPTHER

## 2020-08-13 RX ORDER — ONDANSETRON 2 MG/ML
4 INJECTION INTRAMUSCULAR; INTRAVENOUS
Status: CANCELLED | OUTPATIENT
Start: 2020-08-13 | End: 2020-08-13

## 2020-08-13 RX ORDER — FENTANYL CITRATE 50 UG/ML
INJECTION, SOLUTION INTRAMUSCULAR; INTRAVENOUS PRN
Status: DISCONTINUED | OUTPATIENT
Start: 2020-08-13 | End: 2020-08-13 | Stop reason: SDUPTHER

## 2020-08-13 RX ORDER — LIDOCAINE HYDROCHLORIDE 20 MG/ML
JELLY TOPICAL PRN
Status: DISCONTINUED | OUTPATIENT
Start: 2020-08-13 | End: 2020-08-13 | Stop reason: HOSPADM

## 2020-08-13 RX ORDER — DEXAMETHASONE SODIUM PHOSPHATE 4 MG/ML
INJECTION, SOLUTION INTRA-ARTICULAR; INTRALESIONAL; INTRAMUSCULAR; INTRAVENOUS; SOFT TISSUE PRN
Status: DISCONTINUED | OUTPATIENT
Start: 2020-08-13 | End: 2020-08-13 | Stop reason: SDUPTHER

## 2020-08-13 RX ORDER — FENTANYL CITRATE 50 UG/ML
50 INJECTION, SOLUTION INTRAMUSCULAR; INTRAVENOUS EVERY 5 MIN PRN
Status: CANCELLED | OUTPATIENT
Start: 2020-08-13

## 2020-08-13 RX ORDER — SUCCINYLCHOLINE CHLORIDE 20 MG/ML
INJECTION INTRAMUSCULAR; INTRAVENOUS PRN
Status: DISCONTINUED | OUTPATIENT
Start: 2020-08-13 | End: 2020-08-13 | Stop reason: SDUPTHER

## 2020-08-13 RX ORDER — MAGNESIUM HYDROXIDE 1200 MG/15ML
LIQUID ORAL PRN
Status: DISCONTINUED | OUTPATIENT
Start: 2020-08-13 | End: 2020-08-13 | Stop reason: HOSPADM

## 2020-08-13 RX ORDER — PROPOFOL 10 MG/ML
10 INJECTION, EMULSION INTRAVENOUS
Status: DISCONTINUED | OUTPATIENT
Start: 2020-08-13 | End: 2020-08-13

## 2020-08-13 RX ORDER — KETAMINE HYDROCHLORIDE 50 MG/ML
INJECTION, SOLUTION, CONCENTRATE INTRAMUSCULAR; INTRAVENOUS PRN
Status: DISCONTINUED | OUTPATIENT
Start: 2020-08-13 | End: 2020-08-13 | Stop reason: SDUPTHER

## 2020-08-13 RX ORDER — PROPOFOL 10 MG/ML
INJECTION, EMULSION INTRAVENOUS PRN
Status: DISCONTINUED | OUTPATIENT
Start: 2020-08-13 | End: 2020-08-13 | Stop reason: SDUPTHER

## 2020-08-13 RX ORDER — MORPHINE SULFATE 2 MG/ML
2 INJECTION, SOLUTION INTRAMUSCULAR; INTRAVENOUS
Status: DISCONTINUED | OUTPATIENT
Start: 2020-08-13 | End: 2020-08-14 | Stop reason: HOSPADM

## 2020-08-13 RX ORDER — FENTANYL CITRATE 50 UG/ML
INJECTION, SOLUTION INTRAMUSCULAR; INTRAVENOUS
Status: DISPENSED
Start: 2020-08-13 | End: 2020-08-14

## 2020-08-13 RX ORDER — FENTANYL CITRATE 50 UG/ML
25 INJECTION, SOLUTION INTRAMUSCULAR; INTRAVENOUS EVERY 5 MIN PRN
Status: CANCELLED | OUTPATIENT
Start: 2020-08-13

## 2020-08-13 RX ORDER — LABETALOL HYDROCHLORIDE 5 MG/ML
10 INJECTION, SOLUTION INTRAVENOUS EVERY 4 HOURS PRN
Status: DISCONTINUED | OUTPATIENT
Start: 2020-08-13 | End: 2020-08-13

## 2020-08-13 RX ORDER — LABETALOL 20 MG/4 ML (5 MG/ML) INTRAVENOUS SYRINGE
5 EVERY 10 MIN PRN
Status: CANCELLED | OUTPATIENT
Start: 2020-08-13

## 2020-08-13 RX ORDER — CEFAZOLIN SODIUM 1 G/3ML
INJECTION, POWDER, FOR SOLUTION INTRAMUSCULAR; INTRAVENOUS PRN
Status: DISCONTINUED | OUTPATIENT
Start: 2020-08-13 | End: 2020-08-13 | Stop reason: SDUPTHER

## 2020-08-13 RX ORDER — ROCURONIUM BROMIDE 10 MG/ML
INJECTION, SOLUTION INTRAVENOUS PRN
Status: DISCONTINUED | OUTPATIENT
Start: 2020-08-13 | End: 2020-08-13 | Stop reason: SDUPTHER

## 2020-08-13 RX ORDER — HYDRALAZINE HYDROCHLORIDE 20 MG/ML
5 INJECTION INTRAMUSCULAR; INTRAVENOUS EVERY 5 MIN PRN
Status: CANCELLED | OUTPATIENT
Start: 2020-08-13

## 2020-08-13 RX ORDER — PROPOFOL 10 MG/ML
INJECTION, EMULSION INTRAVENOUS CONTINUOUS PRN
Status: DISCONTINUED | OUTPATIENT
Start: 2020-08-13 | End: 2020-08-13 | Stop reason: SDUPTHER

## 2020-08-13 RX ADMIN — LORAZEPAM 2 MG: 2 INJECTION INTRAMUSCULAR; INTRAVENOUS at 18:30

## 2020-08-13 RX ADMIN — SUCCINYLCHOLINE CHLORIDE 100 MG: 20 INJECTION, SOLUTION INTRAMUSCULAR; INTRAVENOUS; PARENTERAL at 14:00

## 2020-08-13 RX ADMIN — FENTANYL CITRATE 12.5 MCG: 50 INJECTION INTRAMUSCULAR; INTRAVENOUS at 13:10

## 2020-08-13 RX ADMIN — KETAMINE HYDROCHLORIDE 5 MG: 50 INJECTION, SOLUTION INTRAMUSCULAR; INTRAVENOUS at 13:13

## 2020-08-13 RX ADMIN — PROPOFOL 50 MCG/KG/MIN: 10 INJECTION, EMULSION INTRAVENOUS at 13:15

## 2020-08-13 RX ADMIN — KETAMINE HYDROCHLORIDE 10 MG: 50 INJECTION, SOLUTION INTRAMUSCULAR; INTRAVENOUS at 13:06

## 2020-08-13 RX ADMIN — FENTANYL CITRATE 50 MCG: 50 INJECTION INTRAMUSCULAR; INTRAVENOUS at 13:18

## 2020-08-13 RX ADMIN — FENTANYL CITRATE 25 MCG: 50 INJECTION INTRAMUSCULAR; INTRAVENOUS at 14:00

## 2020-08-13 RX ADMIN — ONDANSETRON 4 MG: 2 INJECTION INTRAMUSCULAR; INTRAVENOUS at 13:10

## 2020-08-13 RX ADMIN — FENTANYL CITRATE 25 MCG: 50 INJECTION INTRAMUSCULAR; INTRAVENOUS at 13:43

## 2020-08-13 RX ADMIN — DEXAMETHASONE SODIUM PHOSPHATE 4 MG: 4 INJECTION, SOLUTION INTRAMUSCULAR; INTRAVENOUS at 13:10

## 2020-08-13 RX ADMIN — KETAMINE HYDROCHLORIDE 15 MG: 50 INJECTION, SOLUTION INTRAMUSCULAR; INTRAVENOUS at 12:54

## 2020-08-13 RX ADMIN — PROPOFOL 20 MG: 10 INJECTION, EMULSION INTRAVENOUS at 13:17

## 2020-08-13 RX ADMIN — PROPOFOL 20 MG: 10 INJECTION, EMULSION INTRAVENOUS at 13:12

## 2020-08-13 RX ADMIN — Medication 10 ML: at 08:40

## 2020-08-13 RX ADMIN — PROPOFOL 20 MG: 10 INJECTION, EMULSION INTRAVENOUS at 13:06

## 2020-08-13 RX ADMIN — MORPHINE SULFATE 4 MG: 4 INJECTION INTRAVENOUS at 18:30

## 2020-08-13 RX ADMIN — IOPAMIDOL 40 ML: 755 INJECTION, SOLUTION INTRAVENOUS at 15:09

## 2020-08-13 RX ADMIN — PROPOFOL 50 MG: 10 INJECTION, EMULSION INTRAVENOUS at 14:00

## 2020-08-13 RX ADMIN — PROPOFOL 20 MG: 10 INJECTION, EMULSION INTRAVENOUS at 13:01

## 2020-08-13 RX ADMIN — CEFAZOLIN SODIUM 2000 MG: 1 POWDER, FOR SOLUTION INTRAMUSCULAR; INTRAVENOUS at 13:23

## 2020-08-13 RX ADMIN — MEROPENEM 500 MG: 500 INJECTION, POWDER, FOR SOLUTION INTRAVENOUS at 16:07

## 2020-08-13 RX ADMIN — FENTANYL CITRATE 12.5 MCG: 50 INJECTION INTRAMUSCULAR; INTRAVENOUS at 13:13

## 2020-08-13 RX ADMIN — ROCURONIUM BROMIDE 30 MG: 10 INJECTION INTRAVENOUS at 14:00

## 2020-08-13 RX ADMIN — PROPOFOL 20 MG: 10 INJECTION, EMULSION INTRAVENOUS at 12:54

## 2020-08-13 RX ADMIN — PROPOFOL 10 MCG/KG/MIN: 10 INJECTION, EMULSION INTRAVENOUS at 16:07

## 2020-08-13 ASSESSMENT — PULMONARY FUNCTION TESTS
PIF_VALUE: 0
PIF_VALUE: 1
PIF_VALUE: 0
PIF_VALUE: 1
PIF_VALUE: 27
PIF_VALUE: 0
PIF_VALUE: 1
PIF_VALUE: 0
PIF_VALUE: 0
PIF_VALUE: 1
PIF_VALUE: 0
PIF_VALUE: 1
PIF_VALUE: 0
PIF_VALUE: 1
PIF_VALUE: 0
PIF_VALUE: 30
PIF_VALUE: 1
PIF_VALUE: 1
PIF_VALUE: 0
PIF_VALUE: 1
PIF_VALUE: 0
PIF_VALUE: 1
PIF_VALUE: 0
PIF_VALUE: 1
PIF_VALUE: 0
PIF_VALUE: 1
PIF_VALUE: 0
PIF_VALUE: 1
PIF_VALUE: 0
PIF_VALUE: 1
PIF_VALUE: 0
PIF_VALUE: 1
PIF_VALUE: 1
PIF_VALUE: 0
PIF_VALUE: 31
PIF_VALUE: 0
PIF_VALUE: 1
PIF_VALUE: 0
PIF_VALUE: 0
PIF_VALUE: 1
PIF_VALUE: 1
PIF_VALUE: 0
PIF_VALUE: 0

## 2020-08-13 ASSESSMENT — PAIN SCALES - PAIN ASSESSMENT IN ADVANCED DEMENTIA (PAINAD)
CONSOLABILITY: 0
BODYLANGUAGE: 0
BODYLANGUAGE: 0
BREATHING: 0
BODYLANGUAGE: 0
FACIALEXPRESSION: 0
NEGVOCALIZATION: 0
TOTALSCORE: 0
CONSOLABILITY: 0
NEGVOCALIZATION: 0
FACIALEXPRESSION: 0
NEGVOCALIZATION: 0
BODYLANGUAGE: 0
FACIALEXPRESSION: 0
CONSOLABILITY: 0
CONSOLABILITY: 0
TOTALSCORE: 0
TOTALSCORE: 0
NEGVOCALIZATION: 0
BREATHING: 0
FACIALEXPRESSION: 0
BREATHING: 0
TOTALSCORE: 0
BREATHING: 0

## 2020-08-13 ASSESSMENT — PAIN DESCRIPTION - LOCATION: LOCATION: ABDOMEN

## 2020-08-13 ASSESSMENT — PAIN SCALES - GENERAL
PAINLEVEL_OUTOF10: 0
PAINLEVEL_OUTOF10: 10
PAINLEVEL_OUTOF10: 0
PAINLEVEL_OUTOF10: 10
PAINLEVEL_OUTOF10: 0

## 2020-08-13 ASSESSMENT — PAIN DESCRIPTION - DESCRIPTORS: DESCRIPTORS: SORE

## 2020-08-13 ASSESSMENT — PAIN SCALES - WONG BAKER
WONGBAKER_NUMERICALRESPONSE: 0
WONGBAKER_NUMERICALRESPONSE: 0

## 2020-08-13 ASSESSMENT — PAIN DESCRIPTION - PAIN TYPE: TYPE: SURGICAL PAIN

## 2020-08-13 NOTE — FLOWSHEET NOTE
08/13/20 1721   Encounter Summary   Services provided to: Patient and family together;Patient not available   Referral/Consult From: Rounding   Continue Visiting   (8/13citlali )   Complexity of Encounter High   Length of Encounter 30 minutes   Routine   Type Initial   Spiritual/Presybeterian   Type Spiritual support   Assessment Calm; Approachable;Peaceful; Anticipatory grief   Intervention Active listening;Explored feelings, thoughts, concerns;Contacted support as requested per patient/family request;Discussed belief system/Druze practices/donna;Discussed illness/injury and it's impact   Outcome Comfort;Expressed gratitude;Engaged in conversation   Sacraments   Sacrament of Sick-Anointing Family requested anointing   >PT is Jainism and family has requested the sacrament of the sick.  Ming's ETA is about 6:15pm  >With then did a legacy review. Family seems to be in a good spiritual and emotional place at this time.   Staff Rik Garcia MA

## 2020-08-13 NOTE — BRIEF OP NOTE
Brief Postoperative Note      Patient: Kris Valadez  YOB: 1922  MRN: [id number]    Date of Procedure: 8/13/2020    Pre-Op Diagnosis: Gross hematuria [R31.0]    Post-Op Diagnosis: Same       Procedure(s):  CYSTOSCOPY, CLOT EVACUATION,  FULGERATION, BLADDER BIOPSY    Surgeon(s):  Tolu Quintanilla MD    Assistant:  * No surgical staff found *    Anesthesia: General    Estimated Blood Loss (mL): less than 50     Complications: Unplanned perforation of  bladder    Specimens:   ID Type Source Tests Collected by Time Destination   A : BLADDER BIOPSY Tissue Tissue SURGICAL PATHOLOGY Tolu Quintanilla MD 8/13/2020 1329        Implants:  * No implants in log *      Drains:   NG/OG/NJ/NE Tube Nasogastric Right nostril (Active)   Surrounding Skin Intact 08/10/20 0805   Securement device Yes 08/10/20 0805   Status Other (Comment) 08/1934   Placement Verified by External Catheter Length 08/10/20 0805   Drainage Appearance None 08/10/20 0805   Tube Feeding Standard with Fiber 08/10/20 0805   Tube Feeding Status Continuous 08/10/20 0805   Rate/Schedule 55 mL/hr 08/10/20 0805   Tube Feeding Intake (mL) 61 ml 08/12/20 2344   Free Water Flush (mL) 30 mL 08/12/20 2344   Free Water Rate 30q 4 08/12/20 2344   Residual Volume (ml) 10 ml 08/12/20 2035       Gastrostomy/Enterostomy/Jejunostomy LUQ 1 20 fr (Active)   Site Description Unable to view 08/12/20 1254   Drain Status Clamped 08/12/20 1254   Dressing Status Clean;Dry; Intact 08/12/20 1254       Urethral Catheter Triple-lumen 24 fr (Active)       [REMOVED] Urethral Catheter (Removed)   Catheter Indications Bladder injury or continuousbladder irrigation 08/03/20 1059   Securement Device Date Changed 08/03/20 08/03/20 1059   Urine Color Bloody 08/03/20 1059   Urine Appearance Clots 08/03/20 1059   Output (mL) 450 mL 08/03/20 1059       [REMOVED] Urethral Catheter Straight-tip 20 fr (Removed)   $ Urethral catheter insertion $ Not inserted for procedure 08/03/20 1510 Catheter Indications Urology/Urologist seeing this patient or inserted indwelling catheter 08/05/20 1146   Securement Device Date Changed 08/05/20 08/05/20 0730   Site Assessment [place] 08/05/20 1146   Urine Color Bloody 08/05/20 1146   Urine Appearance Clots 08/05/20 1146   Output (mL) 250 mL 08/05/20 0730       [REMOVED] Urethral Catheter Triple-lumen 24 fr (Removed)   $ Urethral catheter insertion $ Not inserted for procedure 08/05/20 1110   Catheter Indications Urology/Urologist seeing this patient or inserted indwelling catheter;Bladder injury or continuousbladder irrigation 08/13/20 0820   Securement Device Date Changed 08/05/20 08/05/20 1811   Site Assessment Pink 08/12/20 0312   Urine Color Pink 08/13/20 0820   Urine Appearance Clear 08/13/20 0820   Output (mL) 700 mL 08/12/20 1254       [REMOVED] External Urinary Catheter (Removed)       Findings: diffuse clot adherent to bladder mucosa; 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

## 2020-08-13 NOTE — PROGRESS NOTES
26- Pt arrives to pacu from /cysto accompanied by Vanessa og and RN. Attached to monitors. Intraop meds reviewed and per report at bedside, the MD is concerned for bladder perforation which occurred during the case. Pt arrives to pacu, tachypneic, breathing shallow and stating \"I can't breathe\". Hypertensive on arrival, however oxygen saturation on simple face mask at 10L O2 is %. Dr. Nomi Alva called to bedside immediately upon arrival as pt was to have CT scan following arrival to pacu, but he was also called for concerns about hypertension, tachypnea, and swelling increasing to the RLQ of abd which had visibly increased since arrival to pacu. 1400- Dr. Nomi Alva to bedside, who stated Dr. Pablo Vidal would be covering the case for CT. Dr. Pablo Vidal called and came to bedside at 1402. Lis Mcneal and Rubi George crna also arrived at 2244. Explained concerns about increasing swelling in abd, hypertension and RR <24 a minute with the patient complaining that she cannot breathe. [phone] Dr. Calderon Likes contacted by charge RN Gelacio Solis I who stated pt should go to CT scan asap and then to ICU following. He has had a conversation with pt's family in regards to futher surgical procedures if warranted and code status. 1415- Pt intubed by Reji Duron at bedside. 1420- Pt placed on ventilator by RT Kermitt Sever. 1430- pt leaves pacu for CT scan  1436- pt arrives in CT scan (pt remains hypertensive during ct scan with /111, 165/111 and HR 84-98)  1510- Pt leaves CT scan and transferred to ICU  1525- arrival to ICU and bedside SBAR report given to Will RN at bedside.

## 2020-08-13 NOTE — PROGRESS NOTES
[address] [place] [address]  GI Progress Note        Chau Burgos is a 80 y.o. female patient. No diagnosis found. Admit Date: 8/3/2020    Subjective:       PEG placed yesterday afternoon. No complications. Tube feeds started yesterday at 20 goal 40, stopped at midnight in prep for cysto this afternoon.        ROS:  Cardiovascular ROS: no chest pain or dyspnea on exertion  Gastrointestinal ROS: no abdominal pain, change in bowel habits, or black or bloody stools  Respiratory ROS: no cough, shortness of breath, or wheezing    Scheduled Meds:   [Held by provider] heparin (porcine)  5,000 Units Subcutaneous 3 times per day    levothyroxine  125 mcg Oral Daily    sodium chloride flush  10 mL Intravenous 2 times per day    insulin lispro  0-6 Units Subcutaneous TID WC    insulin lispro  0-3 Units Subcutaneous Nightly       Continuous Infusions:   dextrose         PRN Meds:  acetaminophen **OR** acetaminophen, sodium chloride flush, polyethylene glycol, promethazine **OR** ondansetron, [Held by provider] magnesium sulfate, glucose, dextrose, glucagon (rDNA), dextrose, perflutren lipid microspheres      Objective:       Patient Vitals for the past 24 hrs:   BP Temp Temp src Pulse Resp SpO2   08/13/20 0839 134/75 97.9 °F (36.6 °C) Oral 100 16 93 %   08/13/20 0256 131/73 97.5 °F (36.4 °C) Oral 107 16 94 %   08/12/20 2343 118/74 97.6 °F (36.4 °C) Oral 109 16 98 %   08/12/20 2033 130/84 97.5 °F (36.4 °C) Oral 108 16 94 %   08/12/20 1734 (!) 159/89 96.7 °F (35.9 °C) Oral 103 16 99 %   08/12/20 1612 -- -- -- -- -- 94 %   08/12/20 1320 132/69 97.3 °F (36.3 °C) Temporal 90 (!) 31 97 %   08/12/20 1315 122/64 -- -- 89 14 96 %   08/12/20 1310 (!) 126/46 -- -- 94 26 96 %   08/12/20 1300 (!) 100/46 -- -- 94 25 95 %   08/12/20 1255 (!) 97/54 -- -- 105 13 95 %   08/12/20 1254 (!) 99/55 97.3 °F (36.3 °C) Temporal 93 21 97 %   08/12/20 1035 139/74 97.6 °F (36.4 °C) Axillary 100 16 94 %       Exam:  VITALS:  /75   Pulse 100   Temp 97.9 °F (36.6 °C) (Oral)   Resp 16   Ht 5' 2\" (1.575 m)   Wt 157 lb 6.5 oz (71.4 kg)   SpO2 93%   BMI 28.79 kg/m²   TEMPERATURE:  Current - Temp: 97.9 °F (36.6 °C); Max - Temp  Av.4 °F (36.3 °C)  Min: 96.7 °F (35.9 °C)  Max: 97.9 °F (36.6 °C)    NAD  General appearance: alert, appears stated age, cooperative and no distress  Head: Normocephalic, without obvious abnormality, atraumatic  Neck: supple, symmetrical, trachea midline and thyroid not enlarged, symmetric, no tenderness/mass/nodules  CVS:  RRR, Nl s1s2  Lungs CTA Bilaterally, normal effort  Abdomen: soft, non-tender; bowel sounds normal; no masses,  no organomegaly and PEG in place  AAOx3, No asterixis or encephalopathy  Extremities: No edema. Recent Labs     20  0510   WBC 6.3 5.2 7.3   HGB 8.7* 8.3* 8.6*   HCT 25.6* 24.6* 25.6*   MCV 85.2 85.9 85.9    158 187     Recent Labs     20  0510    137 138   K 5.4*  5.4* 5.0 4.7  4.7    105 107   CO2 23 23 21   BUN 45* 44* 44*   CREATININE 1.5* 1.7* 1.6*     Recent Labs     20  0510   AST 22 23 20   ALT 11 11 9*   BILITOT 0.3 0.3 0.4   ALKPHOS 78 65 69     No results for input(s): LIPASE, AMYLASE in the last 72 hours. Recent Labs     20  1525 20  0510   PROT 5.8*  --  5.5* 5.7*   INR  --  1.10  --   --      No results for input(s): PTT in the last 72 hours. No results for input(s): OCCULTBLD in the last 72 hours. Radiology review:     Assessment:       Active Problems:    Acute metabolic encephalopathy    COVID-19  Resolved Problems:    * No resolved hospital problems.  *      Feeding difficulties sp PEG    Recommendations:       - Resume tube feeds when appropriate  -     Elise Jordan  2020  9:45 AM

## 2020-08-13 NOTE — PROGRESS NOTES
Physical Therapy  Facility/Department: [facility]  Daily Treatment Note  NAME: Darcie Esquivel  : 3/12/1922  MRN: [id number]    Date of Service: 2020    Discharge Recommendations:    Darcie Esquivel scored a 6/24 on the AM-PAC short mobility form. Current research shows that an AM-PAC score of 17 or less is typically not associated with a discharge to the patient's home setting. Based on the patient's AM-PAC score and their current functional mobility deficits, it is recommended that the patient have 3-5 sessions per week of Physical Therapy at d/c to increase the patient's independence. Please see assessment section for further patient specific details. If patient discharges prior to next session this note will serve as a discharge summary. Please see below for the latest assessment towards goals. DME - defer to next LOC    Assessment   Assessment: Pt was able to participate with therapies today, Very [place] but appropriate when she hears us. Made eye contact, responsive and sat on the eob with assist..  Well below her normal baseline. Recommend continued IP therapies. PT Education: General Safety;PT Role;Functional Mobility Training  Patient Education: Pt expresses verbal understanding. Activity Tolerance  Activity Tolerance: Patient Tolerated treatment well  Activity Tolerance: Pt participated with therapy very well today. Patient Diagnosis(es): There were no encounter diagnoses. has a past medical history of Carcinoma in situ of colon, Diarrhea, Esophageal reflux, Other extrapyramidal disease and abnormal movement disorder, Pernicious anemia, S/P colonoscopy, Unspecified cataract, Unspecified hypothyroidism, and Unspecified pruritic disorder. has a past surgical history that includes colectomy; Colonoscopy (); Hysterectomy; and Upper gastrointestinal endoscopy (N/A, 2020).     Restrictions  Position Activity Restriction  Other position/activity restrictions: up as tolerated, COVID positive-airborne precautions, [place] at all times (except when eating, bathing or showering)  Subjective   Subjective  Subjective: Pt supine in bed resting, eyes closed. Once awakened and given glasses to see, pt was responsive, very [place], asks us to speak into her left ear with hearing aide. Appropriate responses to questions about pain. \"I don't want to sit on the edge of the bed\"  Pain Screening  Patient Currently in Pain: Yes(Pt notes \"a little\" neck pain and arm pain. Notes medium abdominal pain - PEG tube placed yesterday.)  Vital Signs  Patient Currently in Pain: Yes(Pt notes \"a little\" neck pain and arm pain. Notes medium abdominal pain - PEG tube placed yesterday.)       Orientation  Orientation  Orientation Level: Oriented to person  Cognition   Cognition  Cognition Comment: Pt responded approriately to one step commands, asked us to speak into her left ear. Also better once wearing glasses. Objective   Bed mobility  Supine to Sit: Maximum assistance;2 Person assistance  Sit to Supine: Maximum assistance;2 Person assistance  Scooting: Dependent/Total;2 Person assistance(to scoot up in bed)           Balance  Comments: Pt sat on EOB x 12 min, initial mod assist, progressed at times to CG. Patient participated actively with exercises in sitting.   Exercises  Knee Long Arc Quad: 2 x 5 each  Ankle Pumps: x 10  Comments: UE ROM with OT                AM-PAC Score  AM-PAC Inpatient Mobility Raw Score : 6 (08/13/20 1226)  AM-PAC Inpatient T-Scale Score : 23.55 (08/13/20 1226)  Mobility Inpatient CMS 0-100% Score: 100 (08/13/20 1226)  Mobility Inpatient CMS G-Code Modifier : CN (08/13/20 1226)          Goals  Short term goals  Time Frame for Short term goals: discharge  ongoing 8/13  Short term goal 1: sit to/from supine supervision  ongoing  Short term goal 2: sit to/from stand supervision  ongoing  Short term goal 3: independent B LE HEP x10 reps  ongoing  Patient Goals   Patient goals : did not state    Plan    Plan  Times per week: 2-5  Current Treatment Recommendations: Transfer Training, Endurance Training, Functional Mobility Training, Balance Training  Safety Devices  Type of devices: Nurse notified, Call light within reach, Left in bed, Bed alarm in place     Therapy Time   Individual Concurrent Group Co-treatment   Time In 1050         Time Out 1114         Minutes 24              Timed Code Treatment Minutes:   24    Total Treatment Minutes:  [address], [id number]

## 2020-08-13 NOTE — PLAN OF CARE
Problem: Sensory:  Goal: Expressions of feelings of enhanced comfort will increase  Description: Expressions of feelings of enhanced comfort will increase. Patient medicated per [facility] with comfort care medications with therapeutic results. Will continue to monitor and reassess. Outcome: Ongoing     Problem: Coping:  Goal: Family's ability to cope with current situation will improve  Description: Family's ability to cope with current situation will improve. Patients family educate on end of life care and dying process. Family consoled.    Outcome: Ongoing     Problem: Physical Regulation:  Goal: Complications related to the disease process, condition or treatment will be avoided or minimized  Description: Complications related to the disease process, condition or treatment will be avoided or minimized  Outcome: Ongoing     Problem: Respiratory:  Goal: Verbalizations of increased ease of respirations will increase  Description: Verbalizations of increased ease of respirations will increase  Outcome: Ongoing

## 2020-08-13 NOTE — PROGRESS NOTES
Patient extubated, patient administered comfort care medications per [facility]. Children at bedside, educated on comfort care measures.

## 2020-08-13 NOTE — ANESTHESIA POSTPROCEDURE EVALUATION
Department of Anesthesiology  Postprocedure Note    Patient: Baljeet Robison  MRN: [id number]  YOB: 1922  Date of evaluation: 8/13/2020  Time:  3:45 PM     Procedure Summary     Date:  08/13/20 Room / Location:  [address] [place] [address]    Anesthesia Start:  2630 Anesthesia Stop:  5034    Procedure:  CYSTOSCOPY, CLOT EVACUATION,  FULGERATION, BLADDER BIOPSY (N/A ) Diagnosis:       Gross hematuria      (Gross hematuria [R31.0])    Surgeon:  Ky Nolasco MD Responsible Provider:  Julieta Solis MD    Anesthesia Type:  general ASA Status:  3          Anesthesia Type: general    Joyce Phase I: Joyce Score: 9    Joyce Phase II:      Last vitals: Reviewed and per EMR flowsheets. Anesthesia Post Evaluation    Patient location during evaluation: PACU  Patient participation: complete - patient participated  Level of consciousness: agitated  Pain score: 8  Airway patency: stridor  Nausea & Vomiting: no nausea and no vomiting  Complications: no  Cardiovascular status: blood pressure returned to baseline and hemodynamically stable  Respiratory status: unstable and ventilator  Hydration status: euvolemic  Comments: Expanding abd / states trouble breathing . Decision to intubate patient in pacu - prior to heading to CT/ [facility] . Surgeon / family notified .  Intubation with in line stabilization / glidescope - neck neutral

## 2020-08-13 NOTE — ANESTHESIA PRE PROCEDURE
Department of Anesthesiology  Preprocedure Note       Name:  Michele Dubin   Age:  80 y.o.  :  3/12/1922                                          MRN:  [id number]         Date:  2020      Surgeon: Italia Kunz):  Krzysztof Simmons MD    Procedure: Procedure(s):  CYSTOSCOPY/ CLOT EVACUATION/ POSSIBLE FULGERATION/ POSSIBLE BLADDER BIOPSY    Medications prior to admission:   Prior to Admission medications    Medication Sig Start Date End Date Taking? Authorizing Provider   FLUZONE QUADRIVALENT 0.5 ML injection TO BE ADMINISTERED BY PHARMACIST FOR IMMUNIZATION 10/30/18   Historical Provider, MD   triamcinolone (KENALOG) 0.1 % cream Apply topically 2 times daily.  10/19/18   Saroj Lam MD   potassium chloride (KLOR-CON M) 20 MEQ extended release tablet Take 0.5 tablets by mouth daily 10/19/18   Saroj Lam MD   latanoprost (XALATAN) 0.005 % ophthalmic solution Place 1 drop into both eyes nightly    Historical Provider, MD   lisinopril (PRINIVIL;ZESTRIL) 2.5 MG tablet Take 2.5 mg by mouth daily    Historical Provider, MD   metoprolol tartrate (LOPRESSOR) 25 MG tablet Take 25 mg by mouth 2 times daily    Historical Provider, MD   calcium elemental (OYSCO 500) 500 MG TABS tablet Take 500 mg by mouth 2 times daily     Historical Provider, MD   solifenacin (VESICARE) 5 MG tablet Take 5 mg by mouth daily    Historical Provider, MD   vitamin D (CHOLECALCIFEROL) 1000 UNIT TABS tablet Take 2,000 Units by mouth daily    Historical Provider, MD   hydrALAZINE (APRESOLINE) 10 MG tablet Take 10 mg by mouth 2 times daily as needed    Historical Provider, MD   polyethylene glycol (GLYCOLAX) powder Take 17 g by mouth 2 times daily as needed    Historical Provider, MD   senna-docusate (PERICOLACE) 8.6-50 MG per tablet Take 1 tablet by mouth 2 times daily as needed for Constipation    Historical Provider, MD   loratadine (CLARITIN) 10 MG tablet Take 10 mg by mouth daily    Historical Provider, MD   aspirin 325 MG EC tablet Take 1 tablet by mouth daily 10/17/18 11/16/18  CELIA Burton CNP   dorzolamide-timolol (COSOPT) 22.3-6.8 MG/ML ophthalmic solution Place 1 drop into both eyes every 12 hours    Historical Provider, MD   Flax OIL Take by mouth daily    Historical Provider, MD   acetaminophen (AMINOFEN) 325 MG tablet Take 2 tablets by mouth every 6 hours as needed for Pain  Patient taking differently: Take 650 mg by mouth 2 times daily  4/18/16   CELIA Pennington CNP   ranitidine (ZANTAC) 150 MG tablet TAKE ONE TABLET BY MOUTH TWICE A DAY 1/26/16   Janet Mcintyre MD   levothyroxine (SYNTHROID) 125 MCG tablet TAKE ONE TABLET BY MOUTH DAILY 1/26/16   Janet Mcintyre MD   cyanocobalamin 1000 MCG/ML injection INJECT 1 ML ONCE EVERY MONTH 12/7/15   Janet Mcintyre MD   Compression Stockings MISC by Does not apply route 20-30 mmHg -- knee high 9/28/15   Janet Mcintyre MD   SYRINGE-NEEDLE, DISP, 3 ML (B-D 3CC LUER-ENEDINA SYR 25GX5/8\") 25G X 5/8\" 3 ML MISC 1 mL by Alternating Nares route every 30 days 8/14/15   Janet Mcintyre MD   Needles & Syringes MISC by Does not apply route.     Historical Provider, MD       Current medications:    Current Facility-Administered Medications   Medication Dose Route Frequency Provider Last Rate Last Dose    acetaminophen (TYLENOL) tablet 650 mg  650 mg Oral Q6H PRN Ana Ramon MD   650 mg at 08/08/20 1103    Or    acetaminophen (TYLENOL) suppository 650 mg  650 mg Rectal Q6H PRN Ana Ramon MD   650 mg at 08/11/20 0423    [Held by provider] heparin (porcine) injection 5,000 Units  5,000 Units Subcutaneous 3 times per day Ana Ramon MD   5,000 Units at 08/09/20 0626    levothyroxine (SYNTHROID) tablet 125 mcg  125 mcg Oral Daily Ana Ramon MD   125 mcg at 08/10/20 0826    sodium chloride flush 0.9 % injection 10 mL  10 mL Intravenous 2 times per day Ana Ramon MD   10 mL at 08/12/20 2115    sodium chloride flush 0.9 % injection 10 mL  10 mL Intravenous PRN Ruth Grave Miesha Lin MD   10 mL at 08/04/20 0514    polyethylene glycol (GLYCOLAX) packet 17 g  17 g Oral Daily PRN Sherry Thomas MD        promethazine (PHENERGAN) tablet 12.5 mg  12.5 mg Oral Q6H PRN Sherry Thomas MD        Or    ondansetron [facility]) injection 4 mg  4 mg Intravenous Q6H PRN Sherry Thomas MD   4 mg at 08/05/20 0057    [Held by provider] magnesium sulfate 2 g in 50 mL IVPB premix  2 g Intravenous PRN Sherry Thomas MD        insulin lispro (1 Unit Dial) 0-6 Units  0-6 Units Subcutaneous TID [facility] Sherry Thomas MD   Stopped at 08/10/20 1223    insulin lispro (1 Unit Dial) 0-3 Units  0-3 Units Subcutaneous Nightly Sherry Thomas MD   1 Units at 08/04/20 2155    glucose (GLUTOSE) 40 % oral gel 15 g  15 g Oral PRN Sherry Thomas MD        dextrose 50 % IV solution  12.5 g Intravenous PRN Sherry Thomas MD   12.5 g at 08/03/20 0930    glucagon (rDNA) injection 1 mg  1 mg Intramuscular PRN Sherry Thomas MD        dextrose 5 % solution  100 mL/hr Intravenous PRN Sherry Thomas MD        perflutren lipid microspheres (DEFINITY) injection 1.65 mg  1.5 mL Intravenous ONCE PRN Sherry Thomas MD           Allergies:     Allergies   Allergen Reactions    Bee Venom        Problem List:    Patient Active Problem List   Diagnosis Code    Esophageal reflux K21.9    Hypothyroidism E03.9    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    Cataract H26.9    Allergic rhinitis J30.9    Pernicious anemia D51.0    Menopausal symptoms N95.1    Onychomycosis B35.1    Elevated blood pressure reading R03.0    Leukopenia D72.819    Edema R60.9    Shoulder pain, bilateral M25.511, M25.512    Pain in both feet M79.671, M79.672    Heart murmur R01.1    Impacted cerumen of both ears H61.23    Skin lesion L98.9    Closed bimalleolar fracture of right ankle S82.841A    Bimalleolar ankle fracture, left, closed, initial encounter G17.939V    Acute metabolic encephalopathy Y18.31    COVID-19 U07.1       Past Medical History:        Diagnosis Date    Carcinoma in situ of colon 1998    Diarrhea     Esophageal reflux     Other extrapyramidal disease and abnormal movement disorder     Pernicious anemia     S/P colonoscopy 4/09    OK - no further eval.    Unspecified cataract     Unspecified hypothyroidism     Unspecified pruritic disorder        Past Surgical History:        Procedure Laterality Date    COLECTOMY      partial    COLONOSCOPY  4/09    OK - no further eval    HYSTERECTOMY      UPPER GASTROINTESTINAL ENDOSCOPY N/A 8/12/2020    EGD ESOPHAGOGASTRODUODENOSCOPY PEG TUBE INSERTION performed by Jason Mckinley MD at [address] [facility] History:    Social History     Tobacco Use    Smoking status: Never Smoker    Smokeless tobacco: Never Used   Substance Use Topics    Alcohol use: No                                Counseling given: Not Answered      Vital Signs (Current):   Vitals:    08/12/20 1734 08/12/20 2033 08/12/20 2343 08/13/20 0256   BP: (!) 159/89 130/84 118/74 131/73   Pulse: 103 108 109 107   Resp: 16 16 16 16   Temp: 96.7 °F (35.9 °C) 97.5 °F (36.4 °C) 97.6 °F (36.4 °C) 97.5 °F (36.4 °C)   TempSrc: Oral Oral Oral Oral   SpO2: 99% 94% 98% 94%   Weight:       Height:                                                  BP Readings from Last 3 Encounters:   08/13/20 131/73   08/12/20 114/84   08/03/20 (!) 110/53       NPO Status: Time of last liquid consumption: 0800                        Time of last solid consumption: 0800                        Date of last liquid consumption: (jessica)                        Date of last solid food consumption: (jessica)    BMI:   Wt Readings from Last 3 Encounters:   08/05/20 157 lb 6.5 oz (71.4 kg)   08/03/20 155 lb (70.3 kg)   01/28/19 169 lb 5 oz (76.8 kg)     Body mass index is 28.79 kg/m².     CBC:   Lab Results   Component Value Date    WBC 7.3 08/13/2020    RBC 2.97 08/13/2020    HGB 8.6 08/13/2020    HCT 25.6 08/13/2020    MCV 85.9 08/13/2020    RDW 18.5 08/13/2020     08/13/2020       CMP:   Lab Results   Component Value Date     08/13/2020    K 4.7 08/13/2020     08/13/2020    CO2 21 08/13/2020    BUN 44 08/13/2020    CREATININE 1.6 08/13/2020    GFRAA 36 08/13/2020    GFRAA >60 01/05/2012    AGRATIO 0.9 08/13/2020    LABGLOM 30 08/13/2020    GLUCOSE 106 08/13/2020    GLUCOSE 92 07/09/2011    PROT 5.7 08/13/2020    PROT 7.3 01/05/2012    CALCIUM 8.3 08/13/2020    BILITOT 0.4 08/13/2020    ALKPHOS 69 08/13/2020    AST 20 08/13/2020    ALT 9 08/13/2020       POC Tests:   Recent Labs     08/13/20  3659   POCGLU 108*       Coags:   Lab Results   Component Value Date    PROTIME 12.8 08/11/2020    INR 1.10 08/11/2020    APTT 38.7 08/07/2020       HCG (If Applicable): No results found for: PREGTESTUR, PREGSERUM, HCG, HCGQUANT     ABGs: No results found for: PHART, PO2ART, DYY3XNH, MDB2QOU, BEART, U5SVWJZT     Type & Screen (If Applicable):  No results found for: LABABO, LABRH    Drug/Infectious Status (If Applicable):  No results found for: HIV, HEPCAB    COVID-19 Screening (If Applicable):   Lab Results   Component Value Date    COVID19 Not Detected 08/07/2020    COVID19 DETECTED 08/03/2020         Anesthesia Evaluation  Patient summary reviewed no history of anesthetic complications:   Airway: Mallampati: III  TM distance: >3 FB   Neck ROM: full  Mouth opening: > = 3 FB Dental: normal exam         Pulmonary:Negative Pulmonary ROS                              Cardiovascular:                      Neuro/Psych:                ROS comment: Extrapyramidal movement d/o   Doesn't communicate well  GI/Hepatic/Renal:   (+) GERD:,          ROS comment: 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    Endo/Other:    (+) hypothyroidism::., .                  ROS comment: anemai Abdominal:           Vascular:                                        Anesthesia Plan      general     ASA 3     (Pt doesn't communicate well)  Induction: intravenous. Anesthetic plan and risks discussed with patient.                       Sean Muir MD   8/13/2020

## 2020-08-13 NOTE — PROGRESS NOTES
Occupational Therapy  Facility/Department: [facility] GENERAL SURGERY  Daily Treatment Note  NAME: Douglas Nunez  : 3/12/1922  MRN: [id number]    Date of Service: 2020    Discharge Recommendations:    Douglas Nunez scored a 24 on the AM-PAC ADL Inpatient form. Current research shows that an AM-PAC score of 17 or less is typically not associated with a discharge to the patient's home setting. Based on the patient's AM-PAC score and their current ADL deficits, it is recommended that the patient have 3-5 sessions per week of Occupational Therapy at d/c to increase the patient's independence. Please see assessment section for further patient specific details. If patient discharges prior to next session this note will serve as a discharge summary. Please see below for the latest assessment towards goals. OT Equipment Recommendations  Other: defer to next setting    Assessment   Performance deficits / Impairments: Decreased endurance;Decreased functional mobility ; Decreased ADL status; Decreased posture;Decreased strength;Decreased cognition  Assessment: Pt is limited by fatigue, Craig, cognition and participated in cotx therapy this date EOB. Pt sat EOB ~10 min varying from CGA-Mod A. Pt is Max A x2 for bed mobility. Pt following simple commands. Pt is not safe to d/c home to [place]. Pt would benefit from continued inpt therapy at d/c to return to PLOF. Continue with POC. Treatment Diagnosis: Impaired ADLs, mobility, activity tolerance  OT Education: OT Role(bed mobility, importance of EOB activity)  Patient Education: continue to reinforce  Barriers to Learning: very [place],  REQUIRES OT FOLLOW UP: Yes  Activity Tolerance  Activity Tolerance: Patient limited by fatigue;Patient Tolerated treatment well  Safety Devices  Safety Devices in place: Yes  Type of devices: Left in bed;Bed alarm in place;Call light within reach;Nurse notified; All fall risk precautions in place         Patient Diagnosis(es): There were no encounter diagnoses. has a past medical history of Carcinoma in situ of colon, Diarrhea, Esophageal reflux, Other extrapyramidal disease and abnormal movement disorder, Pernicious anemia, S/P colonoscopy, Unspecified cataract, Unspecified hypothyroidism, and Unspecified pruritic disorder. has a past surgical history that includes colectomy; Colonoscopy (4/09); Hysterectomy; and Upper gastrointestinal endoscopy (N/A, 8/12/2020). Restrictions  Position Activity Restriction  Other position/activity restrictions: up as tolerated, COVID positive-airborne precautions, [place] at all times (except when eating, bathing or showering)  Subjective   General  Chart Reviewed: Yes  Patient assessed for rehabilitation services?: Yes  Additional Pertinent Hx: 80 y.o. F adm 8/3 after a fall. Imaging revealed traumatic comminuted and displaced left occipital condyle and C1 left lateral mass fractures with extension to the foramen transversarium. Neurosurgery consulted, rec conservative management. Response to previous treatment: Patient with no complaints from previous session  Family / Caregiver Present: Yes(Daughter at EOS)  Diagnosis: Acute metabolic encephalopathy  Subjective  Subjective: Pt supine in bed with [place] donned. Pt agreeable to therapy session after initially declining EOB activity. Pt pleasant, very [place] (daughter voiced concern over redness in left ear and RN contacted). Pt verbalized 1/2 word responses and short phrases during therapy session along with hand gestures. General Comment  Comments: Pt supine to sit Max A x 2. Pt sit EOB ~12 min  varying from CGA-Mod A. Pt combed hair EOB with setup and Min A support. Pt also EOB with LE (with PT) and UB ROM exercises: ~6 horizontal AB/Adduction with AAROM x 2 sets, ~6 cross midline RUE/LUE x 2 sets each, clapping own hands and clapping hands with this writer. Pt sit to supine Max A x 2. Wilfred light in reach and bed alarm on.   Pain Assessment  Pain Type: Surgical pain  Pain Location: Abdomen  Pain Descriptors: Sore  Pre Treatment Pain Screening  Intervention List: Patient able to continue with treatment;Nurse/Physician notified  Vital Signs  Patient Currently in Pain: Yes(\"A little bit\")   Orientation     Objective             Balance  Sitting Balance: Moderate assistance(pt varying throughout therapy session CGA - Mod A)  Bed mobility  Supine to Sit: 2 Person assistance(Max A x 2)  Sit to Supine: 2 Person assistance(Max A x 2)  Scooting: Dependent/Total;2 Person assistance(to scoot to HOB supine)                          Cognition  Overall Cognitive Status: Exceptions  Arousal/Alertness: Delayed responses to stimuli  Following Commands: Follows one step commands with increased time  Insights: Decreased awareness of deficits  Initiation: Requires cues for all  Cognition Comment: Pt responded approriately to one step commands, asked us to speak into her left ear. Also better once wearing glasses.                                          Plan   Plan  Times per week: 2-5x  Times per day: Daily  G-Code     OutComes Score                                                  AM-PAC Score        AM-[facility] Inpatient Daily Activity Raw Score: 8 (08/13/20 1240)  AM-PAC Inpatient ADL T-Scale Score : 22.86 (08/13/20 1240)  ADL Inpatient CMS 0-100% Score: 85.69 (08/13/20 1240)  ADL Inpatient CMS G-Code Modifier : CM (08/13/20 1240)    Goals  Short term goals  Time Frame for Short term goals: By d/c  Short term goal 1: Supine <> sit with Min A x2 - not met 8/13  Short term goal 2: Sitting at EOB to complete ADL task with setup - pt combed hair setup with Min A to sit EOB goal not met 8/13  Short term goal 3: Sit-stand transfer with Min A x2 - not met, not attempted due to safety 8/13  Patient Goals   Patient goals : Not stated       Therapy Time   Individual Concurrent Group Co-treatment   Time In 1050         Time Out 1114         Minutes 24         Timed Code Treatment Minutes: 24 [facility], [address] [place] [address]

## 2020-08-13 NOTE — PROGRESS NOTES
Family at bedside. Father Yvrose Kim just showed up at bedside. Patient more awake now, transitioning onto comfort care medications and measures. Patient remains on vent at this time. Planning for terminal extubation this evening, all children in agreement. [facility] to be called.

## 2020-08-13 NOTE — PROGRESS NOTES
Patient alert to self. Patient turned Q2 and on specialty mattress. Neck collar in place. Urine in the CBI is pink and clear. Will continue to monitor.

## 2020-08-13 NOTE — PROGRESS NOTES
Patient is alert and oriented only to self. Patient has had low tachycardia in 100-110. Patient other VSS and afebrile. Patient has stated no pain and shows no signs of pain. Tube feeding was stopped at 0000. Patient has since had no intake. CBI is running and has been putting out pink urine output. Patient has bed in lowest position and call light within reach. Will continue to monitor.

## 2020-08-13 NOTE — PROGRESS NOTES
Meropenem 1g IV q8 hours ordered for patient. This medication is renally eliminated. Will change to 500 mg IV q12 hours per renal dose adjustment policy. Estimated Creatinine Clearance: 18 mL/min (A) (based on SCr of 1.6 mg/dL (H)). Pharmacy will continue to monitor renal function and adjust dose as necessary. Please call with any questions.     Thanks  Natividad Robbins, [address] 8/13/2020, 3:45 PM

## 2020-08-13 NOTE — PLAN OF CARE
Problem: Skin Integrity:  Goal: Will show no infection signs and symptoms  Description: Will show no infection signs and symptoms  Outcome: Ongoing  Note: Patient shows no sign of new skin breakdown. Patient VSS and afebrile. Will continue to monitor. Problem: Confusion - Acute:  Goal: Absence of continued neurological deterioration signs and symptoms  Description: Absence of continued neurological deterioration signs and symptoms  Outcome: Ongoing  Note: Patient has been able to make needs known. Patient shows no signs of new neurological worsening. Will continue to monitor.

## 2020-08-13 NOTE — PROGRESS NOTES
Hospitalist Progress Note      PCP: Benny Best MD    Date of Admission: 8/3/2020    Chief Complaint: Fall, [facility] Course: Patient is 51-year-old female resident of assisted living history of hypothyroid, hypertension presented to [facility] ED after she had a fall. On my evaluation patient is confused so most of the history was obtained from the charts and talking to family over the phone [facility] and [facility]. Per ED notes  Patient was last seen at 10:00 by the assisted living nurse. She was sitting in a chair when they came back to see her at 12 midnight they found her on the floor. Complain of left hip and knee pain.     On arrival to ED hemodynamically stable. Blood work significant for creatinine 5.3, CO2 11, potassium 6.4 troponin 0 0.22. EKG normal sinus rhythm with some PACs.     Imaging including CT head, cervical spine small right frontal scalp contusion. Comminuted displaced left occipital condyle and C1 lateral mass fracture. Prominence of the CSF space along the right convexity may related to patient positioning as opposed to subdural hygroma. Age-related cerebral volume loss. X-ray left knee no fracture or malalignment. X-ray hip no evidence of acute finding in the pelvis. Subjective:   Very Hard of hearing.   This morning sitting at the edge of the bed  No chest pain shortness of breath  Pain at the site of the PEG tube insertion  No nausea vomiting  Tube feeds have been held for cystoscopy  Medications:  Reviewed    Infusion Medications    dextrose       Scheduled Medications    [Held by provider] heparin (porcine)  5,000 Units Subcutaneous 3 times per day    levothyroxine  125 mcg Oral Daily    sodium chloride flush  10 mL Intravenous 2 times per day    insulin lispro  0-6 Units Subcutaneous TID WC    insulin lispro  0-3 Units Subcutaneous Nightly     PRN Meds: acetaminophen **OR** acetaminophen, sodium chloride flush, polyethylene glycol, promethazine **OR** 08/03/2020    WBCUA 335 08/03/2020    BACTERIA Rare 01/07/2020    RBCUA 77 08/03/2020    BLOODU LARGE 08/03/2020    SPECGRAV 1.013 08/03/2020    GLUCOSEU Negative 08/03/2020    GLUCOSEU NEGATIVE 05/17/2010       Radiology:  XR HIP 2-3 VW W PELVIS LEFT   Final Result      No evidence of acute osseous abnormality. If the patient is nonweightbearing or there is further clinical suspicion for hip fracture, CT would be more sensitive. FL MODIFIED BARIUM SWALLOW W VIDEO   Final Result      Deep penetration/aspiration observed with essentially all substances examined as described above. Please see speech therapy report for further comments and recommendations. XR ABDOMEN (KUB) (SINGLE AP VIEW)   Final Result      NG tube advanced from the GE junction with tip overlying the expected position of the distal stomach. XR ABDOMEN (KUB) (SINGLE AP VIEW)   Final Result   Impression: Nasogastric tube is coiled within the distal esophagus. MRA NECK WO CONTRAST   Final Result   1. No evidence of acute infarct or other acute intracranial abnormality. 2. Moderate global volume loss with moderate chronic small vessel ischemic disease within the periventricular white matter. MRA NECK:      FINDINGS: The vertebral arteries are patent bilaterally and equal in size. No evidence of vertebral artery occlusion. Both vertebral arteries contribute to the basilar artery. The common carotid arteries are patent bilaterally. The internal and external carotid arteries are patent bilaterally. There is no high-grade stenosis. There is no evidence of vessel occlusion. IMPRESSION:   1. Patent carotid and vertebral arteries. No evidence of acute dissection or occlusion. MRI BRAIN WO CONTRAST   Final Result   1. No evidence of acute infarct or other acute intracranial abnormality.    2. Moderate global volume loss with moderate chronic small vessel ischemic disease within the periventricular white matter. MRA NECK:      FINDINGS: The vertebral arteries are patent bilaterally and equal in size. No evidence of vertebral artery occlusion. Both vertebral arteries contribute to the basilar artery. The common carotid arteries are patent bilaterally. The internal and external carotid arteries are patent bilaterally. There is no high-grade stenosis. There is no evidence of vessel occlusion. IMPRESSION:   1. Patent carotid and vertebral arteries. No evidence of acute dissection or occlusion. CT ABDOMEN PELVIS WO CONTRAST Additional Contrast? None   Final Result      1. Mild bilateral hydronephrosis with ureters dilated to the level of the urinary bladder of uncertain etiology. 2. Collapsed urinary bladder with Cha catheter in place. There is suggestion of diffuse bladder wall thickening. Further evaluation with cystoscopy may be indicated. 3. Moderate age-indeterminate compression fracture of the L1 vertebral body. US RENAL COMPLETE   Final Result      Mild left hydronephrosis and mild right hydroureter. No ultrasound evidence of a renal calculus. If the patient is symptomatic for renal colic, need for further evaluation with CT abdomen can be determined clinically. Assessment/Plan:    Active Hospital Problems    Diagnosis Date Noted    COVID-19 [U07.1] 08/04/2020    Acute metabolic encephalopathy [K27.22] 08/03/2020       80year-old lady admitted to the hospital after a fall    COVID-19 pneumonia  improved   ID signed off  Patient tested negative twice and out of precautions. Acute metabolic encephalopathy:   improved and interactive  Multifactorial UTI, renal failure    JUANCARLOS multifactorial prerenal and postrenal  PVR was reported to be 900 cc  Renal ultrasound mild left hydronephrosis and mild right hydroureter.     Seen by urology  Hematuria has cleared up  Creatinine is stable  Urology following plans to do a cystoscopy today        UTI with Klebsiella  Completed 6 day course of IV abx     Hematuria of unclear etiology. Continue CBI per urology  Hematuria has cleared up  Discussed with urology the plan is to do cystoscopytoday since bleeding intermittent  But persist. Family understands its high risk       Elevated troponin in setting of JUANCARLOS. Troponin trending down. Echocardiogram shows ejection fraction of 40 to 45% with moderate to severe aortic stenosis    New onset mild systolic heart failure with moderate to severe aortic stenosis  Currently seems euvolemic      Fall unsure mechanical versus other reason. Follow-up on echocardiogram.  MRA and MRI reviewed no aneurysm or stroke. Traumatic comminuted and displaced left occipital condyle and C1 left lateral mass fracture  Neurosurgery consult reviewed and appreciated  [place] J collar to be worn at all times  Cervical collar does NOT need to be worn when eating, bathing or showering  Cervical collar pads to be cleaned with soap & water, air dried, and changed daily   looks comfortable currently on as needed Tylenol    Dysphagia  Multifactorial exacerbated by her cervical collar  Per family request they want to move go ahead with the PEG tube insertion  post PEG tube will advance tube feeds to goal    Left hip pain:  Xray  did not show any fractures      DVT Prophylaxis: SCD only for now given persistent hematuria have held off on the subcutaneous heparin  Diet: Dietary Nutrition Supplements: Protein Modular  Diet NPO, After Midnight Exceptions are:  Other (See Comment)  Code Status: Limited    PT/OT Eval Status: Pipo Camp MD

## 2020-08-13 NOTE — PROGRESS NOTES
Hospitalist Progress Note      PCP: Felicita Webb MD    Date of Admission: 8/3/2020    Chief Complaints bladder perforation  Hospital Course:   Events during cystoscopy noted  Patient currently intubated ventilated     Medications:  Reviewed      Exam:    BP (!) 200/132   Pulse 96   Temp 97 °F (36.1 °C) (Temporal)   Resp 18   Ht 5' 2\" (1.575 m)   Wt 157 lb 6.5 oz (71.4 kg)   SpO2 100%   BMI 28.79 kg/m²     General appearance: Still drowsy  HEENT: Pupils equal, round, and reactive to light. Conjunctivae/corneas clear. Neck: Supple, with full range of motion. No jugular venous distention. Trachea midline. Respiratory:  Normal respiratory effort. Clear to auscultation, bilaterally without RALES/WHEEZES/Rhonchi. Cardiovascular: Regular rate and rhythm with normal S1/S2 without MURMURS, rubs or gallops. Abdomen: Soft, non-tender, non-distended with normal bowel sounds. Musculoskeletal: No clubbing, cyanosis or EDEMA bilaterally. Full range of motion without deformity. Skin: Skin color, texture, turgor normal.  No rashes or lesions. Neurologic: Drowsy pupils equal reacting to light      Labs:   Recent Labs     08/11/20 0449 08/12/20 0449 08/13/20  0510   WBC 6.3 5.2 7.3   HGB 8.7* 8.3* 8.6*   HCT 25.6* 24.6* 25.6*    158 187     Recent Labs     08/11/20 0449 08/12/20 0449 08/13/20  0510    137 138   K 5.4*  5.4* 5.0 4.7  4.7    105 107   CO2 23 23 21   BUN 45* 44* 44*   CREATININE 1.5* 1.7* 1.6*   CALCIUM 8.4 8.5 8.3     Recent Labs     08/11/20 0449 08/12/20 0449 08/13/20  0510   AST 22 23 20   ALT 11 11 9*   BILITOT 0.3 0.3 0.4   ALKPHOS 78 65 69     Recent Labs     08/11/20  1525   INR 1.10     No results for input(s): CKTOTAL, TROPONINI in the last 72 hours.     Urinalysis:      Lab Results   Component Value Date    NITRU Negative 08/03/2020    WBCUA 335 08/03/2020    BACTERIA Rare 01/07/2020    RBCUA 77 08/03/2020    BLOODU LARGE 08/03/2020    SPECGRAV 1.013 08/03/2020 GLUCOSEU Negative 08/03/2020    GLUCOSEU NEGATIVE 05/17/2010       Radiology:  CT CYSTOGRAM W CONTRAST   Final Result   Impression: Findings confirmatory of bladder rupture. A defect is noted anteriorly within the urinary bladder. Rupture is considered extraperitoneal/retroperitoneal, as fluid and gas extends into the potential space anteriorly between the peritoneum and    anterior abdominal wall. Findings were discussed with Dr. Liset Heatr at 1545 hours. Moderate volume ascites is noted. This is new from the prior study. The ascitic fluid is low density and does not mix with the extravasated contrast from the urinary bladder. Gas noted within the abdomen is favored to be within the preperitoneal space. Intraperitoneal gas is felt less likely, however this may also be a consideration status post recent PEG placement. Serial exam and repeat imaging may be beneficial.       XR HIP 2-3 VW W PELVIS LEFT   Final Result      No evidence of acute osseous abnormality. If the patient is nonweightbearing or there is further clinical suspicion for hip fracture, CT would be more sensitive. FL MODIFIED BARIUM SWALLOW W VIDEO   Final Result      Deep penetration/aspiration observed with essentially all substances examined as described above. Please see speech therapy report for further comments and recommendations. XR ABDOMEN (KUB) (SINGLE AP VIEW)   Final Result      NG tube advanced from the GE junction with tip overlying the expected position of the distal stomach. XR ABDOMEN (KUB) (SINGLE AP VIEW)   Final Result   Impression: Nasogastric tube is coiled within the distal esophagus. MRA NECK WO CONTRAST   Final Result   1. No evidence of acute infarct or other acute intracranial abnormality. 2. Moderate global volume loss with moderate chronic small vessel ischemic disease within the periventricular white matter.       MRA NECK:      FINDINGS: The vertebral arteries are patent Problems    Diagnosis Date Noted    Hematuria [R31.9] 08/13/2020    COVID-19 [U07.1] 08/04/2020    Acute metabolic encephalopathy [M43.66] 08/03/2020       Acute Medical Issues Being Addressed:    80year-old    Acute respiratory failure secondary to status post procedure    Acute bladder perforation with multiple blood clots and possible underlying suspected bladder cancer  The perforation is free peritoneal and retroperitoneal  Started on vancomycin and meropenem  Discussed with urology  Overall prognosis is extremely poor  She is not a surgical candidate    I spoke to the son Pernell Monroe and the daughter who are at the bedside  I explained all of the above issues  There is also spoken to urology  They understand the terminal nature of this issue  I offered to option 1 would be continuing medical care with antibiotics with high risk of peritonitis  Alternatively given advanced age multiple comorbidities I offered comfort care.   I explained that at this point we would stop antibiotics and lab checks fully focus on comfort with PRN morphine and Ativan understanding the inevitability of the situation but will keep her as comfortable as possible  Both son and daughter  Want her to be comfortable  They wanted to be extubated and have opted for hospice and comfort care  Terminal extubation orders placed  Comfort care orders placed  Hospice orders placed  Can be transferred out of ICU        Dispo - once acute medical processes have resolved    Tariq Jeronimo MD

## 2020-08-13 NOTE — PROGRESS NOTES
Patient admitted to room 4525 from PACU. Olivia GRIJALVA at bedside and Rufus Romero from RT. Patient non responsive at this time even to painful stimuli. Bedside report received from [employer]. Vitals taken. Will continue to monitor, assess and admit.

## 2020-08-13 NOTE — PROGRESS NOTES
Advanced Care Planning Note. Purpose of Encounter: Advanced care planning in light of acute and chronic deteriorating medical conditions. Parties In Attendance: Patient Claudia Aguilar), Sadaf Guardado [facility]), Dr. Maico Peters (myself)    Decisional Capacity: No    Subjective: /family understand in this voluntary conversation that Claudia Aguilar continues to deteriorate and discuss what inteventions and plans patient would want implemented in light of the following diagnoses:  Bladder perforation    Objective: Pt has been having chronic decline in functional status with increased dependence on others for ADLs  Increased frequency of Hospitalizations. Likelihood of further hospitalizations with likelihood of escalation of medical interventions with the expectation of returning to a diminishing baseline level of functionality. Discussion highlights: I discussed with patient the ramifications of their acute and chronic medical problems- and the likely outcomes expected, both in terms of statistics, and as part of my experience seeing patients with similar conditions. We did discuss the meanings of the different possible code statuses- and we inquired which of these seem to line up with the patients current and previously expressed values. Goals of Care Determination: t/POA wishes to focus on comfort and expressed the desired to pursue hospice. Will addend code status to [facility]. Plan: Hospice consult. Discuss Hospice care with case management. Review medications to assure focus on symptom relief and comfort.     Time spent on Advanced care Plannin minutes    Maico Peters MD  2020 4:50 PM

## 2020-08-13 NOTE — CARE COORDINATION
Cm following, spoke with Nancy Gibson [phone] no percert yet, they need TF to goal.  Pt getting cysto today will need CBI weaned prior to DC, will need updated COVID prior to DC.   Electronically signed by Aicha Corona RN on 8/13/2020 at 12:10 PM  [phone]

## 2020-08-13 NOTE — PROGRESS NOTES
Spoke with Son, Tamera Escobar, and Daughter, Liu Garg, in person. They were both aware of the current situation per Dr. Clive Mark and confirmed that it would be their mother's wish to change goals of care to comfort and to pursue hospice care. Primary team already consulted hospice and place is to extubate. Comfort and support provided.     Electronically signed by Александр Sprague MD on 8/13/2020 at 5:47 PM

## 2020-08-14 VITALS
HEART RATE: 74 BPM | HEIGHT: 62 IN | RESPIRATION RATE: 14 BRPM | DIASTOLIC BLOOD PRESSURE: 46 MMHG | BODY MASS INDEX: 28.97 KG/M2 | OXYGEN SATURATION: 86 % | SYSTOLIC BLOOD PRESSURE: 81 MMHG | WEIGHT: 157.41 LBS | TEMPERATURE: 97 F

## 2020-08-14 PROCEDURE — 6360000002 HC RX W HCPCS: Performed by: INTERNAL MEDICINE

## 2020-08-14 RX ORDER — MORPHINE SULFATE 100 MG/5ML
10 SOLUTION ORAL
Qty: 1 BOTTLE | Refills: 0 | Status: SHIPPED | OUTPATIENT
Start: 2020-08-14 | End: 2020-08-17

## 2020-08-14 RX ORDER — SCOLOPAMINE TRANSDERMAL SYSTEM 1 MG/1
1 PATCH, EXTENDED RELEASE TRANSDERMAL
Qty: 30 PATCH | Refills: 0 | Status: SHIPPED | OUTPATIENT
Start: 2020-08-16

## 2020-08-14 RX ORDER — LORAZEPAM 2 MG/ML
1 CONCENTRATE ORAL EVERY 8 HOURS PRN
Qty: 1 BOTTLE | Refills: 0 | Status: SHIPPED | OUTPATIENT
Start: 2020-08-14 | End: 2020-08-28

## 2020-08-14 RX ADMIN — LORAZEPAM 2 MG: 2 INJECTION INTRAMUSCULAR; INTRAVENOUS at 07:11

## 2020-08-14 RX ADMIN — LORAZEPAM 2 MG: 2 INJECTION INTRAMUSCULAR; INTRAVENOUS at 16:21

## 2020-08-14 ASSESSMENT — PAIN SCALES - PAIN ASSESSMENT IN ADVANCED DEMENTIA (PAINAD)
NEGVOCALIZATION: 0
BODYLANGUAGE: 0
BREATHING: 0
FACIALEXPRESSION: 0
TOTALSCORE: 0
CONSOLABILITY: 0

## 2020-08-14 NOTE — PROGRESS NOTES
Thank you for this referral and the opportunity to assist with care for Mrs. Samson Avila and her family at this time. Met with son, Corinna Salgado, and his wife, to discuss [facility] philosophy, levels of care and services. Discussed [facility] for symptom management, which is what they have chosen. PTS to arrive at 16:00 to transport. Floor nurse, Fuentes Malagon, and Lois Alonso, aware of the plan. Any questions, please call [phone]. Thank you. Rupal Sharp RN, [facility] Liaison  [address]: [phone]  C: [address]  F: [phone]  Referrals and Information: [address] [place] [address]. [facility]  [email or web address]

## 2020-08-14 NOTE — PROGRESS NOTES
Nephrology Note                                                                                                                                                                                                                                                                                                                                                               Office : [phone]     Fax :[phone]      Patient's Name: Rik Nails  12:49 AM  8/14/2020    Reason for Consult:  Elevated Cr    History of Present Ilness:    Rik Nails is a 80 y.o. female with PMH significant for carcinoma in situ of colon, pernicious anemia, and hypothyroidism. She presented to [facility] ED because she fell and was found on the floor. She is complaining of left hip and knee pain, as well as pain at the back of her head but she denies loss of consciousness. Denies chest pain, abdominal pain, and numbness or tingling. Denies being on blood thinner. According to her son, she ambulates a little bit with a walker, but mainly uses a wheelchair. He reports that she has been a little more confused in the last week.       INTERVAL HISTORY    Feels same  Shortness of breath: No   UOP: Fair  Creat: stable   K better     Past Medical History:   Diagnosis Date    Carcinoma in situ of colon 1998    Diarrhea     Esophageal reflux     Other extrapyramidal disease and abnormal movement disorder     Pernicious anemia     S/P colonoscopy 4/09    OK - no further eval.    Unspecified cataract     Unspecified hypothyroidism     Unspecified pruritic disorder        Past Surgical History:   Procedure Laterality Date    COLECTOMY      partial    COLONOSCOPY  4/09    OK - no further eval    CYSTOSCOPY N/A 8/13/2020    CYSTOSCOPY, CLOT EVACUATION,  FULGERATION, BLADDER BIOPSY performed by Roxanne Best MD at [facility] N/A 8/12/2020    EGD ESOPHAGOGASTRODUODENOSCOPY PEG TUBE INSERTION performed by Arlene Bergeron MD at [facility] reviewed. No pertinent family history. reports that she has never smoked. She has never used smokeless tobacco. She reports that she does not drink alcohol or use drugs.     Allergies:  Bee venom    Current Medications:    fentaNYL (SUBLIMAZE) 100 MCG/2ML injection,   morphine (PF) injection 2 mg, Q2H PRN    Or  morphine injection 4 mg, Q2H PRN  LORazepam (ATIVAN) injection 2 mg, Q2H PRN  scopolamine (TRANSDERM-SCOP) transdermal patch 1 patch, Q72H  acetaminophen (TYLENOL) tablet 650 mg, Q6H PRN    Or  acetaminophen (TYLENOL) suppository 650 mg, Q6H PRN  sodium chloride flush 0.9 % injection 10 mL, PRN  promethazine (PHENERGAN) tablet 12.5 mg, Q6H PRN    Or  ondansetron (ZOFRAN) injection 4 mg, Q6H PRN  dextrose 50 % IV solution, PRN  glucagon (rDNA) injection 1 mg, PRN  dextrose 5 % solution, PRN  perflutren lipid microspheres (DEFINITY) injection 1.65 mg, ONCE PRN        Physical exam:     Vitals:  BP (!) 154/101   Pulse 79   Temp 97 °F (36.1 °C) (Temporal)   Resp 10   Ht 5' 2\" (1.575 m)   Wt 157 lb 6.5 oz (71.4 kg)   SpO2 96%   BMI 28.79 kg/m²   Constitutional:  resting, NAD  HEENT: DHT in place  Cardiovascular:  S1, S2 reg  Ext: no lower extremity edema  CNS:  no agitation   : Cha in place    Limited exam to preserve PPE    Data:   Labs:  CBC:   Recent Labs     08/11/20 0449 08/12/20 0449 08/13/20  0510   WBC 6.3 5.2 7.3   HGB 8.7* 8.3* 8.6*    158 187     BMP:    Recent Labs     08/11/20 0449 08/12/20 0449 08/13/20  0510    137 138   K 5.4*  5.4* 5.0 4.7  4.7    105 107   CO2 23 23 21   BUN 45* 44* 44*   CREATININE 1.5* 1.7* 1.6*   GLUCOSE 102* 94 106*     Ca/Mg/Phos:   Recent Labs     08/11/20 0449 08/12/20 0449 08/13/20  0510   CALCIUM 8.4 8.5 8.3   MG 2.30  --  2.20     Hepatic:   Recent Labs     08/11/20 0449 08/12/20 0449 08/13/20  0510   AST 22 23 20   ALT 11 11 9*   BILITOT 0.3 0.3 0.4   ALKPHOS 78 65 69     Troponin:   No results for input(s): TROPONINI in the last 72 hours. BNP: No results for input(s): BNP in the last 72 hours. Lipids: No results for input(s): CHOL, TRIG, HDL, LDLCALC, LABVLDL in the last 72 hours. ABGs: No results for input(s): PHART, PO2ART, HWW5WMF in the last 72 hours. INR:   Recent Labs     08/11/20  1525   INR 1.10     UA:  No results for input(s): COLORU, CLARITYU, GLUCOSEU, BILIRUBINUR, KETUA, SPECGRAV, BLOODU, PHUR, PROTEINU, UROBILINOGEN, NITRU, LEUKOCYTESUR, Neela Kenning in the last 72 hours. Urine Microscopic:   No results for input(s): LABCAST, BACTERIA, COMU, HYALCAST, WBCUA, RBCUA, EPIU in the last 72 hours. Urine Culture:   No results for input(s): LABURIN in the last 72 hours. Urine Chemistry:   No results for input(s): Alfornia Darlin, PROTEINUR, NAUR in the last 72 hours. IMAGING:  CT CYSTOGRAM W CONTRAST   Final Result   Impression: Findings confirmatory of bladder rupture. A defect is noted anteriorly within the urinary bladder. Rupture is considered extraperitoneal/retroperitoneal, as fluid and gas extends into the potential space anteriorly between the peritoneum and    anterior abdominal wall. Findings were discussed with Dr. Nash Melo at 1545 hours. Moderate volume ascites is noted. This is new from the prior study. The ascitic fluid is low density and does not mix with the extravasated contrast from the urinary bladder. Gas noted within the abdomen is favored to be within the preperitoneal space. Intraperitoneal gas is felt less likely, however this may also be a consideration status post recent PEG placement. Serial exam and repeat imaging may be beneficial.       XR HIP 2-3 VW W PELVIS LEFT   Final Result      No evidence of acute osseous abnormality. If the patient is nonweightbearing or there is further clinical suspicion for hip fracture, CT would be more sensitive.             FL MODIFIED BARIUM SWALLOW W acute dissection or occlusion. CT ABDOMEN PELVIS WO CONTRAST Additional Contrast? None   Final Result      1. Mild bilateral hydronephrosis with ureters dilated to the level of the urinary bladder of uncertain etiology. 2. Collapsed urinary bladder with Cha catheter in place. There is suggestion of diffuse bladder wall thickening. Further evaluation with cystoscopy may be indicated. 3. Moderate age-indeterminate compression fracture of the L1 vertebral body. US RENAL COMPLETE   Final Result      Mild left hydronephrosis and mild right hydroureter. No ultrasound evidence of a renal calculus. If the patient is symptomatic for renal colic, need for further evaluation with CT abdomen can be determined clinically. Assessment/Plan   1. JUANCARLOS - secondary to obstruction/urinary retention/hypovolemia   -  Cr continuing to improve   - on TF   - K was elevated - changed TF to low K TF     2. Anion gap metabolic acidosis  - resolved    3. Hypophosphatemia  - supplemented    4. Acute metabolic encephalopathy  - likely secondary to infection       5. Occipital condyle & C1 fracture  - follow neurosurgery recs      6. Bilateral hydronephrosis  -  likely secondary to urinary retention, status post Cha    7. Gross hematuria  - urology following,  - bladder irrigation  - cysto in future     8.  COVID 19 infection  - per hospitalist      Thank you for allowing us to participate in care of [address] [place] [address] free to contact me   [facility] of [address]  Office : [phone]  Fax :[phone]

## 2020-08-14 NOTE — PROGRESS NOTES
Physical Therapy and Occupational Therapy  Discharge    Pt transferred to ICU due to decreased medical status. Noted family's plan is to change goals of care to comfort and to pursue hospice care. Will sign off from PT/OT standpoint.     Lennox Mae, [place] Pres[place]illa Shorts OTR/L  [phone]

## 2020-08-14 NOTE — PROGRESS NOTES
Physician Progress Note      Tulio Hobson  CSN #:                  [id number]  :                       3/12/1922  ADMIT DATE:       8/3/2020 8:55 AM  [address] [place] DATE:  RESPONDING  PROVIDER #:        Jose Barroso MD          QUERY TEXT:    Patient admitted with Covid. Noted documentation of acute respiratory failure   in PN on . Please indicate one of the following and document in the   medical record: The medical record reflects the following:  Risk Factors: 79 yo intubated for Cysto, clot evacuation, fulgeration, bladder   biopsy  Clinical Indicators: Per PN : Acute respiratory failure secondary to   status post procedure. Treatment: Pt left intubated after procedure to discuss further care plan. Acute Respiratory Failure Clinical Indicators per  MS-DRG Training Guide and   Quick Reference Guide:  pO2 < 60 mmHg or SpO2 (pulse oximetry) < 91% breathing room air  pCO2 > 50 and pH < 7.35  P/F ratio (pO2 / FIO2) < 300  pO2 decrease or pCO2 increase by 10 mmHg from baseline (if known)  Supplemental oxygen of 40% or more  Presence of respiratory distress, tachypnea, dyspnea, shortness of breath,   wheezing  Unable to speak in complete sentences  Use of accessory muscles to breathe  Extreme anxiety and feeling of impending doom  Tripod position  Confusion/altered mental status/obtunded  Options provided:  -- Acute Respiratory Failure ruled out after study  -- Acute Respiratory Failure currently as evidenced by, Please document   evidence. -- Other - I will add my own diagnosis  -- Disagree - Not applicable / Not valid  -- Disagree - Clinically unable to determine / Unknown  -- Refer to Clinical Documentation Reviewer    PROVIDER RESPONSE TEXT:    Acute Respiratory Failure has been ruled out after study.     Query created by: Forest Pinedo on 2020 8:25 AM      Electronically signed by:  Jose Barroso MD 2020 9:39 AM

## 2020-08-14 NOTE — CARE COORDINATION
staff is available. (payment due at time of pick-up or delivery - cash, check, or card accepted)     Able to afford home medications/ co-pay costs: Yes    ADLS:  Current PT AM-PAC Score: 6 /24  Current OT AM-PAC Score: 8 /24      Discharge Disposition: Inpatient Hospice Unit: [facility].      LOC at discharge: Not Applicable  EZRA Completed: Not Indicated    Notification completed in HENS/PAS?:  Not Applicable    IMM Completed:   Not Indicated    Transportation:  Transportation Plan for discharge: EMS transportation   Mode of Transport: Ambulance stretcher - BLS    Reason for medical transport: Bed confined: Meets the following criteria 1) unable to get out of bed without assistance or ambulate, 2) unable to safely sit up in a wheelchair, 3) unable to maintain erect seating position in a chair for time needed for transport and Other: Hospice   Name of [address] [place] [address],[facility]: PTS  Phone:[phone]  Transport Time: 4:00 pm     Transportation form completed: Yes    Hospice Services:  Location: Inpatient Unit  Agency: [facility]  Phone: [phone]    Consents signed: Will be signed this afternoon. Referrals made at [facility] for outpatient continued care:  Not Applicable    Additional CM Notes:   HATTIE spoke with Glen Kitty, son, again at bedside and with Dave Reyes with [facility]. She will come up after lunch to meet with Glen Tang and sign consents. Looking for admissions around 3:30 pm to 4:00 pm. HATTIE called PTS at 10:30 am and was able to schedule 4:00 pm transport. HATTIE updated Glen Tang with tentative transport time. HATTIE notified Charge RN and attending of plan. HATTIE completed transport form.      The Plan for Transition of Care is related to the following treatment goals Acute metabolic encephalopathy [J07.17]  Hematuria [R31.9]      The Patient and/or patient representative Patient's son was provided with a choice of provider and agrees with the discharge plan Yes    Freedom of choice list was provided with basic dialogue that supports the patient's individualized plan of care/goals and shares the quality data associated with the providers.  Yes    Care Transitions patient: No    KAYLEEN Dickerson  [facility] - ICU   Case Management Department  Ph: [phone]

## 2020-08-14 NOTE — PROGRESS NOTES
Urology Attending Progress Note      Subjective: resting quietly NAD    Vitals:  BP (!) 81/46   Pulse 74   Temp 97 °F (36.1 °C) (Temporal)   Resp 14   Ht 5' 2\" (1.575 m)   Wt 157 lb 6.5 oz (71.4 kg)   SpO2 (!) 86%   BMI 28.79 kg/m²   Temp  Av °F (36.1 °C)  Min: 97 °F (36.1 °C)  Max: 97 °F (36.1 °C)    Intake/Output Summary (Last 24 hours) at 2020 1024  Last data filed at 2020 0800  Gross per 24 hour   Intake --   Output 1000 ml   Net -1000 ml       Exam: abd soft and non-tender. Cha draining clear pink tinged urine     Labs:  WBC:    Lab Results   Component Value Date    WBC 7.3 2020     Hemoglobin/Hematocrit:    Lab Results   Component Value Date    HGB 8.6 2020    HCT 25.6 2020     BMP:    Lab Results   Component Value Date     2020    K 4.7 2020    K 4.7 2020     2020    CO2 21 2020    BUN 44 2020    LABALBU 2.7 2020    CREATININE 1.6 2020    CALCIUM 8.3 2020    GFRAA 36 2020    GFRAA >60 2012    LABGLOM 30 2020     PT/INR:    Lab Results   Component Value Date    PROTIME 12.8 2020    INR 1.10 2020     PTT:    Lab Results   Component Value Date    APTT 38.7 2020   [APTT        Antibiotic Therapy:  None     Imaging: no new       Impression/Plan:  81yo F COVID + (now negative) admitted with UTI, urinary retention and bilateral hydro with JUANCARLOS.  Has had persistent gross hematuria since cath insertion.  POD#1 s/p cysto, clot evac, fulguration, bladder bx.      -cysto showed diffuse clot adherent to bladder mucosa; probable underlying bladder carcinoma; during clot evacuation lower abdomen distended concerning for bladder perforation and procedure aborted  -Cr @ 1.6  -hospice following     CELIA Gilbert - CNP

## 2020-08-14 NOTE — OP NOTE
[address]                                OPERATIVE REPORT    PATIENT NAME: Annalise Dallas                       :        1922  MED REC NO:   [id number]                          ROOM:       2053  ACCOUNT NO:   [de-identified]                           ADMIT DATE: 2020  PROVIDER:     Krzysztof Simmons MD    DATE OF PROCEDURE:  2020    PREOPERATIVE DIAGNOSIS:  Recurrent gross hematuria. 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 PROCEDURES:  Cystourethroscopy, clot evacuation, bladder biopsy x2 with  bladder fulguration. SURGEON:  Krzysztof Simmons MD    ANESTHESIA:  General.    COMPLICATIONS:  Probable bladder perforation. ESTIMATED BLOOD LOSS:  Approximately 50 mL. FINDINGS:  Probable diffuse bladder carcinoma with diffuse blood clot  adherent to bladder mucosa. INDICATIONS:  This patient is a 77-year-old female who was admitted  after a fall. She has had ongoing recurrent issues with gross  hematuria. She was also treated with intravenous antibiotic therapy for  probable urinary tract infection. She was evaluated with a CT scan of  abdomen and pelvis, which had demonstrated a circumferential bladder  wall thickening with a Cha catheter in place. A long discussion was  held with the patient's family regarding further management. They have  elected to proceed with a cystoscopy, clot evacuation, and bladder  fulguration. The risk, benefits, and alternatives of the procedure were  explained and informed consent was obtained prior to the procedure. DESCRIPTION OF THE OPERATION:  After informed consent was obtained, the  patient was taken to the operating room, placed on the OR table in  supine position. General anesthesia was induced. She was repositioned  in the modified dorsal lithotomy position.   Indwelling Cha catheter  was removed and the external genitalia were prepped and draped in the  usual sterile manner. A 21-[place] cystoscope with 30-degree angle lens  was passed per urethra into the patient's urinary bladder. Urethra  itself was unremarkable. Once the scope was advanced into the urinary  bladder, there was diffuse clot within the bladder wall, some protruding  in the lumen but largely the clot was adherent to the bladder mucosa,  adherent to approximately 60% to 70% of the bladder wall. I was able to  evacuate some of this blood clot out using an [employer] evacuator. Once  this was done, I reinspected the bladder. There was still a significant  amount of adherent blood clot, but I was able to better visualize the  bladder mucosa and there was concern on visualization of the bladder  mucosa that it was an infiltrating neoplastic process. The resectoscope  was used. The 26-[place] resectoscope was used to evacuate the clot out  using the [employer] evacuator, was also used to take two biopsies from the  right posterior bladder wall and this was to be sent for permanent  pathologic examination. I wanted to obtain more pathologic specimen;  however, the bladder mucosa at this point was hemorrhagic, this was  fulgurated. There were a couple other areas of bladder mucosa on the  left posterior bladder wall and just above the trigone that were also  bleeding, and these areas were fulgurated. I attempted to get more clot  out from the bladder using the [employer] Fowler evacuator. This was very difficult  and, at this time, the patient's lower abdomen was noted to become  progressively distended concerning for a bladder perforation. Neither  ureteral orifice was identified during this cystoscopic exam.   Therefore, I made no further attempt to resect this tissue or get  anymore blood clot out. I immediately stopped at this point. A  22-[place]se 3-way Cha catheter was replaced into the bladder. I did not  run any continuous bladder irrigation as irrigation port was plugged. I  attempted to irrigate the catheter which was very sluggish to irrigate. At this point, decision was made that the patient would require a CT  cystogram.  Fluoroscopy unit was not currently functional at this point. Therefore, I could not do an on-table cystogram.  Therefore, the  catheter was left in place, anesthesia reversed. The patient was taken  to the recovery room, and we will make arrangements for a CT cystogram  to confirm a bladder perforation. Discussion was held with the  patient's daughter postoperatively regarding these findings and, at this  point, they indicated they would like no further surgical management  such as a laparotomy with closure of the bladder rupture if that is what  is found on the CT scan due to the patient's advanced age and frailty.         Monica Dozier MD    D: 08/13/2020 14:52:28       T: 08/13/2020 17:01:11     [email or web address]  Job#: [id number]     Doc#: [id number]    CC:

## 2020-08-14 NOTE — DISCHARGE SUMMARY
Physician Discharge Summary     Patient ID:  Kenya Herbert  [id number]  89 y.o.  3/12/1922    Admit date: 8/3/2020    Discharge date and time: No discharge date for patient encounter. Admitting Physician: Felizardo Canavan, MD     Discharge Physician: Moi Quinn MD    Admission Diagnoses: Acute metabolic encephalopathy [G49.18]  Hematuria [R31.9]    Discharge Diagnoses:   Ongoing hematuria secondary to multiple blood clots with suspected underlying bladder cancer and possible bladder perforation  Klebsiella UTI treated  Acute kidney injury prerenal and postrenal  Non-ST elevation MI type II secondary demand ischemia  Fall mechanical  New onset mild systolic heart failure with moderate to severe aortic stenosis  Traumatic comminuted and displaced left occipital condyle and C1 lateral mass fracture    Admission Condition: poor    Discharged Condition: critical    Indication for Admission: fall    Hospital Course:   COVID-19 pneumonia  improved   ID signed off  Patient tested negative twice and out of precautions.      Acute metabolic encephalopathy:   improved and interactive  Multifactorial UTI, renal failure     JUANCARLOS multifactorial prerenal and postrenal  PVR was reported to be 900 cc  Renal ultrasound mild left hydronephrosis and mild right hydroureter. Seen by urology  Hematuria has cleared up  Creatinine is stable        UTI with Klebsiella  Completed 6 day course of IV abx      Hematuria of unclear etiology. Continue CBI per urology  Hematuria has cleared up  Discussed with urology the plan is to do cystoscopytoday since bleeding intermittent  But persist. Family understands its high risk         Elevated troponin in setting of JUANCARLOS. Troponin trending down.   Echocardiogram shows ejection fraction of 40 to 45% with moderate to severe aortic stenosis     New onset mild systolic heart failure with moderate to severe aortic stenosis  Currently seems euvolemic        Fall unsure mechanical versus other reason. Follow-up on echocardiogram.  MRA and MRI reviewed no aneurysm or stroke.     Traumatic comminuted and displaced left occipital condyle and C1 left lateral mass fracture  Neurosurgery consult reviewed and appreciated  Louin J collar to be worn at all times  Cervical collar does NOT need to be worn when eating, bathing or showering  Cervical collar pads to be cleaned with soap & water, air dried, and changed daily   looks comfortable currently on as needed Tylenol     Dysphagia  Multifactorial exacerbated by her cervical collar  Per family request they want to move go ahead with the PEG tube insertion  post PEG tube      Left hip pain:  Xray  did not show any fractures    Subsequently after a lot of discussion with urology and myself having explained risks and benefits family decided to pursue cystoscopy  During cystoscopy multiple clots were seen  Removing all the adherent clot indicated that there were lesions which were very suggestive of bladder cancer  Also perforation was noted  CT cystogram confirmed that  She was transferred to ICU  I spoke to the son Royer Singh and the daughter who are at the bedside  I explained all of the above issues  There is also spoken to urology  They understand the terminal nature of this issue  I offered to option 1 would be continuing medical care with antibiotics with high risk of peritonitis  Alternatively given advanced age multiple comorbidities I offered comfort care.   I explained that at this point we would stop antibiotics and lab checks fully focus on comfort with PRN morphine and Ativan understanding the inevitability of the situation but will keep her as comfortable as possible  Both son and daughter  Want her to be comfortable  They wanted to be extubated and have opted for hospice and comfort care  Terminal extubation orders placed  Comfort care orders placed  Hospice orders placed  She is now being transferred with hospice currently remains comfortable        Discharge Exam:     patient drowsy but looks comfortable  Chest examination decreased air entry  Abdomen soft non tender    Disposition: SNF    In process/preliminary results:  Outstanding Order Results     Date and Time Order Name Status Description    8/13/2020 1329 Surgical Pathology In process           Patient Instructions:   Current Discharge Medication List      START taking these medications    Details   scopolamine (TRANSDERM-SCOP) transdermal patch Place 1 patch onto the skin every 72 hours  Qty: 30 patch, Refills: 0      morphine sulfate 20 MG/ML concentrated oral solution Take 0.5 mLs by mouth every 2 hours as needed for Pain for up to 3 days. Qty: 1 Bottle, Refills: 0    Comments: Reduce doses taken as pain becomes manageable  Associated Diagnoses: Gross hematuria      LORazepam (ATIVAN) 2 MG/ML concentrated solution Take 0.5 mLs by mouth every 8 hours as needed (agitation) for up to 14 days.   Qty: 1 Bottle, Refills: 0    Associated Diagnoses: Gross hematuria         STOP taking these medications       FLUZONE QUADRIVALENT 0.5 ML injection Comments:   Reason for Stopping:         triamcinolone (KENALOG) 0.1 % cream Comments:   Reason for Stopping:         potassium chloride (KLOR-CON M) 20 MEQ extended release tablet Comments:   Reason for Stopping:         latanoprost (XALATAN) 0.005 % ophthalmic solution Comments:   Reason for Stopping:         lisinopril (PRINIVIL;ZESTRIL) 2.5 MG tablet Comments:   Reason for Stopping:         metoprolol tartrate (LOPRESSOR) 25 MG tablet Comments:   Reason for Stopping:         calcium elemental (OYSCO 500) 500 MG TABS tablet Comments:   Reason for Stopping:         solifenacin (VESICARE) 5 MG tablet Comments:   Reason for Stopping:         vitamin D (CHOLECALCIFEROL) 1000 UNIT TABS tablet Comments:   Reason for Stopping:         hydrALAZINE (APRESOLINE) 10 MG tablet Comments:   Reason for Stopping:         polyethylene glycol (GLYCOLAX) powder Comments:   Reason for Stopping:         senna-docusate (PERICOLACE) 8.6-50 MG per tablet Comments:   Reason for Stopping:         loratadine (CLARITIN) 10 MG tablet Comments:   Reason for Stopping:         aspirin 325 MG EC tablet Comments:   Reason for Stopping:         dorzolamide-timolol (COSOPT) 22.3-6.8 MG/ML ophthalmic solution Comments:   Reason for Stopping:         Flax OIL Comments:   Reason for Stopping:         acetaminophen (AMINOFEN) 325 MG tablet Comments:   Reason for Stopping:         ranitidine (ZANTAC) 150 MG tablet Comments:   Reason for Stopping:         levothyroxine (SYNTHROID) 125 MCG tablet Comments:   Reason for Stopping:         cyanocobalamin 1000 MCG/ML injection Comments:   Reason for Stopping:         Compression Stockings MISC Comments:   Reason for Stopping:         SYRINGE-NEEDLE, DISP, 3 ML (B-D 3CC LUER-ENEDINA SYR 25GX5/8\") 25G X 5/8\" 3 ML MISC Comments:   Reason for Stopping:         Needles & Syringes MISC Comments:   Reason for Stopping:             Activity: activity as tolerated  Diet: regular diet  Wound Care: none needed    Time spent 45 mins    Signed:  Rose Almeida MD  8/14/2020  11:45 AM

## 2020-08-14 NOTE — DISCHARGE INSTR - COC
Continuity of Care Form    Patient Name: Kathy Martínez   :  3/12/1922  MRN:  [id number]    Admit date:  8/3/2020  Discharge date:  ***    Code Status Order: [facility]   Advance Directives:   [facility] Documentation     Date/Time Healthcare Directive Type of Healthcare Directive Copy in [address] Agent's Name Healthcare Agent's Phone Number    20 1133  Yes, patient has an advance directive for healthcare treatment limited code --  Yes, copy in chart -- -- --    20 1223  Yes, patient has an advance directive for healthcare treatment --  Yes, copy in chart -- -- --    20 1456  Unknown, patient unable to respond due to medical condition --  -- -- -- --          Admitting Physician:  Yumiko Tran MD  PCP: Eliseo Mcfarlane MD    Discharging Nurse: [facility] Unit/Room#: 2896/9810-63  Discharging Unit Phone Number: ***    Emergency Contact:   Extended Emergency Contact Information  Primary Emergency Contact: Alina Davis  Address: [address] [place] [address] Phone: [phone]  Mobile Phone: [phone]  Relation: Child  Secondary Emergency Contact: Shanthi Quigley   [address] Phone: [phone]  Relation: Other    Past Surgical History:  Past Surgical History:   Procedure Laterality Date    COLECTOMY      partial    COLONOSCOPY      OK - no further eval    CYSTOSCOPY N/A 2020    CYSTOSCOPY, CLOT EVACUATION,  FULGERATION, BLADDER BIOPSY performed by Carlos Enrique Peters MD at [facility] N/A 2020    EGD ESOPHAGOGASTRODUODENOSCOPY PEG TUBE INSERTION performed by Chloe To MD at [facility] ENDOSCOPY       Immunization History:   Immunization History   Administered Date(s) Administered    Influenza Whole 10/09/2008    Influenza, High Dose (Fluzone 65 yrs and older) 10/04/2011, 10/08/2014       Active Problems:  Patient Active Problem List   Diagnosis Code    Esophageal reflux K21.9    Hypothyroidism E03.9    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    Cataract H26.9    Allergic rhinitis J30.9    Pernicious anemia D51.0    Menopausal symptoms N95.1    Onychomycosis B35.1    Elevated blood pressure reading R03.0    Leukopenia D72.819    Edema R60.9    Shoulder pain, bilateral M25.511, M25.512    Pain in both feet M79.671, M79.672    Heart murmur R01.1    Impacted cerumen of both ears H61.23    Skin lesion L98.9    Closed bimalleolar fracture of right ankle S82.841A    Bimalleolar ankle fracture, left, closed, initial encounter H41.620S    Acute metabolic encephalopathy P22.74    COVID-19 U07.1    Hematuria R31.9       Isolation/Infection:   Isolation          No Isolation        Patient Infection Status     Infection Onset Added Last Indicated Last Indicated By Review Planned Expiration Resolved Resolved By    None active    Resolved    COVID-19 08/03/20 08/03/20 08/03/20 COVID-19   08/10/20 Fide Mohan RN    COVID-19 Rule Out 08/03/20 08/03/20 08/03/20 COVID-19 (Ordered)   08/03/20 Rule-Out Test Resulted          Nurse Assessment:  Last Vital Signs: BP (!) 81/46   Pulse 74   Temp 97 °F (36.1 °C) (Temporal)   Resp 14   Ht 5' 2\" (1.575 m)   Wt 157 lb 6.5 oz (71.4 kg)   SpO2 (!) 86%   BMI 28.79 kg/m²     Last documented pain score (0-10 scale): Pain Level: (DNR CC )  Last Weight:   Wt Readings from Last 1 Encounters:   08/05/20 157 lb 6.5 oz (71.4 kg)     Mental Status:  {IP PT MENTAL STATUS:20030}    IV Access:  {[facility] IV ACCESS:986339283}    Nursing Mobility/ADLs:  Walking   {CHP DME EPYU:323778806}  Transfer  {CHP DME HFJU:690888437}  Bathing  {CHP DME PEVD:503449869}  Dressing  {CHP DME KHSZ:963034090}  Toileting  {CHP DME ISTD:901686626}  Feeding  {SARAH LOO KHPZ:674736984}  Med Admin  {[facility] ZJIZ:795027051}  Med Delivery   {[facility] MED Delivery:677446315}    Wound Care Documentation and Therapy:        Elimination:  Continence:   · Bowel: {YES / FD:81254}  · Bladder: {YES / AQ:12445}  Urinary Catheter: {Urinary Catheter:969802806}   Colostomy/Ileostomy/Ileal Conduit: {YES / GX:88350}       Date of Last BM: ***    Intake/Output Summary (Last 24 hours) at 2020 1026  Last data filed at 2020 0800  Gross per 24 hour   Intake --   Output 1000 ml   Net -1000 ml     No intake/output data recorded.     Safety Concerns:     [address] Safety Concerns:981517277}    Impairments/Disabilities:      [address] Impairments/Disabilities:868926958}    Nutrition Therapy:  Current Nutrition Therapy:   [address] Diet List:012875362}    Routes of Feeding: {CHP DME Other Feedings:868899023}  Liquids: {Slp liquid thickness:54808}  Daily Fluid Restriction: {CHP DME Yes amt example:824488884}  Last Modified Barium Swallow with Video (Video Swallowing Test): {Done Not Done ZCPS:264843206}    Treatments at the Time of Hospital Discharge:   Respiratory Treatments: ***  Oxygen Therapy:  {Therapy; copd oxygen:66225}  Ventilator:    { CC Vent CNJR:682373782}    Rehab Therapies: {THERAPEUTIC INTERVENTION:1340761122}  Weight Bearing Status/Restrictions: [employer]  Weight Bearin}  Other Medical Equipment (for information only, NOT a DME order):  {EQUIPMENT:594083135}  Other Treatments: ***    Patient's personal belongings (please select all that are sent with patient):  {CHP DME Belongings:505869184}    RN SIGNATURE:  {Esignature:864906200}    CASE MANAGEMENT/SOCIAL WORK SECTION    Inpatient Status Date: 8/3/2020    Readmission Risk Assessment Score:  Readmission Risk              Risk of Unplanned Readmission:        20           Discharging to [address]  [phone]    / signature: {Esignature:659351565}    PHYSICIAN SECTION    Prognosis: Poor    Condition at Discharge: Terminal    Rehab Potential (if transferring to Rehab): Poor    Recommended Labs or Other Treatments After Discharge: none    Physician Certification: I certify the above information and transfer of Jose Bundy  is necessary for the continuing treatment of the diagnosis listed and that she requires Bakari Kishore for greater 30 days.      Update Admission H&P: No change in H&P    PHYSICIAN SIGNATURE:  Electronically signed by Portillo Conde MD on 8/14/20 at 11:45 AM EDT

## 2020-08-15 NOTE — PROGRESS NOTES
[facility] ENDOSCOPY       History reviewed. No pertinent family history. reports that she has never smoked. She has never used smokeless tobacco. She reports that she does not drink alcohol or use drugs. Allergies:  Bee venom    Current Medications:    No current facility-administered medications for this encounter. Physical exam:     Vitals:  BP (!) 81/46   Pulse 74   Temp 97 °F (36.1 °C) (Temporal)   Resp 14   Ht 5' 2\" (1.575 m)   Wt 157 lb 6.5 oz (71.4 kg)   SpO2 (!) 86%   BMI 28.79 kg/m²   Constitutional:  resting, NAD  HEENT: DHT in place  Cardiovascular:  S1, S2 reg  Ext: no lower extremity edema  CNS:  no agitation   : Cha in place    Limited exam to preserve PPE    Data:   Labs:  CBC:   Recent Labs     08/13/20  0510   WBC 7.3   HGB 8.6*        BMP:    Recent Labs     08/13/20  0510      K 4.7  4.7      CO2 21   BUN 44*   CREATININE 1.6*   GLUCOSE 106*     Ca/Mg/Phos:   Recent Labs     08/13/20  0510   CALCIUM 8.3   MG 2.20     Hepatic:   Recent Labs     08/13/20  0510   AST 20   ALT 9*   BILITOT 0.4   ALKPHOS 69     Troponin:   No results for input(s): TROPONINI in the last 72 hours. BNP: No results for input(s): BNP in the last 72 hours. Lipids: No results for input(s): CHOL, TRIG, HDL, LDLCALC, LABVLDL in the last 72 hours. ABGs: No results for input(s): PHART, PO2ART, HUS2PLN in the last 72 hours. INR:   No results for input(s): INR in the last 72 hours. UA:  No results for input(s): Daja Henry, GLUCOSEU, BILIRUBINUR, KETUA, SPECGRAV, BLOODU, PHUR, PROTEINU, UROBILINOGEN, NITRU, LEUKOCYTESUR, Truett Tra in the last 72 hours. Urine Microscopic:   No results for input(s): LABCAST, BACTERIA, COMU, HYALCAST, WBCUA, RBCUA, EPIU in the last 72 hours. Urine Culture:   No results for input(s): LABURIN in the last 72 hours. Urine Chemistry:   No results for input(s): Julia Richard, PROTEINUR, NAUR in the last 72 hours.       IMAGING:  CT CYSTOGRAM W CONTRAST   Final Result   Impression: Findings confirmatory of bladder rupture. A defect is noted anteriorly within the urinary bladder. Rupture is considered extraperitoneal/retroperitoneal, as fluid and gas extends into the potential space anteriorly between the peritoneum and    anterior abdominal wall. Findings were discussed with Dr. Adela Molina at 1545 hours. Moderate volume ascites is noted. This is new from the prior study. The ascitic fluid is low density and does not mix with the extravasated contrast from the urinary bladder. Gas noted within the abdomen is favored to be within the preperitoneal space. Intraperitoneal gas is felt less likely, however this may also be a consideration status post recent PEG placement. Serial exam and repeat imaging may be beneficial.       XR HIP 2-3 VW W PELVIS LEFT   Final Result      No evidence of acute osseous abnormality. If the patient is nonweightbearing or there is further clinical suspicion for hip fracture, CT would be more sensitive. FL MODIFIED BARIUM SWALLOW W VIDEO   Final Result      Deep penetration/aspiration observed with essentially all substances examined as described above. Please see speech therapy report for further comments and recommendations. XR ABDOMEN (KUB) (SINGLE AP VIEW)   Final Result      NG tube advanced from the GE junction with tip overlying the expected position of the distal stomach. XR ABDOMEN (KUB) (SINGLE AP VIEW)   Final Result   Impression: Nasogastric tube is coiled within the distal esophagus. MRA NECK WO CONTRAST   Final Result   1. No evidence of acute infarct or other acute intracranial abnormality. 2. Moderate global volume loss with moderate chronic small vessel ischemic disease within the periventricular white matter. MRA NECK:      FINDINGS: The vertebral arteries are patent bilaterally and equal in size. No evidence of vertebral artery occlusion.  Both vertebral arteries contribute to the basilar artery. The common carotid arteries are patent bilaterally. The internal and external carotid arteries are patent bilaterally. There is no high-grade stenosis. There is no evidence of vessel occlusion. IMPRESSION:   1. Patent carotid and vertebral arteries. No evidence of acute dissection or occlusion. MRI BRAIN WO CONTRAST   Final Result   1. No evidence of acute infarct or other acute intracranial abnormality. 2. Moderate global volume loss with moderate chronic small vessel ischemic disease within the periventricular white matter. MRA NECK:      FINDINGS: The vertebral arteries are patent bilaterally and equal in size. No evidence of vertebral artery occlusion. Both vertebral arteries contribute to the basilar artery. The common carotid arteries are patent bilaterally. The internal and external carotid arteries are patent bilaterally. There is no high-grade stenosis. There is no evidence of vessel occlusion. IMPRESSION:   1. Patent carotid and vertebral arteries. No evidence of acute dissection or occlusion. CT ABDOMEN PELVIS WO CONTRAST Additional Contrast? None   Final Result      1. Mild bilateral hydronephrosis with ureters dilated to the level of the urinary bladder of uncertain etiology. 2. Collapsed urinary bladder with Cha catheter in place. There is suggestion of diffuse bladder wall thickening. Further evaluation with cystoscopy may be indicated. 3. Moderate age-indeterminate compression fracture of the L1 vertebral body. US RENAL COMPLETE   Final Result      Mild left hydronephrosis and mild right hydroureter. No ultrasound evidence of a renal calculus. If the patient is symptomatic for renal colic, need for further evaluation with CT abdomen can be determined clinically. Assessment/Plan   1. JUANCARLOS - secondary to obstruction/urinary retention/hypovolemia   -  Cr stable     2.  Anion gap metabolic acidosis  - resolved    3. Hypophosphatemia  - supplemented    4. Acute metabolic encephalopathy  - likely secondary to infection       5. Occipital condyle & C1 fracture  - follow neurosurgery recs      6. Bilateral hydronephrosis  -  likely secondary to urinary retention, status post Cha    7. Gross hematuria  - urology following,  - bladder irrigation  - cysto in future     8.  COVID 19 infection  - per hospitalist      Thank you for allowing us to participate in care of [address] [place] [address] free to contact me   [facility] [address]  Office : [phone]  Fax :[phone]

## 2020-08-25 NOTE — ED PROVIDER NOTES
Patient seen and examined, discussed with MLP agree with plan. Foound down at [facility], unbknown mechanism complaining of hip pain. Has left knee, back, and hip pain. Increasingly confused today. ON exam, is awake and alert oriented to self and place. Follows commands appropriately. Given concerning cervical spine CT readings for C1 fx, neurosurgery consulted and patient will be transferred to [facility].       Gabrielle Dallas MD  08/25/20 9164

## (undated) DEVICE — CABLE ENDOSCP L10FT ACT DISP

## (undated) DEVICE — Device

## (undated) DEVICE — CATHETER URETH 24FR BLLN 30CC STD LTX 3 W TWO OPP DRNGE EYE

## (undated) DEVICE — COVERALL SURG UNIV POLYPR SLVLSS CUF STYL FASTENING TIE W/O

## (undated) DEVICE — PREP TRAY 10X5X2: Brand: MEDLINE INDUSTRIES, INC.

## (undated) DEVICE — GAUZE,SPONGE,4"X4",16PLY,XRAY,STRL,LF: Brand: MEDLINE

## (undated) DEVICE — Z INACTIVE NO SUPPLIER SOLUTIONIRRIGATION H2O [79730505] [ICU MEDICAL INC]

## (undated) DEVICE — IV START KIT: Brand: MEDLINE INDUSTRIES, INC.

## (undated) DEVICE — BAG DRNGE 2000ML UROLOGY ANTI REFLX CHMBR SAMP PRT

## (undated) DEVICE — TUBING IRRIG L77IN DIA0.241IN L BOR FOR CYSTO W/ NVENT

## (undated) DEVICE — SYRINGE MED 10ML POLYPR LUERSLIP TIP FLAT TOP W/O SFTY DISP

## (undated) DEVICE — PLATE ES AD W 9FT CRD 2